# Patient Record
Sex: MALE | Race: WHITE | NOT HISPANIC OR LATINO | Employment: FULL TIME | URBAN - METROPOLITAN AREA
[De-identification: names, ages, dates, MRNs, and addresses within clinical notes are randomized per-mention and may not be internally consistent; named-entity substitution may affect disease eponyms.]

---

## 2017-05-04 ENCOUNTER — OFFICE VISIT (OUTPATIENT)
Dept: PEDIATRICS | Facility: CLINIC | Age: 47
End: 2017-05-04
Payer: COMMERCIAL

## 2017-05-04 VITALS
WEIGHT: 247 LBS | HEIGHT: 76 IN | OXYGEN SATURATION: 95 % | TEMPERATURE: 98 F | DIASTOLIC BLOOD PRESSURE: 80 MMHG | HEART RATE: 56 BPM | BODY MASS INDEX: 30.08 KG/M2 | SYSTOLIC BLOOD PRESSURE: 128 MMHG

## 2017-05-04 DIAGNOSIS — Z00.00 ENCOUNTER FOR ROUTINE ADULT HEALTH EXAMINATION WITHOUT ABNORMAL FINDINGS: ICD-10-CM

## 2017-05-04 DIAGNOSIS — M79.672 CHRONIC PAIN OF LEFT HEEL: Primary | ICD-10-CM

## 2017-05-04 DIAGNOSIS — G89.29 CHRONIC PAIN OF LEFT HEEL: Primary | ICD-10-CM

## 2017-05-04 PROCEDURE — 99386 PREV VISIT NEW AGE 40-64: CPT | Performed by: INTERNAL MEDICINE

## 2017-05-04 PROCEDURE — 99213 OFFICE O/P EST LOW 20 MIN: CPT | Mod: 25 | Performed by: INTERNAL MEDICINE

## 2017-05-04 RX ORDER — FLUTICASONE PROPIONATE 50 MCG
1 SPRAY, SUSPENSION (ML) NASAL DAILY
COMMUNITY

## 2017-05-04 NOTE — MR AVS SNAPSHOT
After Visit Summary   5/4/2017    Riley Lagos    MRN: [id number]           Patient Information     Date Of Birth          1970        Visit Information        Provider Department      5/4/2017 4:30 PM Greyson Mendez MD [facility]        Today's Diagnoses     Chronic pain of left heel    -  1    Encounter for routine adult health examination without abnormal findings          Care Instructions    1) Ordered cholesterol, liver/kidney functions, and prostate cancer screening for you, this can be done as a lab only appointment that you scheduled.    2) They should call you to set up with sports medicine in [place]    Greyson Mendez MD    Preventive Health Recommendations  Male Ages 40 to 49    Yearly exam:             See your health care provider every year in order to  o   Review health changes.   o   Discuss preventive care.    o   Review your medicines if your doctor has prescribed any.    You should be tested each year for STDs (sexually transmitted diseases) if you re at risk.     Have a cholesterol test every 5 years.     Have a colonoscopy (test for colon cancer) if someone in your family has had colon cancer or polyps before age 50.     After age 45, have a diabetes test (fasting glucose). If you are at risk for diabetes, you should have this test every 3 years.      Talk with your health care provider about whether or not a prostate cancer screening test (PSA) is right for you.    Shots: Get a flu shot each year. Get a tetanus shot every 10 years.     Nutrition:    Eat at least 5 servings of fruits and vegetables daily.     Eat whole-grain bread, whole-wheat pasta and brown rice instead of white grains and rice.     Talk to your provider about Calcium and Vitamin D.     Lifestyle    Exercise for at least 150 minutes a week (30 minutes a day, 5 days a week). This will help you control your weight and prevent disease.     Limit alcohol to one drink per day.     No  "smoking.     Wear sunscreen to prevent skin cancer.     See your dentist every six months for an exam and cleaning.        Some additional information on kidney stones  Dietary modification -- From the viewpoint of diet, increasing the intake of fluid, dietary calcium, potassium and phytate may be beneficial. In addition, decreasing the intake of oxalate, animal protein, sucrose, fructose, sodium, supplemental vitamin C, and supplemental calcium (as opposed to dietary calcium) may reduce the risk of stones [1] (see \"Risk factors for calcium stones in adults\"). The role of vitamin D intake in stone formation remains unclear.  The importance of a possible \"stone clinic\" effect following dietary recommendations should not be underestimated. One study found that 58 percent of 108 patients evaluated for kidney stones had, over a five-year period, no evidence of active stone disease after a visit to the stone clinic in which minimal advice was given regarding diet and fluid intake [2]. Those patients who did not form new stones showed an increase in urine volume at follow-up versus no change in those who continued to form stones.  Increase fluid intake -- Increasing fluid intake, spread throughout the day (although it is not essential that the patient wake up several times per night to urinate), will increase the urine flow rate and lower the urine solute concentration, both of which protect against stone formation [3]. In one prospective trial, 199 patients with a first calcium oxalate stone were randomly assigned to no therapy or recommendation of a high fluid intake to produce at least 2 liters of urine per day [4]. At five years, the incidence of new stone formation was significantly lower in the treated patients than in those in the control group (12 versus 27 percent).   Similar findings were noted in a prospective study [5]. Stone formers who remained free of stones were noted to have a greater increase in urine " "volume than those who had recurrent disease (320 mL/day versus no change). This study emphasizes that even small increases in fluid intake can reduce the risk of new stone formation.  Dietary modification -- From the viewpoint of diet, increasing the intake of fluid, dietary calcium, potassium and phytate may be beneficial. In addition, decreasing the intake of oxalate, animal protein, sucrose, fructose, sodium, supplemental vitamin C, and supplemental calcium (as opposed to dietary calcium) may reduce the risk of stones [1] (see \"Risk factors for calcium stones in adults\"). The role of vitamin D intake in stone formation remains unclear.  The importance of a possible \"stone clinic\" effect following dietary recommendations should not be underestimated. One study found that 58 percent of 108 patients evaluated for kidney stones had, over a five-year period, no evidence of active stone disease after a visit to the stone clinic in which minimal advice was given regarding diet and fluid intake [2]. Those patients who did not form new stones showed an increase in urine volume at follow-up versus no change in those who continued to form stones.  Increase fluid intake -- Increasing fluid intake, spread throughout the day (although it is not essential that the patient wake up several times per night to urinate), will increase the urine flow rate and lower the urine solute concentration, both of which protect against stone formation [3]. In one prospective trial, 199 patients with a first calcium oxalate stone were randomly assigned to no therapy or recommendation of a high fluid intake to produce at least 2 liters of urine per day [4]. At five years, the incidence of new stone formation was significantly lower in the treated patients than in those in the control group (12 versus 27 percent).   Similar findings were noted in a prospective study [5]. Stone formers who remained free of stones were noted to have a greater " increase in urine volume than those who had recurrent disease (320 mL/day versus no change). This study emphasizes that even small increases in fluid intake can reduce the risk of new stone formation.          Follow-ups after your visit        Additional Services     ORTHO  REFERRAL       [facility] is referring you to the Orthopedic  Services at [facility].       The  Representative will assist you in the coordination of your Orthopedic and Musculoskeletal Care as prescribed by your physician.    The  Representative will call you within 1 business day to help schedule your appointment, or you may contact the  Representative at:    All areas ~ [phone]     Type of Referral : Non Surgical Dr. Bingham or per patient in [place]      Timeframe requested: 3 - 5 days    Coverage of these services is subject to the terms and limitations of your health insurance plan.  Please call member services at your health plan with any benefit or coverage questions.      If X-rays, CT or MRI's have been performed, please contact the facility where they were done to arrange for , prior to your scheduled appointment.  Please bring this referral request to your appointment and present it to your specialist.                  Your next 10 appointments already scheduled     May 09, 2017  8:00 AM CDT   LAB with EA LAB   [facility] ([facility])    [address]  [address] [place] [id number] [phone]           Patient must bring picture ID.  Patient should be prepared to give a urine specimen  Please do not eat 10-12 hours before your appointment if you are coming in fasting for labs on lipids, cholesterol, or glucose (sugar).  Pregnant women should follow their Care Team instructions. Water with medications is okay. Do not drink coffee or other fluids.   If you have concerns about taking  your  "medications, please ask at office or if scheduling via ChipIn, send a message by clicking on Secure Messaging, Message Your Care Team.              Future tests that were ordered for you today     Open Future Orders        Priority Expected Expires Ordered    Comprehensive metabolic panel Routine  5/4/2018 5/4/2017    Lipid panel reflex to direct LDL Routine  5/4/2018 5/4/2017    PSA, screen Routine  5/4/2018 5/4/2017            Who to contact     If you have questions or need follow up information about today's clinic visit or your schedule please contact [facility] directly at [phone].  Normal or non-critical lab and imaging results will be communicated to you by NexJ Systemshart, letter or phone within 4 business days after the clinic has received the results. If you do not hear from us within 7 days, please contact the clinic through iPerceptionst or phone. If you have a critical or abnormal lab result, we will notify you by phone as soon as possible.  Submit refill requests through ChipIn or call your pharmacy and they will forward the refill request to us. Please allow 3 business days for your refill to be completed.          Additional Information About Your Visit        NexJ Systemshar8bit Information     ChipIn gives you secure access to your electronic health record. If you see a primary care provider, you can also send messages to your care team and make appointments. If you have questions, please call your primary care clinic.  If you do not have a primary care provider, please call [phone] and they will assist you.        Care EveryWhere ID     This is your Care EveryWhere ID. This could be used by other organizations to access your [facility] medical records  [id number]        Your Vitals Were     Pulse Temperature Height Pulse Oximetry BMI (Body Mass Index)       56 98  F (36.7  C) (Oral) 6' 4\" (1.93 m) 95% 30.07 kg/m2        Blood Pressure from Last 3 Encounters:   05/04/17 128/80   06/29/15 139/83 "   01/19/12 140/84    Weight from Last 3 Encounters:   05/04/17 247 lb (112 kg)   06/29/15 230 lb (104.3 kg)   01/19/12 249 lb (112.9 kg)              We Performed the Following     ORTHO  REFERRAL        Primary Care Provider Office Phone # Fax #    Greyson Mendez [phone]       [facility] [address] [place] [address]        Thank you!     Thank you for choosing [facility]  for your care. Our goal is always to provide you with excellent care. Hearing back from our patients is one way we can continue to improve our services. Please take a few minutes to complete the written survey that you may receive in the mail after your visit with us. Thank you!             Your Updated Medication List - Protect others around you: Learn how to safely use, store and throw away your medicines at www.disposemymeds.org.          This list is accurate as of: 5/4/17  5:16 PM.  Always use your most recent med list.                   Brand Name Dispense Instructions for use    CLARITIN 10 MG capsule   Generic drug:  loratadine      Take 10 mg by mouth daily.       FLONASE NA

## 2017-05-04 NOTE — NURSING NOTE
"Chief Complaint   Patient presents with     Physical     Establish Care       Initial /80 (BP Location: Right arm, Cuff Size: Adult Large)  Pulse 56  Temp 98  F (36.7  C) (Oral)  Ht 6' 4\" (1.93 m)  Wt 247 lb (112 kg)  SpO2 95%  BMI 30.07 kg/m2 Estimated body mass index is 30.07 kg/(m^2) as calculated from the following:    Height as of this encounter: 6' 4\" (1.93 m).    Weight as of this encounter: 247 lb (112 kg).  Medication Reconciliation: complete   Mirtha Sharma MA    "

## 2017-05-04 NOTE — PROGRESS NOTES
SUBJECTIVE:     CC: Riley Lagos is an 47 year old male who presents for preventative health visit.     Physical   Annual:     Getting at least 3 servings of Calcium per day::  Yes    Bi-annual eye exam::  NO    Dental care twice a year::  Yes    Sleep apnea or symptoms of sleep apnea::  Excessive snoring    Diet::  Regular (no restrictions)    Frequency of exercise::  4-5 days/week    Duration of exercise::  30-45 minutes    Taking medications regularly::  Yes    Medication side effects::  Not applicable    Additional concerns today::  YES    Left ankle: has been causing some issues- seen [facility] orth[facility] in the past has noted spur in the area, pain in the achilles area, did PT- did not help area. Has been exercising every morning, doing biking, worse with dress shoes. Discussed platelet rich injection.    Sleeping- typically will get up once at night for urination, FIT bit sleep showing fairly still- snoring at times. Did sleep study in the past       Today's PHQ-2 Score:   PHQ-2 ( 1999 Pfizer) 5/1/2017   Little interest or pleasure in doing things Not at all   Feeling down, depressed or hopeless Not at all   PHQ-2 Score 0       Abuse: Current or Past(Physical, Sexual or Emotional)- No  Do you feel safe in your environment - Yes    Social History   Substance Use Topics     Smoking status: Never Smoker     Smokeless tobacco: Not on file     Alcohol use 2.0 oz/week     4 Standard drinks or equivalent per week     The patient does not drink >3 drinks per day nor >7 drinks per week.    Last PSA: No results found for: PSA    Recent Labs   Lab Test  01/10/12   0901   CHOL  180   HDL  61   LDL  103   TRIG  78   CHOLHDLRATIO  3.0       Reviewed orders with patient. Reviewed health maintenance and updated orders accordingly - Yes    Reviewed and updated as needed this visit by clinical staff  Tobacco  Allergies  Meds  Med Hx  Surg Hx  Fam Hx  Soc Hx        Reviewed and updated as needed this visit by  "Provider            ROS:  C: NEGATIVE for fever, chills, change in weight  I: NEGATIVE for worrisome rashes, moles or lesions  E: NEGATIVE for vision changes or irritation  ENT: NEGATIVE for ear, mouth and throat problems  R: NEGATIVE for significant cough or SOB  CV: NEGATIVE for chest pain, palpitations or peripheral edema  GI: NEGATIVE for nausea, abdominal pain, heartburn, or change in bowel habits   male: negative for dysuria, hematuria, decreased urinary stream, erectile dysfunction, urethral discharge  M: NEGATIVE for significant arthralgias or myalgia  N: NEGATIVE for weakness, dizziness or paresthesias  P: NEGATIVE for changes in mood or affect    Problem list, Medication list, Allergies, and Medical/Social/Surgical histories reviewed in [facility] and updated as appropriate.  OBJECTIVE:     /80 (BP Location: Right arm, Cuff Size: Adult Large)  Pulse 56  Temp 98  F (36.7  C) (Oral)  Ht 6' 4\" (1.93 m)  Wt 247 lb (112 kg)  SpO2 95%  BMI 30.07 kg/m2  EXAM:  GENERAL: healthy, alert and no distress  EYES: Eyes grossly normal to inspection, PERRL and conjunctivae and sclerae normal  HENT: ear canals and TM's normal, nose and mouth without ulcers or lesions  NECK: no adenopathy, no asymmetry, masses, or scars and thyroid normal to palpation  RESP: lungs clear to auscultation - no rales, rhonchi or wheezes  CV: regular rate and rhythm, normal S1 S2, no S3 or S4, no murmur, click or rub, no peripheral edema and peripheral pulses strong  ABDOMEN: soft, nontender, no hepatosplenomegaly, no masses and bowel sounds normal  MS: tenderness along left achilles insertion  SKIN: no suspicious lesions or rashes  NEURO: Normal strength and tone, mentation intact and speech normal  BACK: no CVA tenderness, no paralumbar tenderness  PSYCH: mentation appears normal, affect normal/bright  extremities normal- no gross deformities noted  ASSESSMENT/PLAN:     1. Chronic pain of left heel  Discussed evaluation by sports " "medicine  - ORTHO  REFERRAL    2. Encounter for routine adult health examination without abnormal findings  Will do routine health screening, family history of prostate cancer, so will start screening  - Lipid panel reflex to direct LDL; Future  - PSA, screen; Future  - Comprehensive metabolic panel; Future    COUNSELING:   Reviewed preventive health counseling, as reflected in patient instructions         reports that he has never smoked. He does not have any smokeless tobacco history on file.    Estimated body mass index is 28 kg/(m^2) as calculated from the following:    Height as of 6/29/15: 6' 4\" (1.93 m).    Weight as of 6/29/15: 230 lb (104.3 kg).   Weight management plan: Discussed healthy diet and exercise guidelines and patient will follow up in 12 months in clinic to re-evaluate.    Counseling Resources:  ATP IV Guidelines  Pooled Cohorts Equation Calculator  FRAX Risk Assessment  ICSI Preventive Guidelines  Dietary Guidelines for Americans, 2010  USDA's MyPlate  ASA Prophylaxis  Lung CA Screening    Greyson Mendez MD, MD  [facility]  "

## 2017-05-04 NOTE — PATIENT INSTRUCTIONS
1) Ordered cholesterol, liver/kidney functions, and prostate cancer screening for you, this can be done as a lab only appointment that you scheduled.    2) They should call you to set up with sports medicine in [place]    Greyson Mendez MD    Preventive Health Recommendations  Male Ages 40 to 49    Yearly exam:             See your health care provider every year in order to  o   Review health changes.   o   Discuss preventive care.    o   Review your medicines if your doctor has prescribed any.    You should be tested each year for STDs (sexually transmitted diseases) if you re at risk.     Have a cholesterol test every 5 years.     Have a colonoscopy (test for colon cancer) if someone in your family has had colon cancer or polyps before age 50.     After age 45, have a diabetes test (fasting glucose). If you are at risk for diabetes, you should have this test every 3 years.      Talk with your health care provider about whether or not a prostate cancer screening test (PSA) is right for you.    Shots: Get a flu shot each year. Get a tetanus shot every 10 years.     Nutrition:    Eat at least 5 servings of fruits and vegetables daily.     Eat whole-grain bread, whole-wheat pasta and brown rice instead of white grains and rice.     Talk to your provider about Calcium and Vitamin D.     Lifestyle    Exercise for at least 150 minutes a week (30 minutes a day, 5 days a week). This will help you control your weight and prevent disease.     Limit alcohol to one drink per day.     No smoking.     Wear sunscreen to prevent skin cancer.     See your dentist every six months for an exam and cleaning.        Some additional information on kidney stones  Dietary modification   From the viewpoint of diet, increasing the intake of fluid, dietary calcium, potassium and phytate may be beneficial. In addition, decreasing the intake of oxalate, animal protein, sucrose, fructose, sodium, supplemental vitamin C, and supplemental calcium  "(as opposed to dietary calcium) may reduce the risk of stones [1] (see \"Risk factors for calcium stones in adults\"). The role of vitamin D intake in stone formation remains unclear.  The importance of a possible \"stone clinic\" effect following dietary recommendations should not be underestimated. One study found that 58 percent of 108 patients evaluated for kidney stones had, over a five-year period, no evidence of active stone disease after a visit to the stone clinic in which minimal advice was given regarding diet and fluid intake [2]. Those patients who did not form new stones showed an increase in urine volume at follow-up versus no change in those who continued to form stones.  Increase fluid intake   Increasing fluid intake, spread throughout the day (although it is not essential that the patient wake up several times per night to urinate), will increase the urine flow rate and lower the urine solute concentration, both of which protect against stone formation [3]. In one prospective trial, 199 patients with a first calcium oxalate stone were randomly assigned to no therapy or recommendation of a high fluid intake to produce at least 2 liters of urine per day [4]. At five years, the incidence of new stone formation was significantly lower in the treated patients than in those in the control group (12 versus 27 percent).   Similar findings were noted in a prospective study [5]. Stone formers who remained free of stones were noted to have a greater increase in urine volume than those who had recurrent disease (320 mL/day versus no change). This study emphasizes that even small increases in fluid intake can reduce the risk of new stone formation.  Dietary modification   From the viewpoint of diet, increasing the intake of fluid, dietary calcium, potassium and phytate may be beneficial. In addition, decreasing the intake of oxalate, animal protein, sucrose, fructose, sodium, supplemental vitamin C, and " "supplemental calcium (as opposed to dietary calcium) may reduce the risk of stones [1] (see \"Risk factors for calcium stones in adults\"). The role of vitamin D intake in stone formation remains unclear.  The importance of a possible \"stone clinic\" effect following dietary recommendations should not be underestimated. One study found that 58 percent of 108 patients evaluated for kidney stones had, over a five-year period, no evidence of active stone disease after a visit to the stone clinic in which minimal advice was given regarding diet and fluid intake [2]. Those patients who did not form new stones showed an increase in urine volume at follow-up versus no change in those who continued to form stones.  Increase fluid intake   Increasing fluid intake, spread throughout the day (although it is not essential that the patient wake up several times per night to urinate), will increase the urine flow rate and lower the urine solute concentration, both of which protect against stone formation [3]. In one prospective trial, 199 patients with a first calcium oxalate stone were randomly assigned to no therapy or recommendation of a high fluid intake to produce at least 2 liters of urine per day [4]. At five years, the incidence of new stone formation was significantly lower in the treated patients than in those in the control group (12 versus 27 percent).   Similar findings were noted in a prospective study [5]. Stone formers who remained free of stones were noted to have a greater increase in urine volume than those who had recurrent disease (320 mL/day versus no change). This study emphasizes that even small increases in fluid intake can reduce the risk of new stone formation.    "

## 2017-05-09 DIAGNOSIS — Z00.00 ENCOUNTER FOR ROUTINE ADULT HEALTH EXAMINATION WITHOUT ABNORMAL FINDINGS: ICD-10-CM

## 2017-05-09 LAB
ALBUMIN SERPL-MCNC: 4.1 G/DL (ref 3.4–5)
ALP SERPL-CCNC: 72 U/L (ref 40–150)
ALT SERPL W P-5'-P-CCNC: 31 U/L (ref 0–70)
ANION GAP SERPL CALCULATED.3IONS-SCNC: 10 MMOL/L (ref 3–14)
AST SERPL W P-5'-P-CCNC: 15 U/L (ref 0–45)
BILIRUB SERPL-MCNC: 0.9 MG/DL (ref 0.2–1.3)
BUN SERPL-MCNC: 21 MG/DL (ref 7–30)
CALCIUM SERPL-MCNC: 9.4 MG/DL (ref 8.5–10.1)
CHLORIDE SERPL-SCNC: 107 MMOL/L (ref 94–109)
CHOLEST SERPL-MCNC: 184 MG/DL
CO2 SERPL-SCNC: 23 MMOL/L (ref 20–32)
CREAT SERPL-MCNC: 1.13 MG/DL (ref 0.66–1.25)
GFR SERPL CREATININE-BSD FRML MDRD: 69 ML/MIN/1.7M2
GLUCOSE SERPL-MCNC: 107 MG/DL (ref 70–99)
HDLC SERPL-MCNC: 71 MG/DL
LDLC SERPL CALC-MCNC: 99 MG/DL
NONHDLC SERPL-MCNC: 113 MG/DL
POTASSIUM SERPL-SCNC: 4.3 MMOL/L (ref 3.4–5.3)
PROT SERPL-MCNC: 7.2 G/DL (ref 6.8–8.8)
PSA SERPL-ACNC: 0.71 UG/L (ref 0–4)
SODIUM SERPL-SCNC: 140 MMOL/L (ref 133–144)
TRIGL SERPL-MCNC: 71 MG/DL

## 2017-05-09 PROCEDURE — 80053 COMPREHEN METABOLIC PANEL: CPT | Performed by: INTERNAL MEDICINE

## 2017-05-09 PROCEDURE — G0103 PSA SCREENING: HCPCS | Performed by: INTERNAL MEDICINE

## 2017-05-09 PROCEDURE — 80061 LIPID PANEL: CPT | Performed by: INTERNAL MEDICINE

## 2017-05-09 PROCEDURE — 36415 COLL VENOUS BLD VENIPUNCTURE: CPT | Performed by: INTERNAL MEDICINE

## 2017-05-16 ENCOUNTER — OFFICE VISIT (OUTPATIENT)
Dept: ORTHOPEDICS | Facility: CLINIC | Age: 47
End: 2017-05-16
Payer: COMMERCIAL

## 2017-05-16 VITALS
WEIGHT: 240 LBS | BODY MASS INDEX: 29.22 KG/M2 | HEIGHT: 76 IN | SYSTOLIC BLOOD PRESSURE: 124 MMHG | DIASTOLIC BLOOD PRESSURE: 82 MMHG

## 2017-05-16 DIAGNOSIS — M67.88 ACHILLES TENDINOSIS OF LEFT LOWER EXTREMITY: ICD-10-CM

## 2017-05-16 DIAGNOSIS — G89.29 CHRONIC PAIN OF LEFT ANKLE: Primary | ICD-10-CM

## 2017-05-16 DIAGNOSIS — M72.2 PLANTAR FASCIITIS, LEFT: ICD-10-CM

## 2017-05-16 DIAGNOSIS — M25.572 CHRONIC PAIN OF LEFT ANKLE: Primary | ICD-10-CM

## 2017-05-16 PROCEDURE — 99203 OFFICE O/P NEW LOW 30 MIN: CPT | Performed by: FAMILY MEDICINE

## 2017-05-16 NOTE — PROGRESS NOTES
ASSESSMENT & PLAN    ICD-10-CM    1. Chronic pain of left ankle M25.572     G89.29    2. Achilles tendinosis of left lower extremity M76.62    3. Plantar fasciitis, left M72.2    Discussed exam and reviewed MRI images  Would agree with achilles tendinopathy and chronic appearing plantar fasciitis  Your exam supports more achilles pathology / source of your pain  Options include PRP v continued PT. PRP would be an out of pocket expense and can be scheduled directly with Nitish.  Would not recommend Tenex given insertional nature - increase rate of rupture/failure    Follow up as needed / desired for PRP. Call direct clinic number [[phone]] at any time with questions or concerns. Instructed to call the office if the condition evolves or worsens.    -----    SUBJECTIVE  Riley Lagos is a/an 47 year old male who is seen in consultation at the request of Dr. Mendez for evaluation of left posterior heel pain . The patient is seen by themselves.    Onset: 1 years(s) ago. Reports insidious onset without acute precipitating event.   Worsened by: walking, certain footwear  Better with: biking  Quality: sharp, intermittent  Pain Scale (maximum/current)/10: 6/10 / 4/10  Treatments tried: ice, Aleve, anti-inflammatory cream, physical therapy (6-8 visits over summer 2016) and previous imaging (xray and MRI at [facility] - MRI reports below), diagnosed with achilles tendonitis.  Orthopedic history: NO  Relevant surgical history: NO  Patient Social History: works at [employer]    Patient's past medical, surgical, social, and family histories were reviewed today and no changes are noted.    REVIEW OF SYSTEMS:  10 point ROS is negative other than symptoms noted above in HPI, Past Medical History or as stated below  Constitutional: NEGATIVE for fever, chills, change in weight  Skin: NEGATIVE for worrisome rashes, moles or lesions  GI/: NEGATIVE for bowel or bladder changes  Neuro: NEGATIVE for weakness, dizziness or  "paresthesias    OBJECTIVE:  /82  Ht 6' 4\" (1.93 m)  Wt 240 lb (108.9 kg)  BMI 29.21 kg/m2   General: healthy, alert and in no distress  HEENT: no scleral icterus or conjunctival erythema  Skin: no suspicious lesions or rash. No jaundice.  CV:  no pedal edema  Resp: normal respiratory effort without conversational dyspnea   Psych: normal mood and affect  Gait: antalgic gate  Neuro: Motor strength as noted below  MSK:  LEFT ANKLE  Inspection:    No swelling or ecchymosis is observed  Palpation:    Tender about the achilles tendon (insertional) and calcaneous. Nontender over plantar fascia origin, lateral ligaments and deltoid ligament.  Remainder of bony and ligamentous landmarks are nontender.  Range of Motion:     Near normal range of motion, lacking in gastroc stretch compared to right, equal soleous stretch b/l  Strength:    Decreased with resisted plantarflexion  Special Tests:    negative Barnes sign    Independent visualization of the below image:   EXAM: MRI OF THE LEFT ANKLE AND HINDFOOT  CLINICAL INFORMATION: The patient is a 46-year-old male with left ankle and Achilles pain. Evaluate for Achilles tendinosis.  PRIOR SURGERY: None reported.  COMPARISON STUDIES: No prior studies are available for comparison.  TECHNICAL INFORMATION: Using a 1.5T MR scanner and a localizing surface coil:  3.0 mm sagittals: PD, T2, STIR  3.0 mm coronals: PD, T2  3.0 mm axials: PD, T2  FINDINGS:  Osseous structures:  STIR imaging of the distal tibia and distal fibula shows no evidence for marrow edema or cortical injury. There is no evidence for fracture. No definite bony abnormalities of the talus can be seen there is a broad-based area of increased marrow signal intensity seen involving the central and posterior aspects of the calcaneus on sagittal series 4 image 14. The findings may relate to reactive marrow edema related to the Achilles tendinosis described below. The findings may also relate to stress changes " within the calcaneus. No well-defined calcaneal fracture is present. Note is made of dorsal and plantar calcaneal spurring with suspected fragmentation involving the dorsal calcaneal spurring noted on sagittal series 4 image 13. The anterior portions of the calcaneus appear intact and no injuries to the anterior process can be seen. No bony abnormalities of the midfoot are present. There are no abnormalities in midfoot alignment.  Os trigonum: There is a prominent posterior process of the talus, however no definite evidence for os trigonum can be seen.  Tarsal coalition: No calcaneonavicular, talocalcaneal or cubonavicular coalition is present.  Tibiotalar joint:  Effusion: Mild.  Ganglion cyst: None.  Osteochondral surfaces: No definite chondral or osteochondral injuries can be seen along the articular surfaces of the talar dome or tibial plafond.  Loose bodies: No definite intra-articular loose bodies are seen.  Subtalar joint:  Effusion: Mild.  Articular cartilage: No chondral or osteochondral abnormality identified.  Tarsal joints:  Talonavicular: Unremarkable.  Calcaneocuboid: Unremarkable.  Naviculocuneiform: Unremarkable.  Ligamentous structures:  Syndesmotic Ligaments: The lateral syndesmotic ligaments appear intact. No widening of the syndesmosis can be seen.  Lateral Ligaments: Chronic appearing thickening and irregularity of the anterior talofibular, calcaneofibular, and posterior talofibular ligaments can be seen, in keeping with a prior inversion sprain. Mild synovial proliferation can be seen on axial series 8 image 16, however no definite impinging mass within the lateral gutter is identified.  Medial Ligaments: A chronic incomplete deltoid sprain is seen on coronal series 9 image 17.  Spring Ligaments: Intact.  Sinus Tarsi: Intact cervical and interosseous ligaments.  Flexor tendons:  Posterior tibial: Normal, without tendinopathy, tenosynovitis or tear.  Flexor digitorum longus: Normal.  Flexor  hallucis longus: Normal, without convincing pathologic tenosynovitis.  Peroneus longus and brevis: Normal, without tendinopathy, tenosynovitis or tear. No injuries to the peroneal retinaculum can be seen.  Extensor tendons:  Tibialis anterior: Normal, without tendinopathy, tenosynovitis or tear.  Extensor hallucis longus: Normal.  Extensor digitorum longus: Normal.  Achilles tendon: Broad-based changes of mild to moderate mid to distal Achilles tendinosis can be seen with intrasubstance splitting of the mid and distal fibers. No well-defined transverse tearing is identified. There is mild to moderate fluid within the retrocalcaneal bursa, in keeping with changes of bursitis. Spurring and/or fragmentation involving the calcaneus can be seen at the Achilles insertion. No evidence for injury to the Achilles myotendinous junction can be seen.  Plantar aponeurosis: Chronic-appearing changes of mild to moderate plantar fasciitis can be seen at the plantar fascia origin. No well-defined tearing is seen.  Neurovascular structures: No definite neurovascular abnormalities are seen. The tarsal tunnel is within normal limits.  CONCLUSION:  1. Mild to moderate mid to distal Achilles tendinosis with mild intrasubstance splitting.  2. Marrow edema within the central and posterior aspects of the calcaneus, in keeping with reactive edema or stress change. Although there is spurring and fragmentation at the Achilles insertion, no areas of more well-defined fracture are seen.  3. Mild to moderate retrocalcaneal bursitis.  4. Chronic appearing sprain injuries of the lateral ligamentous complex and deltoid complex.  5. Mild to moderate chronic-appearing changes of plantar fasciitis at the plantar fascia origin.  AEC    Electronically signed on 6/3/2016 7:46:00 AM by Juan Carlos Rodney M.D.    Patient's conditions were thoroughly discussed during today's visit with greater than 50% of the visit spent counseling the patient with total  time spent face-to-face with the patient being 20 minutes.    Millie Bingham DO [facility]

## 2017-05-16 NOTE — PATIENT INSTRUCTIONS
Thank you for allowing us to participate in your care today.  Please find below your visit diagnosis and the plan going forward.    1. Chronic pain of left ankle    2. Achilles tendinosis of left lower extremity    3. Plantar fasciitis, left      Discussed exam and reviewed MRI images  Would agree with achilles tendinopathy and chronic appearing plantar fasciitis  Your exam supports more achilles pathology / source of your pain  Options include PRP v continued PT. PRP would be an out of pocket expense and can be schedule directly with Nitish.  Would not recommend Tenex because of tendinopathy is located close to the heel and carries an increased rate of rupter    Follow up as needed / desired for PRP. Call direct clinic number [[phone]] at any time with questions or concerns.    Millie Bingham,  [facility]  Website: [email or web address]  Twitter: [email or web address]      PRP (Platelet Rich Plasma) Pre-Procedure Instructions  1. Please stop all aspirin, aspirin products and all non steroidal anti-inflammatories (Motrin, Advil, Naprosyn, Naproxen, Aleve, Indocin, Mobic, Toradol, Voltaren, Arthrotec, Ibuprofen, Diclofenac) 7 days prior to the procedure  2. If you take fish oil or vitamin E please stop 5 days prior to the procedure  3. If you take Coumadin, Lovenox, Warfarin, Pradaxa, Plavix, Prasugrel or any other blood thinner please discuss this with Dr. Bingham. These medications will need to be stopped prior to the procedure and requires the permission of the physician prescribing them.  4. Begin to hydrate by drinking 8 glasses (64 ounces) of water in the 24 hours prior to your procedure.  5. For your safety you should arrange to have someone drive you home after the procedure.  6. Please note that PRP is not covered by insurance and therefore will be an out-of-pocket expense.    Expect to be in a walking boot from 0-3 days after the procedure.   You will have increased pain  after the procedure and will be given a narcotic pain medication to help with pain. No alcohol or driving while taking this medication. Otherwise you can use Tylenol for pain. No anti-inflammatories for 2 weeks after the PRP injection  You will see me one week after your injection and we will plan to start formal physical therapy 1-2 weeks after the injection  Will will see how your pain and function are approximately 6-8 weeks from the procedure. The decision to do another injection, if necessary, will not be determined until this time.

## 2017-05-16 NOTE — Clinical Note
Riley Edwards Dr. saw me at [place] on May 16, 2017.  Please refer to the visit note at your convenience and feel free to contact me should you have any questions.  Thank you for the consult. Sincerely,  Millie Bingham DO, [facility]

## 2017-05-16 NOTE — MR AVS SNAPSHOT
After Visit Summary   5/16/2017    Riley Lagos    MRN: [id number]           Patient Information     Date Of Birth          1970        Visit Information        Provider Department      5/16/2017 4:20 PM Millie Bingham DO [facility]        Today's Diagnoses     Chronic pain of left ankle    -  1    Achilles tendinosis of left lower extremity        Plantar fasciitis, left          Care Instructions    Thank you for allowing us to participate in your care today.  Please find below your visit diagnosis and the plan going forward.    1. Chronic pain of left ankle    2. Achilles tendinosis of left lower extremity    3. Plantar fasciitis, left      Discussed exam and reviewed MRI images  Would agree with achilles tendinopathy and chronic appearing plantar fasciitis  Your exam supports more achilles pathology / source of your pain  Options include PRP v continued PT. PRP would be an out of pocket expense and can be schedule directly with Nitish.  Would not recommend Tenex because of tendinopathy is located close to the heel and carries an increased rate of rupter    Follow up as needed / desired for PRP. Call direct clinic number [[phone]] at any time with questions or concerns.    Millie Bingham DO [facility]  Website: [email or web address]  Twitter: [email or web address]      PRP (Platelet Rich Plasma) Pre-Procedure Instructions  1. Please stop all aspirin, aspirin products and all non steroidal anti-inflammatories (Motrin, Advil, Naprosyn, Naproxen, Aleve, Indocin, Mobic, Toradol, Voltaren, Arthrotec, Ibuprofen, Diclofenac) 7 days prior to the procedure  2. If you take fish oil or vitamin E please stop 5 days prior to the procedure  3. If you take Coumadin, Lovenox, Warfarin, Pradaxa, Plavix, Prasugrel or any other blood thinner please discuss this with Dr. Bingham. These medications will need to be stopped prior to the procedure and  requires the permission of the physician prescribing them.  4. Begin to hydrate by drinking 8 glasses (64 ounces) of water in the 24 hours prior to your procedure.  5. For your safety you should arrange to have someone drive you home after the procedure.  6. Please note that PRP is not covered by insurance and therefore will be an out-of-pocket expense.    Expect to be in a walking boot from 0-3 days after the procedure.   You will have increased pain after the procedure and will be given a narcotic pain medication to help with pain. No alcohol or driving while taking this medication. Otherwise you can use Tylenol for pain. No anti-inflammatories for 2 weeks after the PRP injection  You will see me one week after your injection and we will plan to start formal physical therapy 1-2 weeks after the injection  Will will see how your pain and function are approximately 6-8 weeks from the procedure. The decision to do another injection, if necessary, will not be determined until this time.            Follow-ups after your visit        Your next 10 appointments already scheduled     Jun 20, 2017  8:45 AM CDT   New Visit with Maryellen Boyle MD   [facility] ([facility])    [address] [place] [address]  [address] [place] [id number] [phone]              Who to contact     If you have questions or need follow up information about today's clinic visit or your schedule please contact [facility] directly at [phone].  Normal or non-critical lab and imaging results will be communicated to you by MyChart, letter or phone within 4 business days after the clinic has received the results. If you do not hear from us within 7 days, please contact the clinic through MyChart or phone. If you have a critical or abnormal lab result, we will notify you by phone as soon as possible.  Submit refill requests through [employer] or call your pharmacy and they will forward the refill  "request to us. Please allow 3 business days for your refill to be completed.          Additional Information About Your Visit        MyChart Information     [employer] gives you secure access to your electronic health record. If you see a primary care provider, you can also send messages to your care team and make appointments. If you have questions, please call your primary care clinic.  If you do not have a primary care provider, please call [phone] and they will assist you.        Care EveryWhere ID     This is your Care EveryWhere ID. This could be used by other organizations to access your [place] medical records  [id number]        Your Vitals Were     Height BMI (Body Mass Index)                6' 4\" (1.93 m) 29.21 kg/m2           Blood Pressure from Last 3 Encounters:   05/16/17 124/82   05/04/17 128/80   06/29/15 139/83    Weight from Last 3 Encounters:   05/16/17 240 lb (108.9 kg)   05/04/17 247 lb (112 kg)   06/29/15 230 lb (104.3 kg)              Today, you had the following     No orders found for display       Primary Care Provider Office Phone # Fax #    Greyson Mendez [phone]       [facility] [address] [place] [address]        Thank you!     Thank you for choosing [facility]  for your care. Our goal is always to provide you with excellent care. Hearing back from our patients is one way we can continue to improve our services. Please take a few minutes to complete the written survey that you may receive in the mail after your visit with us. Thank you!             Your Updated Medication List - Protect others around you: Learn how to safely use, store and throw away your medicines at www.disposemymeds.org.          This list is accurate as of: 5/16/17  5:44 PM.  Always use your most recent med list.                   Brand Name Dispense Instructions for use    CLARITIN 10 MG capsule   Generic drug:  loratadine      Take 10 " mg by mouth daily.       FLONASE NA

## 2017-05-22 ENCOUNTER — TELEPHONE (OUTPATIENT)
Dept: ORTHOPEDICS | Facility: CLINIC | Age: 47
End: 2017-05-22

## 2017-05-22 NOTE — TELEPHONE ENCOUNTER
Patient left voicemail wanting to speak with Ntiish to schedule a PRP appointment. Please call [phone].     NANCY Alonso RN

## 2017-05-23 NOTE — TELEPHONE ENCOUNTER
He was scheduled for 5/30 @ 5:00 PM.    Email sent to Carlo SANCHEZ For supplies.    Nitish Wheatley, ATC

## 2017-05-30 ENCOUNTER — OFFICE VISIT (OUTPATIENT)
Dept: ORTHOPEDICS | Facility: CLINIC | Age: 47
End: 2017-05-30

## 2017-05-30 VITALS — RESPIRATION RATE: 13 BRPM | WEIGHT: 240 LBS | HEIGHT: 76 IN | BODY MASS INDEX: 29.22 KG/M2

## 2017-05-30 DIAGNOSIS — M67.88 ACHILLES TENDONOSIS: Primary | ICD-10-CM

## 2017-05-30 PROCEDURE — 0232T NJX PLATELET PLASMA: CPT | Performed by: FAMILY MEDICINE

## 2017-05-30 RX ORDER — HYDROCODONE BITARTRATE AND ACETAMINOPHEN 5; 325 MG/1; MG/1
1-2 TABLET ORAL EVERY 8 HOURS PRN
Qty: 18 TABLET | Refills: 0 | Status: SHIPPED | OUTPATIENT
Start: 2017-05-30 | End: 2017-06-20

## 2017-05-30 NOTE — NURSING NOTE
"Chief Complaint   Patient presents with     RECHECK       Initial Resp 13  Ht 6' 4\" (1.93 m)  Wt 240 lb (108.9 kg)  BMI 29.21 kg/m2 Estimated body mass index is 29.21 kg/(m^2) as calculated from the following:    Height as of this encounter: 6' 4\" (1.93 m).    Weight as of this encounter: 240 lb (108.9 kg).  Medication Reconciliation: complete     Nitish Wheatley, ATC    "

## 2017-05-30 NOTE — PROGRESS NOTES
Left Achilles tendon PRP Injection    Diagnoses (preoperative and postoperative): left achilles tendinitis    Current Procedure (include preoperative):  Sonographically guided  left achilles tendon PRP injection    Current Indication (include preoperative):  Alleviation of pain    Riley has elected to receive a  left achilles tendon PRP injection to help with modulation of pain and to promote healing. Sonographic guidance will be used to ensure accurate placement of the medication into the plantar fascia.    PATIENT EDUCATION:  Ready to learn with no apparent learning barriers identified. Learning preferences include listening. Explained diagnosis and treatment plan as well as treatment alternatives. Patient expressed understanding of the content.    Following denial of allergy and review of potential side effects and complications including but not necessarily limited to infection, bleeding, allergic reaction, post-injection flare, local tissue breakdown (including but not limited to potential for skin depigmentation and/or subcutaneous fat atrophy), patient indicated their understanding and agreed to proceed. Written and signed consent was obtained and is scanned into the chart.    PROCEDURE:  The patient was prepped and approximately 60cc of whole blood was withdrawn using a standard sterile technique via antecubiatal access of the arm. The whole blood was processed on location in the [employer] and approximately 5cc of Platelet Rich Plasma was obtained.        Prior to the procedure, the left achilles was examined with a 12 MHz linear transducer to visualize the fascia and determine the approach for the procedure.    Procedure was carried out using sterile technique including Chloraprep, a sterile transducer cover, and sterile transducer gel. A simple surgical tray was used.    PROCEDURAL PAUSE:  Procedural pause conducted to verify correct patient identity, procedure to be performed, and as  applicable, correct side/site, correct patient position, availability of implants, special equipment, or special requirements.    Patient position:  Sidelying    Transducer type: 12 MHz linear array transducer    Approach:  Medial to lateral in and out of plane to the transducer    Local Anesthesia: 2 cc of 1% Lidocaine was used to anesthetize the skin taking care not to introduce Lidocaine into the tendon.    Aspirate:  None    Injectate:  Sonographically guided 22-gauge 2.5-inch needle was directly visualized entering down into the achilles tendon.  After confirming needle tip position a solution with total of volume of 5 cc of PRP was injected into the tendon in both long and short axis taking care to distribute the volume throughout all areas of thickening and in area of ensthesophytes/calcific tendinopathy.    AFTERCARE:  Patient tolerated the procedure without complication. After a short observation period, patient was discharged under their own power and in excellent condition. They were placed in a CAM walking boot and specific post-procedure and rehab instructions were provided.    Follow-up in one week.    Millie Bingham DO, CAM  [facility]

## 2017-05-30 NOTE — MR AVS SNAPSHOT
After Visit Summary   5/30/2017    Riley Lagos    MRN: [id number]           Patient Information     Date Of Birth          1970        Visit Information        Provider Department      5/30/2017 5:00 PM Millie Bingham DO [facility]        Care Instructions    PRP (Platelet Rich Plasma) Post-Procedure Instructions  1. Do not take anti-inflammatories (Motrin, Advil, Naprosyn, Naproxen, Aleve, Indocin, Mobic, Toradol, Voltaren, Arthrotec, Ibuprofen, Diclofenac) for 2 weeks after the procedure  2. You can take Tylenol up to 1000 mg / dose and no more than 3,000 mg / day  3. You will be given a prescription strength pain medication (Norco) to be used for severe pain.  No driving or alcohol while taking narcotics.  4. If pain is persistent after Tylenol and Norco you may apply ice to the affected area for 20 minutes. Limit icing within the first 2 weeks.  5. Plan to rest the remainder of the day after the procedure.  7. You were given a walking boot today and plan to use for up to 7 days.  8. Do not soak your foot in a hot tub, bath or swimming pool for 48 hours.    9. A follow-up appointment should be scheduled for 1 week after the procedure.  10. Formal physical therapy, if needed, will begin 1-2 weeks after the procedure.  11. It can take up to 6-8 weeks to determine your full response to the PRP injection            Follow-ups after your visit        Your next 10 appointments already scheduled     Jun 20, 2017  8:45 AM CDT   New Visit with Maryellen Boyle MD   [facility] [place] [facility]    [address] [place] [address] [phone]              Who to contact     If you have questions or need follow up information about today's clinic visit or your schedule please contact [facility] directly at [phone].  Normal or non-critical lab and imaging results will be communicated to you by  "MyChart, letter or phone within 4 business days after the clinic has received the results. If you do not hear from us within 7 days, please contact the clinic through [employer] or phone. If you have a critical or abnormal lab result, we will notify you by phone as soon as possible.  Submit refill requests through [employer] or call your pharmacy and they will forward the refill request to us. Please allow 3 business days for your refill to be completed.          Additional Information About Your Visit        [employer] Information     [employer] gives you secure access to your electronic health record. If you see a primary care provider, you can also send messages to your care team and make appointments. If you have questions, please call your primary care clinic.  If you do not have a primary care provider, please call [phone] and they will assist you.        Care EveryWhere ID     This is your Care EveryWhere ID. This could be used by other organizations to access your [place] medical records  [id number]        Your Vitals Were     Respirations Height BMI (Body Mass Index)             13 6' 4\" (1.93 m) 29.21 kg/m2          Blood Pressure from Last 3 Encounters:   05/16/17 124/82   05/04/17 128/80   06/29/15 139/83    Weight from Last 3 Encounters:   05/30/17 240 lb (108.9 kg)   05/16/17 240 lb (108.9 kg)   05/04/17 247 lb (112 kg)              Today, you had the following     No orders found for display       Primary Care Provider Office Phone # Fax #    Greyson Mendez [phone]       [facility] [address] [place] [address]        Thank you!     Thank you for choosing [facility]  for your care. Our goal is always to provide you with excellent care. Hearing back from our patients is one way we can continue to improve our services. Please take a few minutes to complete the written survey that you may receive in the mail after your visit with us. " Thank you!             Your Updated Medication List - Protect others around you: Learn how to safely use, store and throw away your medicines at www.disposemymeds.org.          This list is accurate as of: 5/30/17  6:32 PM.  Always use your most recent med list.                   Brand Name Dispense Instructions for use    CLARITIN 10 MG capsule   Generic drug:  loratadine      Take 10 mg by mouth daily.       FLONASE NA

## 2017-05-30 NOTE — PATIENT INSTRUCTIONS
PRP (Platelet Rich Plasma) Post-Procedure Instructions  1. Do not take anti-inflammatories (Motrin, Advil, Naprosyn, Naproxen, Aleve, Indocin, Mobic, Toradol, Voltaren, Arthrotec, Ibuprofen, Diclofenac) for 2 weeks after the procedure  2. You can take Tylenol up to 1000 mg / dose and no more than 3,000 mg / day  3. You will be given a prescription strength pain medication (Norco) to be used for severe pain.  No driving or alcohol while taking narcotics.  4. If pain is persistent after Tylenol and Norco you may apply ice to the affected area for 20 minutes. Limit icing within the first 2 weeks.  5. Plan to rest the remainder of the day after the procedure.  7. You were given a walking boot today and plan to use for up to 7 days.  8. Do not soak your foot in a hot tub, bath or swimming pool for 48 hours.    9. A follow-up appointment should be scheduled for 1 week after the procedure.  10. Formal physical therapy, if needed, will begin 1-2 weeks after the procedure.  11. It can take up to 6-8 weeks to determine your full response to the PRP injection    Dispensed CAM boot today

## 2017-06-08 ENCOUNTER — OFFICE VISIT (OUTPATIENT)
Dept: ORTHOPEDICS | Facility: CLINIC | Age: 47
End: 2017-06-08
Payer: COMMERCIAL

## 2017-06-08 VITALS
HEIGHT: 76 IN | SYSTOLIC BLOOD PRESSURE: 126 MMHG | BODY MASS INDEX: 28.74 KG/M2 | WEIGHT: 236 LBS | DIASTOLIC BLOOD PRESSURE: 78 MMHG

## 2017-06-08 DIAGNOSIS — M67.88 ACHILLES TENDONOSIS: Primary | ICD-10-CM

## 2017-06-08 PROCEDURE — 99213 OFFICE O/P EST LOW 20 MIN: CPT | Performed by: FAMILY MEDICINE

## 2017-06-08 NOTE — PROGRESS NOTES
"ASSESSMENT & PLAN    ICD-10-CM    1. Achilles tendonosis M76.60 [facility] PT, HAND, AND CHIROPRACTIC REFERRAL   Currently 10 days s/p PRP injection  Appropriate post procedure pain and likely added irritation from dress shoes  Recommend continued use of boot with slowly wean. Wear over weakend with prolonged walking/uneven terrain. Otherwise, out of boot  Referral to physical therapy    Follow up over [facility]. Call direct clinic number [[phone]] at any time with questions or concerns.Instructed to call the office if the condition evolves or worsens.    -----    SUBJECTIVE:  Riley Lagos is a 47 year old male who is seen in follow-up for left achilles tendonosis and is 10 days s/p PRP injection.     They indicate that their pain level is 3/10. He was in a CAM boot after the injection until 6/6/17. He reports that he was doing very well in the CAM Boot. He reports little to no pain at all. He has been without the boot since 6/7/17 and can feel some pain and tightness. He has been wearing dress shoes.  Does a lot of walking for work.    Patient's past medical, surgical, social, and family histories were reviewed today and no changes are noted.    REVIEW OF SYSTEMS:  Constitutional: NEGATIVE for fever, chills, change in weight  Skin: NEGATIVE for worrisome rashes, moles or lesions  GI/: NEGATIVE for bowel or bladder changes  Neuro: NEGATIVE for weakness, dizziness or paresthesias    OBJECTIVE:  /78  Ht 6' 4\" (1.93 m)  Wt 236 lb (107 kg)  BMI 28.73 kg/m2   General: healthy, alert and in no distress  HEENT: no scleral icterus or conjunctival erythema  Skin: no suspicious lesions or rash. No jaundice.  CV: regular rhythm by palpation, no pedal edema  Resp: normal respiratory effort without conversational dyspnea   Psych: normal mood and affect  Gait: normal steady gait with appropriate coordination and balance  Neuro: Motor strength as noted below  MSK:  LEFT ANKLE  Inspection:    No swelling   Palpation:    " Tender about the achilles tendon (insertional) and calcaneous (less than previous).   Special Tests:    negative Barnes sign    Independent visualization of the below image:  None indicated at this time    Patient's conditions were thoroughly discussed during today's visit with greater than 50% of the visit spent counseling the patient with total time spent face-to-face with the patient being 20 minutes.    Millie Bingham,  [facility]

## 2017-06-08 NOTE — MR AVS SNAPSHOT
After Visit Summary   6/8/2017    Riley Lagos    MRN: [id number]           Patient Information     Date Of Birth          1970        Visit Information        Provider Department      6/8/2017 4:00 PM Millie Bingham DO [facility]        Today's Diagnoses     Achilles tendonosis    -  1      Care Instructions    Thank you for allowing us to participate in your care today.  Please find below your visit diagnosis and the plan going forward.    1. Achilles tendonosis      Recommend gradually weaning out of CAM boot  Recommend wearing over the weekend with high activity time. Out of boot at home.  Physical therapy: [facility] - [phone]    [facility] Call direct clinic number [[phone]] at any time with questions or concerns.    Millie Bingham DO [facility]  Website: [email or web address]  Twitter: [email or web address]            Follow-ups after your visit        Additional Services     [facility] PT, HAND, AND CHIROPRACTIC REFERRAL       **This order will print in the [facility] Scheduling Office**    Physical Therapy, Hand Therapy and Chiropractic Care are available through:    *[facility]  *[facility]  *[facility]    Call one number to schedule at any of the above locations: [phone].    Your provider has referred you to: Physical Therapy at [facility] or [facility]    Indication/Reason for Referral: Achilles pain s/p PRP injection on 5/30/17  Onset of Illness: see chart  Therapy Orders: Evaluate and Treat  Special Programs: None  Special Request: Genevieve or Christopher MAGUIRE please    Vikram Sue      Additional Comments for the Therapist or Chiropractor: PRP (Platelet Rich Plasma) Post-procedure Rehab    > 14 days  -rehab visits once/week  -Formal PT   1. Stretching for the affected muscle-tendon unit once / day, 3-4 reps, holding 20-30 seconds.   2. Joint  mobilization to restore normal joint mechanics.   3. Strengthening with emphasis on isometric and eccentric activities initially and with concentric progression as symptoms allow: 3-4 sets of 6-12 reps at moderate intensity    theraband drills for rotator cuff    dumbbell exercises for wrist /elbow    single leg press for the knee    heel raises for achilles/plantar fascia   4. Balance and proprioception activities    Joint reposition drills for upper extremity    Single leg stand and balance board drills for lower extremity.   5. Core strengthening   6. Non-impact activities with progressive resistance, duration and intensity   upper body ergometer, elliptical , stationary bike, deep water running.  -Progression criteria   1. Full range of motion   2. No pain with activities of daily living   3. Pain free 5/5 muscle testing   4. Symmetric proprioception of the affected limb   5. No high velocity / intensity exercise, plyometrics or heavy lifting    no sooner than 6-8 weeks after procedure and when they have met progression criteria from previous stage  rehab visits once every 2 weeks  Formal PT   1. Continued strengthening w/ increases in resistance, repetition and/or frequency    upper ext: progressive training in provocative positions and work/sport specific positions - including eccentric, endurance, velocity specific exercises    lower ext: impact control exercises w/ progression from single plane to multi-planar landing and agility drills w/ progressive increase in velocity and amplitude   2. Sport/work specific balance and proprioceptive drills   3. Continued core strengthening   4. Return to sport programs (throwing, running, etc) w/ symptom / criteria based progressions  Progression criteria   1. Return to sport/work criteria   2. Good dynamic neuromuscular control w/ multi-plane activities w/o pain      Please be aware that coverage of these services is subject to the terms and limitations of your  health insurance plan.  Call member services at your health plan with any benefit or coverage questions.      Please bring the following to your appointment:    *Your personal calendar for scheduling future appointments  *Comfortable clothing                  Your next 10 appointments already scheduled     Jun 19, 2017  4:20 PM CDT   [facility] Extremity with Christopehr Alvarado, PT   [facility] ([facility] [place]  )    [address]  [address]  [facility] [address]   [phone]            Jun 20, 2017  8:45 AM CDT   New Visit with Maryellen Boyle MD   [facility] ([facility])    [address] [place] [address]  [address]  [facility] [address]   [phone]            Jun 26, 2017  4:20 PM CDT   STEVE Extremity with Christopher Alvarado PT   [facility] ([facility] [place]  )    [address]  [address]  [facility] [address]   [phone]            Jul 07, 2017  3:20 PM CDT   STEVE Extremity with Paul Breyen, PT   [facility] ([facility] [place]  )    [address] [address]   [phone]              Who to contact     If you have questions or need follow up information about today's clinic visit or your schedule please contact [facility] directly at [phone].  Normal or non-critical lab and imaging results will be communicated to you by [employer], letter or phone within 4 business days after the clinic has received the results. If you do not hear from us within 7 days, please contact the clinic through [employer] or phone. If you have a critical or abnormal lab result, we will notify you by phone as soon as possible.  Submit refill requests through [employer] or call your pharmacy and they will forward the refill request to us. Please allow 3 business days for your refill to be completed.          Additional Information About Your Visit        [employer] Information     [employer] gives you secure access to your electronic health record.  "If you see a primary care provider, you can also send messages to your care team and make appointments. If you have questions, please call your primary care clinic.  If you do not have a primary care provider, please call [phone] and they will assist you.        Care EveryWhere ID     This is your Care EveryWhere ID. This could be used by other organizations to access your [place] medical records  [id number]        Your Vitals Were     Height BMI (Body Mass Index)                6' 4\" (1.93 m) 28.73 kg/m2           Blood Pressure from Last 3 Encounters:   06/08/17 126/78   05/16/17 124/82   05/04/17 128/80    Weight from Last 3 Encounters:   06/08/17 236 lb (107 kg)   05/30/17 240 lb (108.9 kg)   05/16/17 240 lb (108.9 kg)              We Performed the Following     [facility] PT, HAND, AND CHIROPRACTIC REFERRAL        Primary Care Provider Office Phone # Fax #    Greyson Mendez [phone]       [facility] [address] [place] [address]        Thank you!     Thank you for choosing [facility]  for your care. Our goal is always to provide you with excellent care. Hearing back from our patients is one way we can continue to improve our services. Please take a few minutes to complete the written survey that you may receive in the mail after your visit with us. Thank you!             Your Updated Medication List - Protect others around you: Learn how to safely use, store and throw away your medicines at www.disposemymeds.org.          This list is accurate as of: 6/8/17 11:59 PM.  Always use your most recent med list.                   Brand Name Dispense Instructions for use    CLARITIN 10 MG capsule   Generic drug:  loratadine      Take 10 mg by mouth daily.       FLONASE NA          HYDROcodone-acetaminophen 5-325 MG per tablet    NORCO    18 tablet    Take 1-2 tablets by mouth every 8 hours as needed for moderate to severe pain       order for DME "     1 each    The following items were dispensed: CAM Boot - Tall large LEFT

## 2017-06-08 NOTE — PATIENT INSTRUCTIONS
Thank you for allowing us to participate in your care today.  Please find below your visit diagnosis and the plan going forward.    1. Achilles tendonosis      Recommend gradually weaning out of CAM boot  Recommend wearing over the weekend with high activity time. Out of boot at home.  Physical therapy: [facility] - [phone]    Follow up over [email or web address]. Call direct clinic number [[phone]] at any time with questions or concerns.    Millie Bingham DO CAQSM  [facility]  Website: [email or web address]  Twitter: [email or web address]

## 2017-06-08 NOTE — NURSING NOTE
"Chief Complaint   Patient presents with     RECHECK       Initial /78  Ht 6' 4\" (1.93 m)  Wt 236 lb (107 kg)  BMI 28.73 kg/m2 Estimated body mass index is 28.73 kg/(m^2) as calculated from the following:    Height as of this encounter: 6' 4\" (1.93 m).    Weight as of this encounter: 236 lb (107 kg).  Medication Reconciliation: complete     Nitish Wheatley ATC    "

## 2017-06-19 ENCOUNTER — THERAPY VISIT (OUTPATIENT)
Dept: PHYSICAL THERAPY | Facility: CLINIC | Age: 47
End: 2017-06-19
Payer: COMMERCIAL

## 2017-06-19 DIAGNOSIS — M76.60 ACHILLES TENDONITIS: Primary | ICD-10-CM

## 2017-06-19 PROCEDURE — 97161 PT EVAL LOW COMPLEX 20 MIN: CPT | Mod: GP | Performed by: PHYSICAL THERAPIST

## 2017-06-19 PROCEDURE — 97110 THERAPEUTIC EXERCISES: CPT | Mod: GP | Performed by: PHYSICAL THERAPIST

## 2017-06-19 NOTE — MR AVS SNAPSHOT
After Visit Summary   6/19/2017    Riley Lagos    MRN: [id number]           Patient Information     Date Of Birth          1970        Visit Information        Provider Department      6/19/2017 4:20 PM Christopher Alvarado, PT [facility]        Today's Diagnoses     Achilles tendonitis    -  1       Follow-ups after your visit        Your next 10 appointments already scheduled     Jun 20, 2017  8:45 AM CDT   New Visit with Maryellen Boyle MD   [facility] ([facility])    [address]  [address]  [facility] [address]   [phone]            Jun 26, 2017  4:20 PM CDT   STEV[facility] Extremity with Christopher Alvarado PT   [facility] ([facility] [place]  )    [address]  [address]  [facility] [address]   [phone]            Jul 07, 2017  3:20 PM CDT   STEV[facility] Extremity with Paul Breyen, PT   [facility] ([facility] [place]  )    [address]  [address]  [place] [address]   [phone]              Who to contact     If you have questions or need follow up information about today's clinic visit or your schedule please contact [facility] directly at [phone].  Normal or non-critical lab and imaging results will be communicated to you by [employer], letter or phone within 4 business days after the clinic has received the results. If you do not hear from us within 7 days, please contact the clinic through [employer] or phone. If you have a critical or abnormal lab result, we will notify you by phone as soon as possible.  Submit refill requests through [employer] or call your pharmacy and they will forward the refill request to us. Please allow 3 business days for your refill to be completed.          Additional Information About Your Visit        [employer] Information     [employer] gives you secure access to your electronic health record. If you see a primary care provider, you can also send messages to your care team and make  appointments. If you have questions, please call your primary care clinic.  If you do not have a primary care provider, please call [phone] and they will assist you.        Care EveryWhere ID     This is your Care EveryWhere ID. This could be used by other organizations to access your [place] medical records  [id number]         Blood Pressure from Last 3 Encounters:   06/08/17 126/78   05/16/17 124/82   05/04/17 128/80    Weight from Last 3 Encounters:   06/08/17 107 kg (236 lb)   05/30/17 108.9 kg (240 lb)   05/16/17 108.9 kg (240 lb)              We Performed the Following     [facility] Inital Eval Report     PT Eval, Low Complexity ([id number])     Therapeutic Exercises        Primary Care Provider Office Phone # Fax #    Greyson Mendez [phone]       [facility] [address] [place] [address]        Thank you!     Thank you for choosing [facility]  for your care. Our goal is always to provide you with excellent care. Hearing back from our patients is one way we can continue to improve our services. Please take a few minutes to complete the written survey that you may receive in the mail after your visit with us. Thank you!             Your Updated Medication List - Protect others around you: Learn how to safely use, store and throw away your medicines at www.disposemymeds.org.          This list is accurate as of: 6/19/17  5:45 PM.  Always use your most recent med list.                   Brand Name Dispense Instructions for use    CLARITIN 10 MG capsule   Generic drug:  loratadine      Take 10 mg by mouth daily.       FLONASE NA          HYDROcodone-acetaminophen 5-325 MG per tablet    NORCO    18 tablet    Take 1-2 tablets by mouth every 8 hours as needed for moderate to severe pain       order for DME     1 each    The following items were dispensed: CAM Boot - Tall large LEFT

## 2017-06-19 NOTE — PROGRESS NOTES
Subjective:    Patient is a 47 year old male presenting with rehab left ankle/foot hpi.   Riley Lagos is a 47 year old male with a left ankle condition.  Condition occurred with:  Insidious onset.  Condition occurred: for unknown reasons.  This is a chronic condition  Onset of left posterior ankle pain 1 year ago of unknown etiology. Physical therapy Summer 2016 did not decrease symptoms. 5-30-17 PRP injection which helped decrease the pain significantly. Pt referred by MD for physical therapy on 6-8-17.    Patient reports pain:  Posterior.    Pain is described as aching and is intermittent and reported as 2/10 and 7/10.  Associated symptoms:  Loss of motion/stiffness and loss of strength. Pain is worse in the A.M..  Symptoms are exacerbated by descending stairs, standing, walking and bending/squatting and relieved by ice and rest.  Since onset symptoms are rapidly improving (since injection).  Special tests:  X-ray (pt reports X-ray shows bone spur).  Previous treatment includes physical therapy (Summer 2016).    General health as reported by patient is good.  Pertinent medical history includes:  History of fractures.  Medical allergies: no.  Other surgeries include:  Orthopedic surgery (left knee arthroscopy).  Medication history: claritin.  Current occupation is .  Patient is working in normal job without restrictions.          Red flags:  None as reported by patient.                        Objective:    System    Ankle/Foot Evaluation  ROM:    AROM:    Dorsiflexion: Left:   12  Right:    Plantarflexion: Left:  40    Right:           PROM:    Dorsiflexion: Left:    16     Right:     Plantarflexion: Left:    46     Right:               Strength:    Dorsiflexion:  Left: 4/5     Pain:     Plantarflexion: Left: 4/5   Pain:     Inversion:Left: 5/5  Pain:       Eversion:Left: 4+/5  Pain:                Strength wnl ankle: weakness pelvic stabilizers.  LIGAMENT TESTING:  normal                PALPATION:   Left ankle tenderness present at:  achilles tendon      MOBILITY TESTING: normal                                                                General     ROS    Assessment/Plan:      Patient is a 47 year old male with left side ankle complaints.    Patient has the following significant findings with corresponding treatment plan.                Diagnosis 1:  Achilles tendon  Pain -  hot/cold therapy, manual therapy, self management, education and home program  Decreased ROM/flexibility - manual therapy, therapeutic exercise, therapeutic activity and home program  Decreased strength - therapeutic exercise, therapeutic activities and home program    Therapy Evaluation Codes:   1) History comprised of:   Personal factors that impact the plan of care:      Time since onset of symptoms.    Comorbidity factors that impact the plan of care are:      history of fractures.     Medications impacting care: allergy medications.  2) Examination of Body Systems comprised of:   Body structures and functions that impact the plan of care:      Ankle, Hip and Knee.   Activity limitations that impact the plan of care are:      Jumping, Running, Sports, Squatting/kneeling, Stairs and Walking.  3) Clinical presentation characteristics are:   Stable/Uncomplicated.  4) Decision-Making    Low complexity using standardized patient assessment instrument and/or measureable assessment of functional outcome.  Cumulative Therapy Evaluation is: Low complexity.    Previous and current functional limitations:  (See Goal Flow Sheet for this information)    Short term and Long term goals: (See Goal Flow Sheet for this information)     Communication ability:  Patient appears to be able to clearly communicate and understand verbal and written communication and follow directions correctly.  Treatment Explanation - The following has been discussed with the patient:   RX ordered/plan of care  Anticipated  outcomes  Possible risks and side effects  This patient would benefit from PT intervention to resume normal activities.   Rehab potential is good.    Frequency:  1 X week, once daily  Duration:  for 6 weeks  Discharge Plan:  Achieve all LTG.  Independent in home treatment program.  Reach maximal therapeutic benefit.    Please refer to the daily flowsheet for treatment today, total treatment time and time spent performing 1:1 timed codes.

## 2017-06-20 ENCOUNTER — OFFICE VISIT (OUTPATIENT)
Dept: DERMATOLOGY | Facility: CLINIC | Age: 47
End: 2017-06-20
Payer: COMMERCIAL

## 2017-06-20 DIAGNOSIS — D18.01 CHERRY ANGIOMA: ICD-10-CM

## 2017-06-20 DIAGNOSIS — D48.5 NEOPLASM OF UNCERTAIN BEHAVIOR OF SKIN: Primary | ICD-10-CM

## 2017-06-20 DIAGNOSIS — D22.9 MULTIPLE NEVI: ICD-10-CM

## 2017-06-20 PROCEDURE — 99203 OFFICE O/P NEW LOW 30 MIN: CPT | Mod: 25 | Performed by: DERMATOLOGY

## 2017-06-20 PROCEDURE — 11100 HC BIOPSY SKIN/SUBQ/MUC MEM, SINGLE LESION: CPT | Performed by: DERMATOLOGY

## 2017-06-20 PROCEDURE — 88305 TISSUE EXAM BY PATHOLOGIST: CPT | Mod: 90 | Performed by: DERMATOLOGY

## 2017-06-20 NOTE — MR AVS SNAPSHOT
After Visit Summary   6/20/2017    Riley Lagos    MRN: [id number]           Patient Information     Date Of Birth          1970        Visit Information        Provider Department      6/20/2017 8:45 AM Maryellen Boyle MD [facility] Instructions                    Pediatric Dermatology  [facility]  [address]  1st [place]  [address]  Phone: [phone]    Pediatric & Adult Dermatology  [facility]  [address]   2nd [facility] [address]  [phone]                  General information: Dr. Maryellen Boyle is a board-certified dermatologist with subspecialty certification in pediatric dermatology.     Scheduling and Nurse Triage: Dr. Boyle sees pediatric patients on Mondays in [place] and adult and pediatric patients on Tuesdays in [place]. The remainder of the week she practices at the [facility]. Please call the above phone numbers to schedule or to talk to a nurse.     -For scheduling at the [place] or [place] locations, or to talk to the triage nurse please call the above phone number at the clinic where you were seen.     -For medication refills, please call your pharmacy.           WOUND CARE: Wound Care After a Biopsy    How should I care for my wound for the first 24 hours?    If it bleeds, hold direct pressure on the area for 10 minutes    Acetaminophen (TylenolTM) as needed for pain    How should I care for the wound after 24 hours?    Remove the bandage     You may bathe or shower as normal    How do I clean my wound?    Use white petroleum/Vaseline  to keep sutures moist twice daily.     Replace the Bandaid /bandage to keep the wound covered until it is completely healed (not needed in the scalp)    What should I use to clean my wound?     White petroleum jelly (Vaseline )     Bandaids   as  needed    How should I care for my wound after a shave biopsy?    Keep up with wound care for one week or until the area is healed     How should I care for my wound after a punch biopsy?    Complete wound care until the stitches are removed     Stitches need to be removed in 14 days. You may return to our clinic for this or you may have it done locally at your doctor s office.    When should I call my doctor?    If you have increased:   o Pain or swelling    o Pus or drainage  o Temperature over 101? F (38.3 ? C)   Redness or warmth  around wound            Follow-ups after your visit        Your next 10 appointments already scheduled     Jun 26, 2017  4:20 PM CDT   STEVE Extremity with Genevieve Smith, PTA   [facility] ([facility]  )    [address] [address]  [address] [place] [address]   [phone]            Jul 07, 2017  3:20 PM CDT   STEVE Extremity with Paul Breyen, PT   [facility] ([facility]  )    [address] [facility] [address]  [address] [place] [address]   [phone]              Who to contact     If you have questions or need follow up information about today's clinic visit or your schedule please contact [facility] directly at [phone].  Normal or non-critical lab and imaging results will be communicated to you by [employer], letter or phone within 4 business days after the clinic has received the results. If you do not hear from us within 7 days, please contact the clinic through [employer] or phone. If you have a critical or abnormal lab result, we will notify you by phone as soon as possible.  Submit refill requests through [facility] or call your pharmacy and they will forward the refill request to us. Please allow 3 business days for your refill to be completed.          Additional Information About Your Visit        [facility] Information     [facility] gives you secure access to your electronic health record. If you see a primary care provider, you can also send messages to  your care team and make appointments. If you have questions, please call your primary care clinic.  If you do not have a primary care provider, please call [phone] and they will assist you.        Care EveryWhere ID     This is your Care EveryWhere ID. This could be used by other organizations to access your [facility] medical records  [id number]         Blood Pressure from Last 3 Encounters:   06/08/17 126/78   05/16/17 124/82   05/04/17 128/80    Weight from Last 3 Encounters:   06/08/17 236 lb (107 kg)   05/30/17 240 lb (108.9 kg)   05/16/17 240 lb (108.9 kg)              Today, you had the following     No orders found for display       Primary Care Provider Office Phone # Fax #    Greyson Mendez [phone]       [facility] [address] [place] [address]        Thank you!     Thank you for choosing [facility]  for your care. Our goal is always to provide you with excellent care. Hearing back from our patients is one way we can continue to improve our services. Please take a few minutes to complete the written survey that you may receive in the mail after your visit with us. Thank you!             Your Updated Medication List - Protect others around you: Learn how to safely use, store and throw away your medicines at www.disposemymeds.org.          This list is accurate as of: 6/20/17  9:13 AM.  Always use your most recent med list.                   Brand Name Dispense Instructions for use    CLARITIN 10 MG capsule   Generic drug:  loratadine      Take 10 mg by mouth daily.       FLONASE NA

## 2017-06-20 NOTE — LETTER
6/20/2017      RE: Riley Lagos  [address] [address]           Patient presents with:  Consult: spot on right forearm started a year ago-mosquito bite but has reduced since scheduling visit and few spots on left shoulder over 10 years that are red and raised, tx;none currently          DERMATOLOGY CLINIC VISIT NOTE      CHIEF COMPLAINT:  Bump on arm.      DERMATOLOGY PROBLEM LIST:   1.  Cherry angiomas.   2.  Multiple pigmented nevi.   3.  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      HISTORY OF PRESENT ILLNESS:  Mr. Lagos is a 47-year-old male seen in Dermatology Clinic today at the request of Dr. Greyson Mendez for initial evaluation of a bump on his right forearm.  He states that approximately 2 years ago he feels that a mosquito bit him at this location.  It became red and inflamed since that point in time.  Since scheduling the appointment, however, the area has flattened and nearly resolved.  It does not bleed, does not hurt and is not currently pruritic.  He denies use of any topical agents to the site.  He had no past treatment at the area.      In addition, he has pink marks on his skin that he would like evaluated today.  He has no personal history of skin cancer or dysplastic nevi.  He has no other skin concerns today.      Patient Active Problem List   Diagnosis     Achilles tendonitis       Allergies   Allergen Reactions     Cats      Red itchy eyes      Dogs      Red itchy eyes      Dust Mites      Runny nose and dizzy         Current Outpatient Prescriptions   Medication     Fluticasone Propionate (FLONASE NA)     Loratadine (CLARITIN) 10 MG capsule     No current facility-administered medications for this visit.      FAMILY HISTORY:  Family History   Problem Relation Age of Onset     Prostate Cancer Father      age 65     DIABETES Mother      Heart Failure Paternal Grandmother      Colon Cancer No family hx of         REVIEW OF SYSTEMS:  Feeling well  without additional skin concerns.      SOCIAL HISTORY:  The patient is .  He lives in [place].  He works for [employer].      PHYSICAL EXAMINATION:   There were no vitals taken for this visit.    GENERAL:  Mr. Lagso is a healthy-appearing 47-year-old male in no distress.   HEENT:  Conjunctivae are clear.   PULMONARY:  Breathing comfortably on room air.   ABDOMEN:  No abdominal distention line.   CARDIOVASCULAR:  Extremities warm, well perfused.   SKIN:  Examination today included the face, neck, chest, abdomen, back, arms and hands.  Skin exam was normal except for as follows:   -- Examination of the face shows scattered yellow papules at the infraorbital rim bilaterally.   -- The shoulders, upper chest, abdomen and back with scattered 2-3 mm cherry red vascular papules.   -- Scattered medium brown evenly pigmented 2-4 mm macules on upper back, chest and arms.   -- Right axillary vault with a dark brown to black slightly raised 6 mm papule with an eccentric fleshy black-brown papule.   -- Right lateral forearm with a 4 mm pink patch without overlying scale or epidermal change without nodularity or tenderness.      PROCEDURE NOTE:  The patient was consented to a shave biopsy of lesion on the right axillary vault.  Area was infiltrated with 1 mL of lidocaine with epinephrine and a DermaBlade was used to perform shave biopsy of lesion.  Aluminum chloride, petrolatum and a bandage applied and wound care instruction provided.      ASSESSMENT AND PLAN:   1.  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  Biopsy performed today and as this nevus was not typical of patient's other moles.  Wound info provided.     2.  Multiple pigmented nevi; no other lesions of concern noted today.  Sun protection advised.     3.  Cherry angiomas; benign familial vascular papules.  No treatment suggested.     4.  History of  inflammatory papule on right forearm; no lesion present today.  There is postinflammatory hyperpigmentation suggesting where the lesion had previously been located.  I advised Mr. Lagos to photograph the spot should a papule reform and return to clinic for evaluation at that time.     5.  Xanthelasma of the lower eyelid area; treatment options could include trichloroacetic acid chemical peel versus electrodessication.  I discussed that both of these would be considered cosmetic treatments and unlikely to be covered by insurance.  The patient declines treatment today.      The patient to return pending biopsy result.  Thank you for this consultation.     Maryellen Boyle MD  Dermatology Staff       cc:   Greyson Mendez MD   [facility]   [address] [address]                         Maryellen Boyle MD

## 2017-06-20 NOTE — PATIENT INSTRUCTIONS
Pediatric Dermatology  [facility]  [address]  1st [place]  [address]  Phone: [phone]    Pediatric & Adult Dermatology  [facility]  [address]   2nd Floor  [facility] [address]  [phone]                  General information: Dr. Maryellen Boyle is a board-certified dermatologist with subspecialty certification in pediatric dermatology.     Scheduling and Nurse Triage: Dr. Boyle sees pediatric patients on Mondays in [place] and adult and pediatric patients on Tuesdays in [place]. The remainder of the week she practices at the [facility]. Please call the above phone numbers to schedule or to talk to a nurse.     -For scheduling at the [place] or [place] locations, or to talk to the triage nurse please call the above phone number at the clinic where you were seen.     -For medication refills, please call your pharmacy.           WOUND CARE: Wound Care After a Biopsy    How should I care for my wound for the first 24 hours?    If it bleeds, hold direct pressure on the area for 10 minutes    Acetaminophen (TylenolTM) as needed for pain    How should I care for the wound after 24 hours?    Remove the bandage     You may bathe or shower as normal    How do I clean my wound?    Use white petroleum/Vaseline  to keep sutures moist twice daily.     Replace the Bandaid /bandage to keep the wound covered until it is completely healed (not needed in the scalp)    What should I use to clean my wound?     White petroleum jelly (Vaseline )     Bandaids   as needed    How should I care for my wound after a shave biopsy?    Keep up with wound care for one week or until the area is healed     How should I care for my wound after a punch biopsy?    Complete wound care until the stitches are removed     Stitches need to be removed in 14 days. You may return to our  clinic for this or you may have it done locally at your doctor s office.    When should I call my doctor?    If you have increased:   o Pain or swelling    o Pus or drainage  o Temperature over 101? F (38.3 ? C)   Redness or warmth  around wound

## 2017-06-20 NOTE — PROGRESS NOTES
Patient presents with:  Consult: spot on right forearm started a year ago-mosquito bite but has reduced since scheduling visit and few spots on left shoulder over 10 years that are red and raised, tx;none currently

## 2017-06-20 NOTE — NURSING NOTE
"Chief Complaint   Patient presents with     Consult     spot on right forearm started a year ago-mosquito bite but has reduced since scheduling visit and few spots on left shoulder over 10 years that are red and raised, tx;none currently         Initial There were no vitals taken for this visit. Estimated body mass index is 28.73 kg/(m^2) as calculated from the following:    Height as of 6/8/17: 6' 4\" (193 cm).    Weight as of 6/8/17: 236 lb (107 kg).  Medication Reconciliation: complete   Gemma Gilliland MA      "

## 2017-06-20 NOTE — LETTER
6/20/2017      RE: Riley Lagos  [address] [address]           Patient presents with:  Consult: spot on right forearm started a year ago-mosquito bite but has reduced since scheduling visit and few spots on left shoulder over 10 years that are red and raised, tx;none currently          DERMATOLOGY CLINIC VISIT NOTE      CHIEF COMPLAINT:  Bump on arm.      DERMATOLOGY PROBLEM LIST:   1.  Cherry angiomas.   2.  Multiple pigmented nevi.   3.  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      HISTORY OF PRESENT ILLNESS:  Mr. Lagos is a 47-year-old male seen in Dermatology Clinic today at the request of Dr. Greyson Mendez for initial evaluation of a bump on his right forearm.  He states that approximately 2 years ago he feels that a mosquito bit him at this location.  It became red and inflamed since that point in time.  Since scheduling the appointment, however, the area has flattened and nearly resolved.  It does not bleed, does not hurt and is not currently pruritic.  He denies use of any topical agents to the site.  He had no past treatment at the area.      In addition, he has pink marks on his skin that he would like evaluated today.  He has no personal history of skin cancer or dysplastic nevi.  He has no other skin concerns today.      Patient Active Problem List   Diagnosis     Achilles tendonitis       Allergies   Allergen Reactions     Cats      Red itchy eyes      Dogs      Red itchy eyes      Dust Mites      Runny nose and dizzy         Current Outpatient Prescriptions   Medication     Fluticasone Propionate (FLONASE NA)     Loratadine (CLARITIN) 10 MG capsule     No current facility-administered medications for this visit.      FAMILY HISTORY:  Family History   Problem Relation Age of Onset     Prostate Cancer Father      age 65     DIABETES Mother      Heart Failure Paternal Grandmother      Colon Cancer No family hx of         REVIEW OF SYSTEMS:  Feeling well  without additional skin concerns.      SOCIAL HISTORY:  The patient is .  He lives in [place].  He works for [employer].      PHYSICAL EXAMINATION:   There were no vitals taken for this visit.    GENERAL:  Mr. Lagos is a healthy-appearing 47-year-old male in no distress.   HEENT:  Conjunctivae are clear.   PULMONARY:  Breathing comfortably on room air.   ABDOMEN:  No abdominal distention line.   CARDIOVASCULAR:  Extremities warm, well perfused.   SKIN:  Examination today included the face, neck, chest, abdomen, back, arms and hands.  Skin exam was normal except for as follows:   -- Examination of the face shows scattered yellow papules at the infraorbital rim bilaterally.   -- The shoulders, upper chest, abdomen and back with scattered 2-3 mm cherry red vascular papules.   -- Scattered medium brown evenly pigmented 2-4 mm macules on upper back, chest and arms.   -- Right axillary vault with a dark brown to black slightly raised 6 mm papule with an eccentric fleshy black-brown papule.   -- Right lateral forearm with a 4 mm pink patch without overlying scale or epidermal change without nodularity or tenderness.      PROCEDURE NOTE:  The patient was consented to a shave biopsy of lesion on the right axillary vault.  Area was infiltrated with 1 mL of lidocaine with epinephrine and a DermaBlade was used to perform shave biopsy of lesion.  Aluminum chloride, petrolatum and a bandage applied and wound care instruction provided.      ASSESSMENT AND PLAN:   1.  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  Biopsy performed today and as this nevus was not typical of patient's other moles.  Wound info provided.     2.  Multiple pigmented nevi; no other lesions of concern noted today.  Sun protection advised.     3.  Cherry angiomas; benign familial vascular papules.  No treatment suggested.     4.  History of  inflammatory papule on right forearm; no lesion present today.  There is postinflammatory hyperpigmentation suggesting where the lesion had previously been located.  I advised Mr. Lagos to photograph the spot should a papule reform and return to clinic for evaluation at that time.     5.  Xanthelasma of the lower eyelid area; treatment options could include trichloroacetic acid chemical peel versus electrodessication.  I discussed that both of these would be considered cosmetic treatments and unlikely to be covered by insurance.  The patient declines treatment today.      The patient to return pending biopsy result.  Thank you for this consultation.     Maryellen Boyle MD  Dermatology Staff       cc:   Greyson Mendez MD   [facility]   [address] [address]                         Maryellen Boyle MD

## 2017-06-21 PROBLEM — D18.01 CHERRY ANGIOMA: Status: ACTIVE | Noted: 2017-06-21

## 2017-06-21 PROBLEM — D22.9 MULTIPLE NEVI: Status: ACTIVE | Noted: 2017-06-21

## 2017-06-21 PROBLEM — D48.5 NEOPLASM OF UNCERTAIN BEHAVIOR OF SKIN: Status: ACTIVE | Noted: 2017-06-21

## 2017-06-21 RX ORDER — LIDOCAINE HYDROCHLORIDE AND EPINEPHRINE 10; 10 MG/ML; UG/ML
3 INJECTION, SOLUTION INFILTRATION; PERINEURAL ONCE
Qty: 3 ML | Refills: 0 | OUTPATIENT
Start: 2017-06-21 | End: 2017-06-21

## 2017-06-21 NOTE — PROGRESS NOTES
DERMATOLOGY CLINIC VISIT NOTE      CHIEF COMPLAINT:  Bump on arm.      DERMATOLOGY PROBLEM LIST:   1.  Cherry angiomas.   2.  Multiple pigmented nevi.   3.  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      HISTORY OF PRESENT ILLNESS:  Mr. Lagos is a 47-year-old male seen in Dermatology Clinic today at the request of Dr. Greyson Mendez for initial evaluation of a bump on his right forearm.  He states that approximately 2 years ago he feels that a mosquito bit him at this location.  It became red and inflamed since that point in time.  Since scheduling the appointment, however, the area has flattened and nearly resolved.  It does not bleed, does not hurt and is not currently pruritic.  He denies use of any topical agents to the site.  He had no past treatment at the area.      In addition, he has pink marks on his skin that he would like evaluated today.  He has no personal history of skin cancer or dysplastic nevi.  He has no other skin concerns today.      Patient Active Problem List   Diagnosis     Achilles tendonitis       Allergies   Allergen Reactions     Cats      Red itchy eyes      Dogs      Red itchy eyes      Dust Mites      Runny nose and dizzy         Current Outpatient Prescriptions   Medication     Fluticasone Propionate (FLONASE NA)     Loratadine (CLARITIN) 10 MG capsule     No current facility-administered medications for this visit.      FAMILY HISTORY:  Family History   Problem Relation Age of Onset     Prostate Cancer Father      age 65     DIABETES Mother      Heart Failure Paternal Grandmother      Colon Cancer No family hx of         REVIEW OF SYSTEMS:  Feeling well without additional skin concerns.      SOCIAL HISTORY:  The patient is .  He lives in [place].  He works for [employer].      PHYSICAL EXAMINATION:   There were no vitals taken for this visit.    GENERAL:  Mr. Lagos is a healthy-appearing 47-year-old male in no distress.   HEENT:  Conjunctivae are clear.    PULMONARY:  Breathing comfortably on room air.   ABDOMEN:  No abdominal distention line.   CARDIOVASCULAR:  Extremities warm, well perfused.   SKIN:  Examination today included the face, neck, chest, abdomen, back, arms and hands.  Skin exam was normal except for as follows:   -- Examination of the face shows scattered yellow papules at the infraorbital rim bilaterally.   -- The shoulders, upper chest, abdomen and back with scattered 2-3 mm cherry red vascular papules.   -- Scattered medium brown evenly pigmented 2-4 mm macules on upper back, chest and arms.   -- Right axillary vault with a dark brown to black slightly raised 6 mm papule with an eccentric fleshy black-brown papule.   -- Right lateral forearm with a 4 mm pink patch without overlying scale or epidermal change without nodularity or tenderness.      PROCEDURE NOTE:  The patient was consented to a shave biopsy of lesion on the right axillary vault.  Area was infiltrated with 1 mL of lidocaine with epinephrine and a DermaBlade was used to perform shave biopsy of lesion.  Aluminum chloride, petrolatum and a bandage applied and wound care instruction provided.      ASSESSMENT AND PLAN:   1.  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  Biopsy performed today and as this nevus was not typical of patient's other moles.  Wound info provided.     2.  Multiple pigmented nevi; no other lesions of concern noted today.  Sun protection advised.     3.  Cherry angiomas; benign familial vascular papules.  No treatment suggested.     4.  History of inflammatory papule on right forearm; no lesion present today.  There is postinflammatory hyperpigmentation suggesting where the lesion had previously been located.  I advised Mr. Lagos to photograph the spot should a papule reform and return to clinic for evaluation at that time.     5.  Xanthelasma of the  lower eyelid area; treatment options could include trichloroacetic acid chemical peel versus electrodessication.  I discussed that both of these would be considered cosmetic treatments and unlikely to be covered by insurance.  The patient declines treatment today.      The patient to return pending biopsy result.  Thank you for this consultation.     Maryellen Boyle MD  Dermatology Staff       cc:   Greyson Mendez MD   [facility]   [address] [address]

## 2017-06-26 ENCOUNTER — THERAPY VISIT (OUTPATIENT)
Dept: PHYSICAL THERAPY | Facility: CLINIC | Age: 47
End: 2017-06-26
Payer: COMMERCIAL

## 2017-06-26 DIAGNOSIS — M76.62 ACHILLES TENDINITIS OF LEFT LOWER EXTREMITY: ICD-10-CM

## 2017-06-26 PROCEDURE — 97110 THERAPEUTIC EXERCISES: CPT | Mod: GP | Performed by: PHYSICAL THERAPY ASSISTANT

## 2017-06-27 LAB — COPATH REPORT: NORMAL

## 2017-07-07 ENCOUNTER — THERAPY VISIT (OUTPATIENT)
Dept: PHYSICAL THERAPY | Facility: CLINIC | Age: 47
End: 2017-07-07
Payer: COMMERCIAL

## 2017-07-07 DIAGNOSIS — M76.62 ACHILLES TENDINITIS OF LEFT LOWER EXTREMITY: ICD-10-CM

## 2017-07-07 PROCEDURE — 97110 THERAPEUTIC EXERCISES: CPT | Mod: GP | Performed by: PHYSICAL THERAPIST

## 2017-07-07 PROCEDURE — 97140 MANUAL THERAPY 1/> REGIONS: CPT | Mod: GP | Performed by: PHYSICAL THERAPIST

## 2017-07-12 ENCOUNTER — THERAPY VISIT (OUTPATIENT)
Dept: PHYSICAL THERAPY | Facility: CLINIC | Age: 47
End: 2017-07-12
Payer: COMMERCIAL

## 2017-07-12 DIAGNOSIS — M76.62 ACHILLES TENDINITIS OF LEFT LOWER EXTREMITY: ICD-10-CM

## 2017-07-12 PROCEDURE — 97140 MANUAL THERAPY 1/> REGIONS: CPT | Mod: GP | Performed by: PHYSICAL THERAPY ASSISTANT

## 2017-07-12 PROCEDURE — 97110 THERAPEUTIC EXERCISES: CPT | Mod: GP | Performed by: PHYSICAL THERAPY ASSISTANT

## 2017-07-20 ENCOUNTER — THERAPY VISIT (OUTPATIENT)
Dept: PHYSICAL THERAPY | Facility: CLINIC | Age: 47
End: 2017-07-20
Payer: COMMERCIAL

## 2017-07-20 DIAGNOSIS — M76.62 ACHILLES TENDINITIS OF LEFT LOWER EXTREMITY: ICD-10-CM

## 2017-07-20 PROCEDURE — 97140 MANUAL THERAPY 1/> REGIONS: CPT | Mod: GP | Performed by: PHYSICAL THERAPY ASSISTANT

## 2017-07-20 PROCEDURE — 97110 THERAPEUTIC EXERCISES: CPT | Mod: GP | Performed by: PHYSICAL THERAPY ASSISTANT

## 2017-07-27 ENCOUNTER — THERAPY VISIT (OUTPATIENT)
Dept: PHYSICAL THERAPY | Facility: CLINIC | Age: 47
End: 2017-07-27
Payer: COMMERCIAL

## 2017-07-27 DIAGNOSIS — M76.62 ACHILLES TENDINITIS OF LEFT LOWER EXTREMITY: ICD-10-CM

## 2017-07-27 PROCEDURE — 97110 THERAPEUTIC EXERCISES: CPT | Mod: GP | Performed by: PHYSICAL THERAPY ASSISTANT

## 2017-07-27 PROCEDURE — 97140 MANUAL THERAPY 1/> REGIONS: CPT | Mod: GP | Performed by: PHYSICAL THERAPY ASSISTANT

## 2017-07-27 NOTE — MR AVS SNAPSHOT
After Visit Summary   7/27/2017    Riley Lagos    MRN: [id number]           Patient Information     Date Of Birth          1970        Visit Information        Provider Department      7/27/2017 3:50 PM Genevieve Smith PTA [facility]        Today's Diagnoses     Achilles tendinitis of left lower extremity           Follow-ups after your visit        Who to contact     If you have questions or need follow up information about today's clinic visit or your schedule please contact [facility] directly at [phone].  Normal or non-critical lab and imaging results will be communicated to you by qLearninghart, letter or phone within 4 business days after the clinic has received the results. If you do not hear from us within 7 days, please contact the clinic through qLearninghart or phone. If you have a critical or abnormal lab result, we will notify you by phone as soon as possible.  Submit refill requests through [employer] or call your pharmacy and they will forward the refill request to us. Please allow 3 business days for your refill to be completed.          Additional Information About Your Visit        MyChart Information     [employer] gives you secure access to your electronic health record. If you see a primary care provider, you can also send messages to your care team and make appointments. If you have questions, please call your primary care clinic.  If you do not have a primary care provider, please call [phone] and they will assist you.        Care EveryWhere ID     This is your Care EveryWhere ID. This could be used by other organizations to access your [facility] medical records  [id number]         Blood Pressure from Last 3 Encounters:   06/08/17 126/78   05/16/17 124/82   05/04/17 128/80    Weight from Last 3 Encounters:   06/08/17 107 kg (236 lb)   05/30/17 108.9 kg (240 lb)   05/16/17 108.9 kg (240 lb)              We Performed the Following     Manual Ther Tech,  1+Regions,  15 min     Therapeutic Exercises        Primary Care Provider Office Phone # Fax #    Greyson Mendez [phone]       [facility] [place] [address] [place] [address]        Equal Access to Services     OLINDA LAUREANO : Hadii carrie ku hadkieshao Soomaali, waaxda luqadaha, qaybta kaalmada adeegyada, waxmarin emilyin hayaan keithangelina onofre laprincessministerio modi. So [facility] [phone].    ATENCIÓN: Si habla español, tiene a mas disposición servicios gratuitos de asistencia lingüística. Llame al [phone].    We comply with applicable federal civil rights laws and [place] laws. We do not discriminate on the basis of race, color, national origin, age, disability sex, sexual orientation or gender identity.            Thank you!     Thank you for choosing [facility]  for your care. Our goal is always to provide you with excellent care. Hearing back from our patients is one way we can continue to improve our services. Please take a few minutes to complete the written survey that you may receive in the mail after your visit with us. Thank you!             Your Updated Medication List - Protect others around you: Learn how to safely use, store and throw away your medicines at www.disposemymeds.org.          This list is accurate as of: 7/27/17 11:59 PM.  Always use your most recent med list.                   Brand Name Dispense Instructions for use Diagnosis    CLARITIN 10 MG capsule   Generic drug:  loratadine      Take 10 mg by mouth daily.        FLONASE NA

## 2017-07-31 NOTE — PROGRESS NOTES
Subjective:    HPI                    Objective:    System    Physical Exam    General     ROS    Assessment/Plan:      DISCHARGE REPORT    Progress reporting period is from 6/19/17 to 7/27/17.      SUBJECTIVE  Subjective changes noted by patient:   Patient feels that he is 75% better and back to normal.  Slight pain initially stepping down the the stairs in the am. Patient is very happy with his outcome from this procedure and treatment.  Current pain level is .  Current Pain level: 2/10    Previous pain level was:   Initial Pain level: 2/10   Changes in function:  Yes (See Goal flowsheet attached for changes in current functional level)     Adverse reaction to treatment or activity: None     OBJECTIVE  Changes noted in objective findings:  Yes,   Objective: AROM DF=15, PF=60, MMT: Single leg heel raise 26 reps.  IV/EV 5/5.  Patient does have slight challenges with balance and proprioception exercises and have discussed to continue with those at home. Return back to normal [place].

## 2017-08-07 PROBLEM — M76.60 ACHILLES TENDONITIS: Status: RESOLVED | Noted: 2017-06-19 | Resolved: 2017-08-07

## 2017-08-07 NOTE — PROGRESS NOTES
Subjective:    HPI                    Objective:    System    Physical Exam    General     ROS    Assessment/Plan:      ASSESSMENT/PLAN  Updated problem list and treatment plan: Diagnosis 1:  Left ankle   Pain -  hot/cold therapy, manual therapy, self management, education and home program  Decreased ROM/flexibility - manual therapy, therapeutic exercise, therapeutic activity and home program  Decreased strength - therapeutic exercise, therapeutic activities and home program  Progress toward STG/LTGs have been made:  Yes,   Assessment of Progress: The patient's condition is improving.  Self Management Plans:  Patient has been instructed in a home treatment program.  I have re-evaluated this patient and find that the nature, scope, duration and intensity of the therapy is appropriate for the medical condition of the patient.  Riley continues to require the following intervention to meet STG and LT's:  PT intervention is no longer required to meet STG/LTG.    Recommendations:  This patient is ready to be discharged from therapy and continue their home treatment program.    Please refer to the daily flowsheet for treatment today, total treatment time and time spent performing 1:1 timed codes.

## 2018-03-13 ENCOUNTER — VIRTUAL VISIT (OUTPATIENT)
Dept: FAMILY MEDICINE | Facility: OTHER | Age: 48
End: 2018-03-13

## 2018-03-13 NOTE — PROGRESS NOTES
"Date:   Clinician: Nunu Jung  Clinician NPI: [id number]  Patient: ashely ANDERSEN  Patient : 1970  Patient Address: [address] [place] [address]  Patient Phone: [phone]  Visit Protocol: URI  Patient Summary:  ashely is a 48 year old ( : 1970 ) male who initiated a Visit for cold, sinus infection, or influenza. When asked the question \"Please sign me up to receive news, health information and promotions from [employer].\", ashely responded \"No\".    ashely states his symptoms started gradually 2-3 weeks ago.   His symptoms consist of malaise, myalgia, enlarged lymph nodes, a sore throat, a headache, rhinitis, tooth pain, a cough, chills, nasal congestion, and facial pain or pressure. ashely also feels feverish but was unable to measure his temperature.   Symptom details     Nasal secretions: The color of his mucus is green.    Cough: ashely coughs every 5-10 minutes and his cough is more bothersome at night. Phlegm comes into his throat when he coughs. He believes the phlegm causes the cough. The color of the phlegm is green.     Sore throat: ashely reports having mild throat pain (between 1-3 on a 10 point pain scale), has exudate on his tonsils, and is able to swallow liquids. The lymph nodes in his neck are enlarged. He states that rashes have not appeared on the skin since the sore throat started.     Facial pain or pressure: The facial pain or pressure feels worse when bending over or leaning forward.     Headache: He states the headache is moderate (between 4-6 on a 10 point pain scale).     Tooth pain: The tooth pain is not caused by a cavity, recent dental work, or other mouth problems.      ashely denies having wheezing, dyspnea, and ear pain. He also denies taking antibiotic medication for the symptoms, double sickening (worsening symptoms after initial improvement), having recent facial or sinus surgery in the past 60 days, and having a sinus infection " within the past year.   Within the past week, ashely has been exposed to someone with strep throat. He has not recently been exposed to someone with influenza. ashely has not been in close contact with any high risk individuals.   Weight: 235 lbs   ashely does not smoke or use smokeless tobacco.   MEDICATIONS:  Loratadine (Claritin, Tavist ND)   Patient free text response: flonase  , ALLERGIES:  NKDA   Clinician Response:  Dear ashely,  Based on the information provided, you have acute bacterial sinusitis, also known as a sinus infection. Sinus infections are caused by bacteria or a virus and symptoms are almost always identical. The difference between the 2 types of infections is timing.  Sinus infections start as viral infections and symptoms improve on their own in about 7 days. If symptoms have not improved after 7 days or have even worsened, a bacterial infection may have developed.  Medication information  I am prescribing:     Amoxicillin 500 mg oral tablet. Take 1 tablet by mouth 3 times a day for 10 days. There are no refills with this prescription.   Antibiotics can cause an allergic reaction even if you have taken them without a problem in the past. If you develop a new rash, swelling, or difficulty breathing, stop the medication and be seen in a clinic or urgent care immediately.  Self care  The following tips will keep you as comfortable as possible while you recover:     Rest    Drink plenty of water and other liquids    Take a hot shower to loosen congestion    Use throat lozenges    Gargle with warm salt water (1/4 teaspoon of salt per 8 ounce glass of water)    Suck on frozen items such as popsicles or ice cubes    Drink hot tea with lemon and honey    Take a spoonful of honey to reduce your cough     When to seek care  Please be seen in a clinic or urgent care if any of the following occur:     Symptoms do not start to improve after 3 days of treatment    New symptoms develop, or symptoms become  worse     Call 911 or go to the emergency room if you feel that your throat is closing off, you suddenly develop a rash, you are unable to swallow fluids, you are drooling, or you are having difficulty breathing.   Diagnosis: Acute bacterial sinusitis  Diagnosis ICD: J01.90  Prescription: amoxicillin 500 mg oral tablet 30 tablets, 10 days supply. Take 1 tablet by mouth 3 times a day for 10 days. Refills: 0, Refill as needed: no, Allow substitutions: yes  Pharmacy: [facility] [place] - [phone] - [address] , [place] [address]

## 2018-04-10 ENCOUNTER — OFFICE VISIT (OUTPATIENT)
Dept: ORTHOPEDICS | Facility: CLINIC | Age: 48
End: 2018-04-10
Payer: COMMERCIAL

## 2018-04-10 VITALS
BODY MASS INDEX: 29.22 KG/M2 | SYSTOLIC BLOOD PRESSURE: 114 MMHG | WEIGHT: 240 LBS | HEIGHT: 76 IN | DIASTOLIC BLOOD PRESSURE: 72 MMHG

## 2018-04-10 DIAGNOSIS — M67.88 ACHILLES TENDINOSIS OF LEFT LOWER EXTREMITY: Primary | ICD-10-CM

## 2018-04-10 PROCEDURE — 99213 OFFICE O/P EST LOW 20 MIN: CPT | Performed by: FAMILY MEDICINE

## 2018-04-10 RX ORDER — NITROGLYCERIN 0.1MG/HR
1 PATCH, TRANSDERMAL 24 HOURS TRANSDERMAL DAILY
Qty: 30 PATCH | Refills: 0 | Status: SHIPPED | OUTPATIENT
Start: 2018-04-10 | End: 2020-04-01

## 2018-04-10 NOTE — PATIENT INSTRUCTIONS
1. Achilles tendinosis of left lower extremity      Discussed exam and ultrasound - spurring / calcific projections into the achilles tendon  Continue with stretches / yoga for now  Nitroglycerin patch - OK to cut the patch in quarters, even if the pharmacist says you cannot.  Apply 1/4 patch daily to most painful area - move around ~1/2 cm from day to day to avoid skin irritation.  Start with wearing the patch 2-4 hours/day and remove if/when you start to develop headache or lightheadedness.  Next day, try to wear for a bit longer.  Over the course of ~2 weeks you should be able to put on the patch in the morning and take off the following morning.  Monitor for persistent headache, blurry vision, and/or lightheadedness.  Once you are wearing the patch for the full day, check your blood pressure once to make sure it is not too low    Follow up over [facility] in 4 weeks  or sooner if needed. Call direct clinic number [[phone]] at any time with questions or concerns.      NEW WEBSITE HAS LAUNCHED  [email or web address]    We care about your feedback and use it to improve our services. Please leave feedback on the Testimonials & Reviews page.

## 2018-04-10 NOTE — MR AVS SNAPSHOT
After Visit Summary   4/10/2018    Riley Lagos    MRN: [id number]           Patient Information     Date Of Birth          1970        Visit Information        Provider Department      4/10/2018 4:20 PM Millie Bingham DO [facility]        Today's Diagnoses     Achilles tendinosis of left lower extremity    -  1      Care Instructions    1. Achilles tendinosis of left lower extremity      Discussed exam and ultrasound - spurring / calcific projections into the achilles tendon  Continue with stretches / yoga for now  Nitroglycerin patch - OK to cut the patch in quarters, even if the pharmacist says you cannot.  Apply 1/4 patch daily to most painful area - move around ~1/2 cm from day to day to avoid skin irritation.  Start with wearing the patch 2-4 hours/day and remove if/when you start to develop headache or lightheadedness.  Next day, try to wear for a bit longer.  Over the course of ~2 weeks you should be able to put on the patch in the morning and take off the following morning.  Monitor for persistent headache, blurry vision, and/or lightheadedness.  Once you are wearing the patch for the full day, check your blood pressure once to make sure it is not too low    Follow up over [employer] in 4 weeks  or sooner if needed. Call direct clinic number [[phone]] at any time with questions or concerns.      NEW WEBSITE HAS LAUNCHED  [email or web address]    We care about your feedback and use it to improve our services. Please leave feedback on the Testimonials & Reviews page.              Follow-ups after your visit        Who to contact     If you have questions or need follow up information about today's clinic visit or your schedule please contact [facility] directly at [phone].  Normal or non-critical lab and imaging results will be communicated to you by MyChart, letter or phone within 4 business days after the clinic has  "received the results. If you do not hear from us within 7 days, please contact the clinic through [employer] or phone. If you have a critical or abnormal lab result, we will notify you by phone as soon as possible.  Submit refill requests through [employer] or call your pharmacy and they will forward the refill request to us. Please allow 3 business days for your refill to be completed.          Additional Information About Your Visit        Cardiovascular Provider [employer] Information     [employer] gives you secure access to your electronic health record. If you see a primary care provider, you can also send messages to your care team and make appointments. If you have questions, please call your primary care clinic.  If you do not have a primary care provider, please call [phone] and they will assist you.        Care EveryWhere ID     This is your Care EveryWhere ID. This could be used by other organizations to access your [facility] medical records  [id number]        Your Vitals Were     Height BMI (Body Mass Index)                6' 4\" (1.93 m) 29.21 kg/m2           Blood Pressure from Last 3 Encounters:   04/10/18 114/72   06/08/17 126/78   05/16/17 124/82    Weight from Last 3 Encounters:   04/10/18 240 lb (108.9 kg)   06/08/17 236 lb (107 kg)   05/30/17 240 lb (108.9 kg)              Today, you had the following     No orders found for display         Today's Medication Changes          These changes are accurate as of 4/10/18  7:04 PM.  If you have any questions, ask your nurse or doctor.               Start taking these medicines.        Dose/Directions    nitroGLYcerin 0.1 MG/HR 24 hr patch   Commonly known as:  NITRODUR   Used for:  Achilles tendinosis of left lower extremity   Started by:  Millie Bingham DO        Dose:  1 patch   Place 1 patch onto the skin daily for 10 days   Quantity:  30 patch   Refills:  0            Where to get your medicines      These medications were sent to [facility] - " [address]   [address], [place] [address]     Phone:  [phone]     nitroGLYcerin 0.1 MG/HR 24 hr patch                Primary Care Provider Office Phone # Fax #    Greyson Mendez [phone] [address] [place] [address]        Equal Access to Services     [facility]: Hadii aad ku hadasho Soomaali, waaxda luqadaha, qaybta kaalmada adeegyada, waxay idiin hayaan adeeg kharash la'aan . So [facility] [phone].    ATENCIÓN: Si habla español, tiene a mas disposición servicios gratuitos de asistencia lingüística. Llame al [phone].    We comply with applicable federal civil rights laws and [place] laws. We do not discriminate on the basis of race, color, national origin, age, disability, sex, sexual orientation, or gender identity.            Thank you!     Thank you for choosing [facility]  for your care. Our goal is always to provide you with excellent care. Hearing back from our patients is one way we can continue to improve our services. Please take a few minutes to complete the written survey that you may receive in the mail after your visit with us. Thank you!             Your Updated Medication List - Protect others around you: Learn how to safely use, store and throw away your medicines at www.disposemymeds.org.          This list is accurate as of 4/10/18  7:04 PM.  Always use your most recent med list.                   Brand Name Dispense Instructions for use Diagnosis    CLARITIN 10 MG capsule   Generic drug:  loratadine      Take 10 mg by mouth daily.        FLONASE NA           nitroGLYcerin 0.1 MG/HR 24 hr patch    NITRODUR    30 patch    Place 1 patch onto the skin daily for 10 days    Achilles tendinosis of left lower extremity

## 2018-04-10 NOTE — PROGRESS NOTES
"ASSESSMENT & PLAN    1. Achilles tendinosis of left lower extremity      Discussed exam and ultrasound - spurring / calcific projections into the achilles tendon  Continue with stretches / yoga for now  Rx for nitro  Ultrasound today shows same areas of calcific densities / calcaneal spurring as previous / day of PRP    Follow up over [facility] in 4 weeks  or sooner if needed. Call direct clinic number [[phone]] at any time with questions or concerns.      -----    SUBJECTIVE:  Riley Lagos is a 48 year old male who is seen in follow-up for chronic left heel/achilles pain.They were last seen 6/8/2017. Patient had a left Achilles tendon PRP injection completed on May 30, 2017.  Patient subsequently completed 6 sessions of physical therapy last summer.  Patient notes that the achilles pain virtually resolved after having the procedure and physical therapy last years.     Pain started to return about 3 months ago without any new injury/trauma. Pain seems similar to previously just not as severe. They indicate that their current pain level is 5/10. Pain is worse after prolonged inactivity/rest and sitting in a plantarflexed position for too long.     The patient is seen by themselves.    Patient's past medical, surgical, social, and family histories were reviewed today and no changes are noted.    REVIEW OF SYSTEMS:  Constitutional: NEGATIVE for fever, chills, change in weight  Skin: NEGATIVE for worrisome rashes, moles or lesions  GI/: NEGATIVE for bowel or bladder changes  Neuro: NEGATIVE for weakness, dizziness or paresthesias    OBJECTIVE:  Ht 6' 4\" (1.93 m)  Wt 240 lb (108.9 kg)  BMI 29.21 kg/m2   General: healthy, alert and in no distress  HEENT: no scleral icterus or conjunctival erythema  Skin: no suspicious lesions or rash. No jaundice.  CV: regular rhythm by palpation, no pedal edema  Resp: normal respiratory effort without conversational dyspnea   Psych: normal mood and affect  Gait: normal steady " gait with appropriate coordination and balance  Neuro: normal light touch sensory exam of the extremities.    MSK:  LEFT HEEL  Slight thickening at distal achilles. Nontender at watershed. Tender at insertion over calcaneal spurring.    Patient's conditions were thoroughly discussed during today's visit with greater than 50% of the visit spent counseling the patient with total time spent face-to-face with the patient being 20 minutes.    Millie Bingham DO [facility]

## 2018-04-10 NOTE — LETTER
"    4/10/2018         RE: Riley Lagos  [address]  [place] [address]        Dear Colleague,    Thank you for referring your patient, Riley Lagos, to the [facility]. Please see a copy of my visit note below.    ASSESSMENT & PLAN    1. Achilles tendinosis of left lower extremity      Discussed exam and ultrasound - spurring / calcific projections into the achilles tendon  Continue with stretches / yoga for now  Rx for nitro  Ultrasound today shows same areas of calcific densities / calcaneal spurring as previous / day of PRP    Follow up over [facility] in 4 weeks  or sooner if needed. Call direct clinic number [[phone]] at any time with questions or concerns.      -----    SUBJECTIVE:  Riley Lagos is a 48 year old male who is seen in follow-up for chronic left heel/achilles pain.They were last seen 6/8/2017. Patient had a left Achilles tendon PRP injection completed on May 30, 2017.  Patient subsequently completed 6 sessions of physical therapy last summer.  Patient notes that the achilles pain virtually resolved after having the procedure and physical therapy last years.     Pain started to return about 3 months ago without any new injury/trauma. Pain seems similar to previously just not as severe. They indicate that their current pain level is 5/10. Pain is worse after prolonged inactivity/rest and sitting in a plantarflexed position for too long.     The patient is seen by themselves.    Patient's past medical, surgical, social, and family histories were reviewed today and no changes are noted.    REVIEW OF SYSTEMS:  Constitutional: NEGATIVE for fever, chills, change in weight  Skin: NEGATIVE for worrisome rashes, moles or lesions  GI/: NEGATIVE for bowel or bladder changes  Neuro: NEGATIVE for weakness, dizziness or paresthesias    OBJECTIVE:  Ht 6' 4\" (1.93 m)  Wt 240 lb (108.9 kg)  BMI 29.21 kg/m2   General: healthy, alert and in no distress  HEENT: no " scleral icterus or conjunctival erythema  Skin: no suspicious lesions or rash. No jaundice.  CV: regular rhythm by palpation, no pedal edema  Resp: normal respiratory effort without conversational dyspnea   Psych: normal mood and affect  Gait: normal steady gait with appropriate coordination and balance  Neuro: normal light touch sensory exam of the extremities.    MSK:  LEFT HEEL  Slight thickening at distal achilles. Nontender at watershed. Tender at insertion over calcaneal spurring.    Patient's conditions were thoroughly discussed during today's visit with greater than 50% of the visit spent counseling the patient with total time spent face-to-face with the patient being 20 minutes.    Millie Bingham, DO [facility]          Again, thank you for allowing me to participate in the care of your patient.        Sincerely,        Millie Bingham DO

## 2018-06-11 ENCOUNTER — MYC MEDICAL ADVICE (OUTPATIENT)
Dept: ORTHOPEDICS | Facility: CLINIC | Age: 48
End: 2018-06-11

## 2019-09-29 ENCOUNTER — HEALTH MAINTENANCE LETTER (OUTPATIENT)
Age: 49
End: 2019-09-29

## 2020-03-23 ENCOUNTER — RESULTS ONLY (OUTPATIENT)
Dept: LAB | Age: 50
End: 2020-03-23

## 2020-03-23 ENCOUNTER — OFFICE VISIT (OUTPATIENT)
Dept: URGENT CARE | Facility: URGENT CARE | Age: 50
End: 2020-03-23
Payer: COMMERCIAL

## 2020-03-23 DIAGNOSIS — Z20.822 SUSPECTED COVID-19 VIRUS INFECTION: Primary | ICD-10-CM

## 2020-03-23 PROCEDURE — 99213 OFFICE O/P EST LOW 20 MIN: CPT | Performed by: NURSE PRACTITIONER

## 2020-03-23 NOTE — PROGRESS NOTES
Chief Complaint:    COVID-19 evaluation    HPI: Riley Lagos is an 50 year old male who has been triaged via employee health for possible COVID-19 testing.  Patient was not examined in clinic today.      Patient remained in their car during collection process.  Droplet precautions + contact precautions + eye protection were in effect during collection process.  PPE included gown, surgical mask, face shield, and gloves.    Patient Assessment    Patient is stable and in no respiratory distress.  Patient does not appear toxic.    Medical Decision Making:    Patient does meet criteria for COVID testing per [employer].     PLAN:    No results found for any visits on 03/23/20.     Covid-19 NP swab collected.  These will be sent to the [facility] by lab staff.    Patient advised to stay home with mild symptoms and follow home care symptom management.    Instructions Given to Patient    Isolate Yourself:    Isolate yourself while traveling.    Do Not allow any visitors within 6 feet.    Do Not go to work or school.    Do Not go to Yazdan[employer],  centers, shopping, or other public places.    Do Not shake hands.    Avoid close contact with others (hugging, kissing).    Protect Others:    Cover Your Mouth and Nose with a mask, disposable tissue or wash cloth to avoid spreading germs to others.    Wash your hands and face frequently with soap and water    Fever Medicines:    For fever relief, take acetaminophen or ibuprofen.    Treat fevers above 101  F (38.3  C) to lower fevers and make you more comfortable.     Acetaminophen (e.g., Tylenol): Take 650 mg (two 325 mg pills) by mouth every 4-6 hours as needed of regular strength Tylenol or 1,000 mg (two 500 mg pills) every 8 hours as needed of Extra Strength Tylenol.     Ibuprofen (e.g., Motrin, Advil): Take 400 mg (two 200 mg pills) by mouth every 6 hours as needed.     Acetaminophen is thought to be safer than ibuprofen or naproxen for people over 65 years old. Acetaminophen is in  many OTC and prescription medicines. It might be in more than one medicine that you are taking. You need to be careful and not take an overdose. Before taking any medicine, read all the instructions on the package.    Caution -NSAIDs (e.g., ibuprofen, naproxen): Do not take nonsteroidal anti-inflammatory drugs (NSAIDs) if you have stomach problems, kidney disease, heart failure, or other contraindications to using this type of medicine. Do not take NSAID medicines for over 7 days without consulting your PCP. Do not take NSAID medicines if you are pregnant. Do not take NSAID medicines if you are also taking blood thinners.     Thank you for limiting contact with others, wearing a simple mask to cover your cough, practice good hand hygiene habits and accessing our virtual services where possible to limit the spread of this virus.    For more information about COVID19 and options for caring for yourself at home, please visit the CDC website at https://www.cdc.gov/coronavirus/2019-ncov/about/steps-when-sick.html  For more options for care at [facility], please visit our website at [email or web address]        Emma Willard CNP 03/23/205:12 PM

## 2020-03-23 NOTE — PATIENT INSTRUCTIONS
Please use the information at the end of this document to sign up for [facility] where you can get your results and a message about those results sent to you through the [email or web address] application. If you do not have mychart we will call you with your results but it may take longer.    Regardless of if you have been tested or not:  Patient who have symptoms (cough, fever, or shortness of breath), need to isolate for 7 days from when symptoms started OR 72 hours after fever resolves (without fever reducing medications) AND improvement of respiratory symptoms (whichever is longer).      Isolate yourself at home (in own room/own bathroom if possible)    Do Not allow any visitors    Do Not go to work or school    Do Not go to Bahai,  centers, shopping, or other public places.    Do Not shake hands.    Avoid close and intimate contact with others (hugging, kissing).    Follow CDC recommendations for household cleaning of frequently touched services.     After the initial 7 days, continue to isolate yourself from household members as much as possible. To continue decrease the risk of community spread and exposure, you and any members of your household should limit activities in public for 14 days after starting home isolation.     You can reference the following CDC link for helpful home isolation/care tips:  https://www.cdc.gov/coronavirus/2019-ncov/downloads/10Things.pdf    Protect Others:    Cover Your Mouth and Nose with a mask, disposable tissue or wash cloth to avoid spreading germs to others.    Wash your hands and face frequently with soap and water    Call Back If: Breathing difficulty develops or you become worse.    For more information about COVID19 and options for caring for yourself at home, please visit the CDC website at https://www.cdc.gov/coronavirus/2019-ncov/about/steps-when-sick.html  For more options for care at [facility], please visit our website at  [email or web address]

## 2020-03-27 LAB
SARS-COV-2 RNA SPEC QL NAA+PROBE: NOT DETECTED
SPECIMEN SOURCE: NORMAL

## 2020-03-28 ENCOUNTER — VIRTUAL VISIT (OUTPATIENT)
Dept: FAMILY MEDICINE | Facility: OTHER | Age: 50
End: 2020-03-28

## 2020-03-28 NOTE — PROGRESS NOTES
"Date: 2020 16:14:10  Clinician: Angie Smith  Clinician NPI: [id number]  Patient: Riley Lagos  Patient : 1970  Patient Address: [address] [place], [place] [address] [address]  Patient Phone: [phone]  Visit Protocol: URI  Patient Summary:  Riley is a 50 year old ( : 1970 ) male who initiated a Visit for cold, sinus infection, or influenza. When asked the question \"Please sign me up to receive news, health information and promotions. \", Riley responded \"No\".    Riley states his symptoms started suddenly 7-9 days ago. After his symptoms started, they improved and then got worse again.   His symptoms consist of myalgia, chills, and malaise. Riley also feels feverish.   Symptom details   Temperature: His current temperature is 100.3 degrees Fahrenheit. Riley has had a temperature over 100 degrees Fahrenheit for 5-7 days.    Riley denies having ear pain, rhinitis, teeth pain, headache, facial pain or pressure, wheezing, sore throat, cough, nasal congestion, and enlarged lymph nodes. He also denies having a sinus infection within the past year, taking antibiotic medication for the symptoms, and having recent facial or sinus surgery in the past 60 days. He is not experiencing dyspnea.   Precipitating events  He has recently been exposed to someone with influenza. Riley has been in close contact with the following high risk individuals: immunocompromised people.   Pertinent COVID-19 (Coronavirus) information  Riley has not traveled internationally or to the areas where COVID-19 (Coronavirus) is widespread, including cruise ship travel in the last 14 days before the start of his symptoms.   Riley has had a close contact with a laboratory-confirmed COVID-19 patient within 14 days of symptom onset. He also has had a close contact with a suspected COVID-19 patient within 14 days of symptom onset. Additional information about contact with COVID-19 (Coronavirus) patient as reported by the " "patient (free text): office contact with an individual who later tested positive.   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 He lives with a healthcare worker.   Pertinent medical history  Riley does not need a return to work/school note.   Weight: 238 lbs   Riley does not smoke or use smokeless tobacco.   Additional information as reported by the patient (free text): i was tested for covid-19 on Monday 3/23.  The results were \"not detected\"   Weight: 238 lbs    MEDICATIONS: Flonase Allergy Relief nasal, Claritin oral, ALLERGIES: NKDA  Clinician Response:  Dear Riley,   Based on the information you have provided, you do have symptoms that are consistent with Coronavirus (COVID-19).  The coronavirus causes mild to severe respiratory illness with the most common symptoms including fever, cough and difficulty breathing. Unfortunately, many viruses cause similar symptoms and it can be difficult to distinguish between viruses, especially in mild cases, so we are presuming that anyone with cough or fever has coronavirus at this time.  Coronavirus/COVID-19 has reached the point of community spread in [place], meaning that we are finding the virus in people with no known exposure risk for nahum the virus. Given the increasing commonness of coronavirus in the community we are no longer testing patients who are not critically ill.  If you are a health care worker, you should refer to your employee health office for instructions about testing and returning to work.  For everyone else who has cough or fever, you should assume you are infected with coronavirus. Since you will not be tested but have symptoms that may be consistent with coronavirus, the CDC recommends you stay in self-isolation until these three things have happened:    You have had no fever for at least 72 hours (that is three full days of no fever without the " use of medicine that reduces fevers)    AND   Other symptoms have improved (for example, when your cough or shortness of breath have improved)   AND   At least 7 days have passed since your symptoms first appeared.   How to Isolate:   Isolate yourself at home.  Do Not allow any visitors  Do Not go to work or school  Do Not go to Anab[employer]ist,  centers, shopping, or other public places.  Do Not shake hands.  Avoid close contact with others (hugging, kissing).   Protect Others:   Cover Your Mouth and Nose with a mask, disposable tissue or wash cloth to avoid spreading germs to others.  Wash your hands and face frequently with soap and water.   We know it can be scary to hear that you might have COVID-19. Our team can help track your symptoms and make sure you are doing ok over the next two weeks using a program called [employer] to keep in touch. When you receive an email from [employer], please consider enrolling in our monitoring program. There is no cost to you for monitoring. Here is a URL where you can learn more: [email or web address]  Managing Symptoms:   At this time, we primarily recommend Tylenol (Acetaminophen) for fever or pain. If you have liver or kidney problems, contact your primary care provider for instructions on use of tylenol. Adults can take 650 mg (two 325 mg pills) by mouth every 4-6 hours as needed OR 1,000 mg (two 500 mg pills) every 8 hours as needed. MAXIMUM DAILY DOSE: 3,000mg. For children, refer to dosing on bottle based on age or weight.   If you develop significant shortness of breath that prevents you from doing normal activities, please call 911 or proceed to the nearest emergency room and alert them immediately that you have been in self-isolation for possible coronavirus.  If you have a higher risk medical condition such as cancer, heart failure, end stage renal disease on dialysis or have a transplant, please reach out to your specialist's clinic to advise them of  your [facility] visit should you not improve within the next two days.   For more information about COVID19 and options for caring for yourself at home, please visit the CDC website at https://www.cdc.gov/coronavirus/2019-ncov/about/steps-when-sick.htmlFor more options for care at [facility], please visit our website at [email or web address]    Diagnosis: Acute upper respiratory infection, unspecified  Diagnosis ICD: J06.9  Additional Clinician Notes: Your negative test is noted but your symptoms are still concerning for COVID-19.

## 2020-04-01 ENCOUNTER — TELEPHONE (OUTPATIENT)
Dept: PEDIATRICS | Facility: CLINIC | Age: 50
End: 2020-04-01

## 2020-04-01 ENCOUNTER — APPOINTMENT (OUTPATIENT)
Dept: GENERAL RADIOLOGY | Facility: CLINIC | Age: 50
DRG: 177 | End: 2020-04-01
Attending: EMERGENCY MEDICINE
Payer: COMMERCIAL

## 2020-04-01 ENCOUNTER — APPOINTMENT (OUTPATIENT)
Dept: CT IMAGING | Facility: CLINIC | Age: 50
DRG: 177 | End: 2020-04-01
Attending: EMERGENCY MEDICINE
Payer: COMMERCIAL

## 2020-04-01 ENCOUNTER — HOSPITAL ENCOUNTER (INPATIENT)
Facility: CLINIC | Age: 50
LOS: 2 days | Discharge: HOME | DRG: 177 | End: 2020-04-03
Attending: EMERGENCY MEDICINE | Admitting: INTERNAL MEDICINE
Payer: COMMERCIAL

## 2020-04-01 DIAGNOSIS — N20.0 KIDNEY STONE ON LEFT SIDE: ICD-10-CM

## 2020-04-01 DIAGNOSIS — J18.9 COMMUNITY ACQUIRED PNEUMONIA OF RIGHT LUNG, UNSPECIFIED PART OF LUNG: ICD-10-CM

## 2020-04-01 DIAGNOSIS — J18.9 PNEUMONIA DUE TO INFECTIOUS ORGANISM, UNSPECIFIED LATERALITY, UNSPECIFIED PART OF LUNG: Primary | ICD-10-CM

## 2020-04-01 LAB
ALBUMIN SERPL-MCNC: 3.3 G/DL (ref 3.4–5)
ALBUMIN UR-MCNC: 100 MG/DL
ALP SERPL-CCNC: 113 U/L (ref 40–150)
ALT SERPL W P-5'-P-CCNC: 78 U/L (ref 0–70)
ANION GAP SERPL CALCULATED.3IONS-SCNC: 7 MMOL/L (ref 3–14)
APPEARANCE UR: CLEAR
AST SERPL W P-5'-P-CCNC: 29 U/L (ref 0–45)
BASOPHILS # BLD AUTO: 0 10E9/L (ref 0–0.2)
BASOPHILS NFR BLD AUTO: 0.2 %
BILIRUB SERPL-MCNC: 0.7 MG/DL (ref 0.2–1.3)
BILIRUB UR QL STRIP: NEGATIVE
BUN SERPL-MCNC: 17 MG/DL (ref 7–30)
CALCIUM SERPL-MCNC: 8.5 MG/DL (ref 8.5–10.1)
CHLORIDE SERPL-SCNC: 106 MMOL/L (ref 94–109)
CK SERPL-CCNC: 53 U/L (ref 30–300)
CO2 SERPL-SCNC: 24 MMOL/L (ref 20–32)
COLOR UR AUTO: YELLOW
CREAT SERPL-MCNC: 0.82 MG/DL (ref 0.66–1.25)
CRP SERPL-MCNC: 108 MG/L (ref 0–8)
DIFFERENTIAL METHOD BLD: ABNORMAL
EOSINOPHIL # BLD AUTO: 0 10E9/L (ref 0–0.7)
EOSINOPHIL NFR BLD AUTO: 0.2 %
ERYTHROCYTE [DISTWIDTH] IN BLOOD BY AUTOMATED COUNT: 12.8 % (ref 10–15)
GFR SERPL CREATININE-BSD FRML MDRD: >90 ML/MIN/{1.73_M2}
GLUCOSE SERPL-MCNC: 100 MG/DL (ref 70–99)
GLUCOSE UR STRIP-MCNC: NEGATIVE MG/DL
HCT VFR BLD AUTO: 45.3 % (ref 40–53)
HGB BLD-MCNC: 14.7 G/DL (ref 13.3–17.7)
HGB UR QL STRIP: ABNORMAL
IMM GRANULOCYTES # BLD: 0.1 10E9/L (ref 0–0.4)
IMM GRANULOCYTES NFR BLD: 1.5 %
KETONES UR STRIP-MCNC: 60 MG/DL
LACTATE BLD-SCNC: 0.8 MMOL/L (ref 0.7–2)
LEUKOCYTE ESTERASE UR QL STRIP: NEGATIVE
LYMPHOCYTES # BLD AUTO: 0.7 10E9/L (ref 0.8–5.3)
LYMPHOCYTES NFR BLD AUTO: 10.7 %
MCH RBC QN AUTO: 27.7 PG (ref 26.5–33)
MCHC RBC AUTO-ENTMCNC: 32.5 G/DL (ref 31.5–36.5)
MCV RBC AUTO: 85 FL (ref 78–100)
MONOCYTES # BLD AUTO: 0.3 10E9/L (ref 0–1.3)
MONOCYTES NFR BLD AUTO: 5.4 %
MUCOUS THREADS #/AREA URNS LPF: PRESENT /LPF
NEUTROPHILS # BLD AUTO: 5 10E9/L (ref 1.6–8.3)
NEUTROPHILS NFR BLD AUTO: 82 %
NITRATE UR QL: NEGATIVE
NRBC # BLD AUTO: 0 10*3/UL
NRBC BLD AUTO-RTO: 0 /100
PH UR STRIP: 6 PH (ref 5–7)
PLATELET # BLD AUTO: 199 10E9/L (ref 150–450)
POTASSIUM SERPL-SCNC: 3.9 MMOL/L (ref 3.4–5.3)
PROT SERPL-MCNC: 7.1 G/DL (ref 6.8–8.8)
RBC # BLD AUTO: 5.31 10E12/L (ref 4.4–5.9)
RBC #/AREA URNS AUTO: >182 /HPF (ref 0–2)
SODIUM SERPL-SCNC: 137 MMOL/L (ref 133–144)
SOURCE: ABNORMAL
SP GR UR STRIP: 1.03 (ref 1–1.03)
SQUAMOUS #/AREA URNS AUTO: <1 /HPF (ref 0–1)
UROBILINOGEN UR STRIP-MCNC: 2 MG/DL (ref 0–2)
WBC # BLD AUTO: 6.1 10E9/L (ref 4–11)
WBC #/AREA URNS AUTO: 2 /HPF (ref 0–5)

## 2020-04-01 PROCEDURE — 25000128 H RX IP 250 OP 636: Performed by: EMERGENCY MEDICINE

## 2020-04-01 PROCEDURE — 84145 PROCALCITONIN (PCT): CPT | Performed by: EMERGENCY MEDICINE

## 2020-04-01 PROCEDURE — 99285 EMERGENCY DEPT VISIT HI MDM: CPT | Mod: 25

## 2020-04-01 PROCEDURE — 74177 CT ABD & PELVIS W/CONTRAST: CPT

## 2020-04-01 PROCEDURE — 85025 COMPLETE CBC W/AUTO DIFF WBC: CPT | Performed by: EMERGENCY MEDICINE

## 2020-04-01 PROCEDURE — 86140 C-REACTIVE PROTEIN: CPT | Performed by: EMERGENCY MEDICINE

## 2020-04-01 PROCEDURE — 96365 THER/PROPH/DIAG IV INF INIT: CPT | Mod: 59

## 2020-04-01 PROCEDURE — 87635 SARS-COV-2 COVID-19 AMP PRB: CPT | Performed by: EMERGENCY MEDICINE

## 2020-04-01 PROCEDURE — 82550 ASSAY OF CK (CPK): CPT | Performed by: EMERGENCY MEDICINE

## 2020-04-01 PROCEDURE — 81001 URINALYSIS AUTO W/SCOPE: CPT | Performed by: EMERGENCY MEDICINE

## 2020-04-01 PROCEDURE — 12000000 ZZH R&B MED SURG/OB

## 2020-04-01 PROCEDURE — 99223 1ST HOSP IP/OBS HIGH 75: CPT | Mod: AI | Performed by: PHYSICIAN ASSISTANT

## 2020-04-01 PROCEDURE — 80053 COMPREHEN METABOLIC PANEL: CPT | Performed by: EMERGENCY MEDICINE

## 2020-04-01 PROCEDURE — 83605 ASSAY OF LACTIC ACID: CPT | Performed by: EMERGENCY MEDICINE

## 2020-04-01 PROCEDURE — 87040 BLOOD CULTURE FOR BACTERIA: CPT | Performed by: EMERGENCY MEDICINE

## 2020-04-01 PROCEDURE — 71045 X-RAY EXAM CHEST 1 VIEW: CPT

## 2020-04-01 PROCEDURE — 25000125 ZZHC RX 250: Performed by: EMERGENCY MEDICINE

## 2020-04-01 PROCEDURE — 25000132 ZZH RX MED GY IP 250 OP 250 PS 637: Performed by: EMERGENCY MEDICINE

## 2020-04-01 RX ORDER — AMOXICILLIN 250 MG
1 CAPSULE ORAL 2 TIMES DAILY
Status: DISCONTINUED | OUTPATIENT
Start: 2020-04-01 | End: 2020-04-03 | Stop reason: HOSPADM

## 2020-04-01 RX ORDER — SODIUM CHLORIDE 9 MG/ML
INJECTION, SOLUTION INTRAVENOUS CONTINUOUS
Status: ACTIVE | OUTPATIENT
Start: 2020-04-01 | End: 2020-04-02

## 2020-04-01 RX ORDER — ONDANSETRON 4 MG/1
4 TABLET, ORALLY DISINTEGRATING ORAL EVERY 6 HOURS PRN
Status: DISCONTINUED | OUTPATIENT
Start: 2020-04-01 | End: 2020-04-03 | Stop reason: HOSPADM

## 2020-04-01 RX ORDER — CEFTRIAXONE 2 G/1
2 INJECTION, POWDER, FOR SOLUTION INTRAMUSCULAR; INTRAVENOUS ONCE
Status: COMPLETED | OUTPATIENT
Start: 2020-04-01 | End: 2020-04-01

## 2020-04-01 RX ORDER — CEFTRIAXONE 2 G/1
2 INJECTION, POWDER, FOR SOLUTION INTRAMUSCULAR; INTRAVENOUS EVERY 24 HOURS
Status: DISCONTINUED | OUTPATIENT
Start: 2020-04-02 | End: 2020-04-03 | Stop reason: HOSPADM

## 2020-04-01 RX ORDER — LIDOCAINE 40 MG/G
CREAM TOPICAL
Status: DISCONTINUED | OUTPATIENT
Start: 2020-04-01 | End: 2020-04-03 | Stop reason: HOSPADM

## 2020-04-01 RX ORDER — AZITHROMYCIN 250 MG/1
500 TABLET, FILM COATED ORAL ONCE
Status: COMPLETED | OUTPATIENT
Start: 2020-04-01 | End: 2020-04-01

## 2020-04-01 RX ORDER — NALOXONE HYDROCHLORIDE 0.4 MG/ML
.1-.4 INJECTION, SOLUTION INTRAMUSCULAR; INTRAVENOUS; SUBCUTANEOUS
Status: DISCONTINUED | OUTPATIENT
Start: 2020-04-01 | End: 2020-04-03 | Stop reason: HOSPADM

## 2020-04-01 RX ORDER — AMOXICILLIN 250 MG
2 CAPSULE ORAL 2 TIMES DAILY
Status: DISCONTINUED | OUTPATIENT
Start: 2020-04-01 | End: 2020-04-03 | Stop reason: HOSPADM

## 2020-04-01 RX ORDER — ONDANSETRON 2 MG/ML
4 INJECTION INTRAMUSCULAR; INTRAVENOUS EVERY 6 HOURS PRN
Status: DISCONTINUED | OUTPATIENT
Start: 2020-04-01 | End: 2020-04-03 | Stop reason: HOSPADM

## 2020-04-01 RX ORDER — IOPAMIDOL 755 MG/ML
200 INJECTION, SOLUTION INTRAVASCULAR ONCE
Status: COMPLETED | OUTPATIENT
Start: 2020-04-01 | End: 2020-04-01

## 2020-04-01 RX ORDER — ACETAMINOPHEN 325 MG/1
650 TABLET ORAL EVERY 4 HOURS PRN
Status: DISCONTINUED | OUTPATIENT
Start: 2020-04-01 | End: 2020-04-03 | Stop reason: HOSPADM

## 2020-04-01 RX ADMIN — CEFTRIAXONE 2 G: 2 INJECTION, POWDER, FOR SOLUTION INTRAMUSCULAR; INTRAVENOUS at 21:44

## 2020-04-01 RX ADMIN — IOPAMIDOL 100 ML: 755 INJECTION, SOLUTION INTRAVENOUS at 20:59

## 2020-04-01 RX ADMIN — AZITHROMYCIN MONOHYDRATE 500 MG: 250 TABLET ORAL at 21:43

## 2020-04-01 RX ADMIN — SODIUM CHLORIDE 65 ML: 9 INJECTION, SOLUTION INTRAVENOUS at 21:01

## 2020-04-01 ASSESSMENT — ENCOUNTER SYMPTOMS
SHORTNESS OF BREATH: 1
FATIGUE: 1
FEVER: 1
COUGH: 1

## 2020-04-01 NOTE — TELEPHONE ENCOUNTER
Received call from pt's wife. She is very concerned because pt has an axillary temp of 103, has been sick for 11 days and is very weak.   Respirations are shallow. Wife is a NP.  She listened to his lungs, she reports breath sounds as decreased.    He can hardly get off the couch  PO intake is poor    Tested for Covid -19 3/23/20 Negative per wife.    Advised wife that pt needs to be evaluated in the ER.    Wife verbalized understanding and agrees to the plan.    Jesus Velazquez RN on 4/1/2020 at 5:26 PM

## 2020-04-01 NOTE — ED PROVIDER NOTES
"  History     Chief Complaint:  Fever     HPI   Riley Lagos is a 50 year old male who presents to the emergency department for evaluation of fever. The patient reports he has experienced around 11 days of fever (recorded 103 F at home), cough, shortness of breath, and fatigue. He indicates these symptoms have not improved since onset, and he has concern for known COVID-19 contact, prompting his arrival to the ED. The patient has been taking 800 mg ibuprofen and 1000 mg Tylenol every 6 hours for symptom management with moderate improvement.  He denies any rash or dysuria.  He does note darkened urine, but attributes that to dehydration.  He has been taking Tylenol and ibuprofen almost around the clock to control his fever.    Allergies:  Cats  Dogs  Dust Mites  NKDA     Medications:    Claritin  Nitrodur    Past Medical History:    Allergic rhinitis  Kidney stone  Neoplasm of skin    Past Surgical History:    Appendectomy  Lasix  Left knee surgery     Family History:    Prostate cancer  Diabetes    Social History:  Never smoker.  Positive for alcohol use.   Marital Status:   [2]     Review of Systems   Constitutional: Positive for fatigue and fever.   Respiratory: Positive for cough and shortness of breath.    Musculoskeletal: Positive for myalgias.   All other systems reviewed and are negative.      Physical Exam     Patient Vitals for the past 24 hrs:   BP Temp Temp src Pulse Heart Rate Resp SpO2 Height Weight   04/02/20 0139 -- 99.3  F (37.4  C) Oral -- 72 -- 94 % -- --   04/02/20 0024 -- -- -- -- -- -- -- 1.93 m (6' 4\") 109.1 kg (240 lb 9.6 oz)   04/02/20 0010 131/71 100.3  F (37.9  C) Oral -- 79 16 96 % -- --   04/01/20 2336 -- -- -- -- -- 16 -- -- --   04/01/20 2330 -- -- -- -- -- -- 97 % -- --   04/01/20 2300 -- -- -- -- -- -- 95 % -- --   04/01/20 2230 -- -- -- 75 -- -- 94 % -- --   04/01/20 2200 -- -- -- 74 -- -- 97 % -- --   04/01/20 2130 -- -- -- 77 -- -- 96 % -- --   04/01/20 2115 -- -- -- -- " -- -- 96 % -- --   04/01/20 2100 129/81 -- -- -- -- -- -- -- --   04/01/20 2030 126/71 -- -- 75 -- -- 92 % -- --   04/01/20 2005 -- -- -- -- -- -- 93 % -- --   04/01/20 2000 124/77 -- -- 76 -- -- 90 % -- --   04/01/20 1930 120/80 -- -- -- -- -- -- -- --   04/01/20 1813 (!) 146/98 99.2  F (37.3  C) Oral -- 93 18 96 % -- --     Physical Exam  Nursing note and vitals reviewed.  Constitutional: Cooperative.   HENT:   Mouth/Throat: Moist mucous membranes.   Eyes: EOMI, nonicteric sclera  Cardiovascular: Normal rate, regular rhythm, no murmurs, rubs, or gallops  Pulmonary/Chest: Effort normal. Left-sided wheezes/rhonchi noted.   Abdominal: Soft. Nontender, nondistended, no guarding or rigidity. BS present.   Musculoskeletal: Normal range of motion.   Neurological: Alert. Moves all extremities spontaneously.   Skin: Skin is warm and dry. No rash noted.   Psychiatric: Normal mood and affect.       Emergency Department Course   Imaging:  Radiology findings were communicated with the patient who voiced understanding of the findings.    XR Chest Port 1 View:  Patchy infiltrate in the right upper lobe and also likely in the right middle lobe consistent with pneumonia. No pleural effusion.  As per radiology.    CT Abdomen/Pelvis, IV contrast only Trauma/AAA:  1.  14 mm obstructing calculus in the proximal left ureter, with mild left hydronephrosis.  2.  Right lower lobe pneumonia.  As per radiology.    Laboratory:  Laboratory findings were communicated with the patient who voiced understanding of the findings.    CBC: WBC: 6.1, HGB: 14.7, PLT: 199  CMP: Glucose 100 (H), Albumin 3.3 (L), ALT 78 (H), o/w WNL (Creatinine: 0.82)    Lactic acid: 0.8    CRP inflammation: 108.0 (H)    CK total: 53    UA with micro: Ketones 60, Blood Moderate, Albumin 100, RBC >182 (H), Mucous Present, o/w negative    Blood culture x2 pending.  COVID-19 Virus by PCR NP swab pending.    Interventions:  2143 Zithromax 500 mg PO  2144 Rocephin 2 g  IV    Emergency Department Course:  Past medical records, nursing notes, and vitals reviewed.    1855 I performed an exam of the patient as documented above.     IV was inserted and blood was drawn for laboratory testing, results above.    The patient provided a urine sample here in the emergency department. This was sent for laboratory testing, findings above.    The patient was sent for a XR Chest while in the emergency department, results above.     2036 I rechecked the patient and discussed the results of his workup thus far.     2154 I spoke with RENNY Medrano for hospitalist Dr. Mcmanus, who agreed to admit the patient.    Findings and plan explained to the Patient who consents to admission. Discussed the patient with RENNY Medrano, who will admit the patient to a med/surg bed for further monitoring, evaluation, and treatment.    I personally reviewed the laboratory results with the Patient and answered all related questions prior to admission.     Impression & Plan     Covid-19  Riley Lagos was evaluated during a global COVID-19 pandemic, which necessitated consideration that the patient might be at risk for infection with the SARS-CoV-2 virus that causes COVID-19.   Applicable protocols for evaluation were followed during the patient's care.    Medical Decision Making:  Riley Lagos is a 50 year old male who presents with chief complaint of fever for 11 days.  Patient downplayed any URI symptoms, and mostly complained of fever and body aches.  Broad differential considered looking for source of fever.  He does have abnormal lung sounds and x-ray does suggest pneumonia, though on auscultation, I was expecting a left-sided pneumonia, but it appears more apparent on imaging on the right.  I do note on the CT scan, that there does appear to be infiltrates in the left lung as well.  I will cover him for community-acquired pneumonia, but I am highly suspicious for COVID-19 infection despite patient's  previous negative test.  In course of work-up, patient had a urinalysis as well which was notable for greater than 182 RBCs.  CT was obtained for this reason which identified an obstructing left ureteral stone that is 14 mm in maximum dimension.  Surprisingly, patient appears to be asymptomatic from this.  No signs of infection.  At times, patient was noted to have oxygen saturations in the low 90s on room air.  When he ambulated, this did not persist.  I discussion with patient and his wife via phone concerning disposition, and patient at this time prefers admission which is certainly reasonable given an oxygen saturation of 90% on room air and concern for COVID-19 and potential for rapid decompensation.  Patient is in agreement with this plan.  He is admitted in stable condition.    Diagnosis:    ICD-10-CM   1. Community acquired pneumonia of right lung, unspecified part of lung  J18.9   2. Kidney stone on left side  N20.0       Disposition:  Admitted to Medrano, RENNY.    Scribe Disclosure:  David PHAM, am serving as a scribe at 6:24 PM on 4/1/2020 to document services personally performed by Charlie Manuel MD based on my observations and the provider's statements to me.     [facility] EMERGENCY DEPARTMENT     Charlie Manuel MD  04/02/20 8290

## 2020-04-01 NOTE — ED TRIAGE NOTES
A&O x4, ABCs intact. Pt presents with fever, cough, SOB and fatigue for the past 11 days. Pt states that that he's alternating 800mg ibuprofen and 1000mg tylenol every 3 hours. Pt states fever was 103. Pt has been exposed to someone with known COVID-19.

## 2020-04-02 LAB
ANION GAP SERPL CALCULATED.3IONS-SCNC: 7 MMOL/L (ref 3–14)
BASOPHILS # BLD AUTO: 0 10E9/L (ref 0–0.2)
BASOPHILS NFR BLD AUTO: 0.2 %
BUN SERPL-MCNC: 17 MG/DL (ref 7–30)
CALCIUM SERPL-MCNC: 8.2 MG/DL (ref 8.5–10.1)
CHLORIDE SERPL-SCNC: 107 MMOL/L (ref 94–109)
CO2 SERPL-SCNC: 23 MMOL/L (ref 20–32)
CREAT SERPL-MCNC: 0.69 MG/DL (ref 0.66–1.25)
DIFFERENTIAL METHOD BLD: ABNORMAL
EOSINOPHIL # BLD AUTO: 0 10E9/L (ref 0–0.7)
EOSINOPHIL NFR BLD AUTO: 0.2 %
ERYTHROCYTE [DISTWIDTH] IN BLOOD BY AUTOMATED COUNT: 13 % (ref 10–15)
GFR SERPL CREATININE-BSD FRML MDRD: >90 ML/MIN/{1.73_M2}
GLUCOSE SERPL-MCNC: 105 MG/DL (ref 70–99)
HCT VFR BLD AUTO: 43 % (ref 40–53)
HGB BLD-MCNC: 13.8 G/DL (ref 13.3–17.7)
IMM GRANULOCYTES # BLD: 0.1 10E9/L (ref 0–0.4)
IMM GRANULOCYTES NFR BLD: 1.4 %
LYMPHOCYTES # BLD AUTO: 0.7 10E9/L (ref 0.8–5.3)
LYMPHOCYTES NFR BLD AUTO: 10.7 %
MCH RBC QN AUTO: 27.3 PG (ref 26.5–33)
MCHC RBC AUTO-ENTMCNC: 32.1 G/DL (ref 31.5–36.5)
MCV RBC AUTO: 85 FL (ref 78–100)
MONOCYTES # BLD AUTO: 0.3 10E9/L (ref 0–1.3)
MONOCYTES NFR BLD AUTO: 5.2 %
NEUTROPHILS # BLD AUTO: 5.2 10E9/L (ref 1.6–8.3)
NEUTROPHILS NFR BLD AUTO: 82.3 %
NRBC # BLD AUTO: 0 10*3/UL
NRBC BLD AUTO-RTO: 0 /100
PLATELET # BLD AUTO: 192 10E9/L (ref 150–450)
POTASSIUM SERPL-SCNC: 3.6 MMOL/L (ref 3.4–5.3)
PROCALCITONIN SERPL-MCNC: <0.05 NG/ML
RBC # BLD AUTO: 5.06 10E12/L (ref 4.4–5.9)
SARS-COV-2 RNA SPEC QL NAA+PROBE: ABNORMAL
SODIUM SERPL-SCNC: 137 MMOL/L (ref 133–144)
SPECIMEN SOURCE: ABNORMAL
WBC # BLD AUTO: 6.4 10E9/L (ref 4–11)

## 2020-04-02 PROCEDURE — 12000000 ZZH R&B MED SURG/OB

## 2020-04-02 PROCEDURE — 25000132 ZZH RX MED GY IP 250 OP 250 PS 637: Performed by: PHYSICIAN ASSISTANT

## 2020-04-02 PROCEDURE — 80048 BASIC METABOLIC PNL TOTAL CA: CPT | Performed by: PHYSICIAN ASSISTANT

## 2020-04-02 PROCEDURE — 99233 SBSQ HOSP IP/OBS HIGH 50: CPT | Performed by: INTERNAL MEDICINE

## 2020-04-02 PROCEDURE — 25800030 ZZH RX IP 258 OP 636: Performed by: PHYSICIAN ASSISTANT

## 2020-04-02 PROCEDURE — 85025 COMPLETE CBC W/AUTO DIFF WBC: CPT | Performed by: PHYSICIAN ASSISTANT

## 2020-04-02 PROCEDURE — 25000128 H RX IP 250 OP 636: Performed by: PHYSICIAN ASSISTANT

## 2020-04-02 PROCEDURE — 99207 ZZC APP CREDIT; MD BILLING SHARED VISIT: CPT | Performed by: INTERNAL MEDICINE

## 2020-04-02 RX ADMIN — ACETAMINOPHEN 650 MG: 325 TABLET, FILM COATED ORAL at 20:36

## 2020-04-02 RX ADMIN — AZITHROMYCIN MONOHYDRATE 250 MG: 500 INJECTION, POWDER, LYOPHILIZED, FOR SOLUTION INTRAVENOUS at 20:17

## 2020-04-02 RX ADMIN — ACETAMINOPHEN 650 MG: 325 TABLET, FILM COATED ORAL at 10:28

## 2020-04-02 RX ADMIN — ACETAMINOPHEN 650 MG: 325 TABLET, FILM COATED ORAL at 00:18

## 2020-04-02 RX ADMIN — CEFTRIAXONE 2 G: 2 INJECTION, POWDER, FOR SOLUTION INTRAMUSCULAR; INTRAVENOUS at 21:35

## 2020-04-02 RX ADMIN — SODIUM CHLORIDE: 9 INJECTION, SOLUTION INTRAVENOUS at 00:17

## 2020-04-02 RX ADMIN — ACETAMINOPHEN 650 MG: 325 TABLET, FILM COATED ORAL at 06:17

## 2020-04-02 RX ADMIN — ACETAMINOPHEN 650 MG: 325 TABLET, FILM COATED ORAL at 16:35

## 2020-04-02 ASSESSMENT — ENCOUNTER SYMPTOMS: MYALGIAS: 1

## 2020-04-02 ASSESSMENT — ACTIVITIES OF DAILY LIVING (ADL)
BATHING: 0-->INDEPENDENT
SWALLOWING: 0-->SWALLOWS FOODS/LIQUIDS WITHOUT DIFFICULTY
DRESS: 0-->INDEPENDENT
FALL_HISTORY_WITHIN_LAST_SIX_MONTHS: NO
RETIRED_COMMUNICATION: 0-->UNDERSTANDS/COMMUNICATES WITHOUT DIFFICULTY
RETIRED_EATING: 0-->INDEPENDENT
COGNITION: 0 - NO COGNITION ISSUES REPORTED
TRANSFERRING: 0-->INDEPENDENT
AMBULATION: 0-->INDEPENDENT
ADLS_ACUITY_SCORE: 10
TOILETING: 0-->INDEPENDENT
ADLS_ACUITY_SCORE: 10
ADLS_ACUITY_SCORE: 10

## 2020-04-02 ASSESSMENT — MIFFLIN-ST. JEOR: SCORE: 2047.85

## 2020-04-02 NOTE — PLAN OF CARE
End of Shift Summary  For vital signs and complete assessments, please see documentation flowsheets.     Pertinent assessments: Tmax of 99.4, given Tylenol once for comfort. BLANKENSHIP but improving. Doing IS independently. Poor appetite.  Major Shift Events: None  Treatment Plan: Zithromax, Rocephin, & IS.    Discharge Readiness: Medically active  Expected Discharge Date: TBD  Discharge Disposition: Home with Self care  Barriers/Criteria for discharge: Awaiting COVID results.

## 2020-04-02 NOTE — PHARMACY-ADMISSION MEDICATION HISTORY
Admission medication history interview status for this patient is complete. See [facility] admission navigator for allergy information, prior to admission medications and immunization status.     Medication history interview source(s):Patient  Medication history resources (including written lists, pill bottles, clinic record):None  [facility]    Changes made to PTA medication list:  Added: Multivitamins  Deleted: Nitroglycerin patch  Changed: none    Actions taken by pharmacist (provider contacted, etc):None     Additional medication history information:None    Medication reconciliation/reorder completed by provider prior to medication history?  Yes     For patients on insulin therapy: No    Prior to Admission medications    Medication Sig Last Dose Taking? Auth Provider   fluticasone (FLONASE) 50 MCG/ACT nasal spray Spray 1 spray into both nostrils daily  4/1/2020 at Unknown time Yes Reported, Patient   Loratadine (CLARITIN) 10 MG capsule Take 10 mg by mouth daily. 3/31/2020 at Unknown time Yes Reported, Patient   Multiple Vitamins-Minerals (MULTIVITAL) TABS Take 1 tablet by mouth daily 3/31/2020 at Unknown time Yes Unknown, Entered By History

## 2020-04-02 NOTE — CONSULTS
Urology Consult History and Physical    Name: Riley Lagos    MRN: [id number]   YOB: 1970       We were asked to see Riley Lagos at the request of Dr. Salazar for evaluation and treatment of ureteral stone.        Chief Complaint:   Pneumonia          History of Present Illness:   Riley Lagos is a 50 year old male who is being seen for evaluation of left ureteral stone. He is currently admitted with suspected COVID 19 infection. Had 10 days of fever, shortness of breath, fatigue, and myalgias. CXR with likely pneumonia and COVID testing sent.    Noted incidentally to have an asymptomatic left proximal ureteral stone with mild hydronephrosis. Per report, he has no symptoms from this. He is being treated with ceftriaxone and azithromycin for pneumonia. No gross hematuria, flank pain, or abdominal pain, per report. Per discussion with nurse, patient has had kidney stones before and does not have current kidney stone symptoms. UA without evidence of infection.           Past Medical History:     Past Medical History:   Diagnosis Date     Allergic rhinitis      Kidney stone           Past Surgical History:     Past Surgical History:   Procedure Laterality Date     APPENDECTOMY OPEN       EYE SURGERY      lasix     KNEE SURGERY      left knee          Social History:     Social History     Tobacco Use     Smoking status: Never Smoker     Smokeless tobacco: Never Used   Substance Use Topics     Alcohol use: Yes     Alcohol/week: 3.3 standard drinks     Types: 4 Standard drinks or equivalent per week          Family History:     Family History   Problem Relation Age of Onset     Prostate Cancer Father         age 65     Diabetes Mother      Heart Failure Paternal Grandmother      Colon Cancer No family hx of           Allergies:     Allergies   Allergen Reactions     Cats      Red itchy eyes      Dogs      Red itchy eyes      Dust Mites      Runny nose and dizzy          Medications:  "    Current Facility-Administered Medications   Medication     acetaminophen (TYLENOL) tablet 650 mg     azithromycin (ZITHROMAX) 250 mg in sodium chloride 0.9 % 250 mL intermittent infusion     cefTRIAXone (ROCEPHIN) 2 g vial to attach to  ml bag for ADULTS or NS 50 ml bag for PEDS     lidocaine (LMX4) cream     lidocaine 1 % 0.1-1 mL     melatonin tablet 1 mg     naloxone (NARCAN) injection 0.1-0.4 mg     ondansetron (ZOFRAN-ODT) ODT tab 4 mg    Or     ondansetron (ZOFRAN) injection 4 mg     senna-docusate (SENOKOT-S/PERICOLACE) 8.6-50 MG per tablet 1 tablet    Or     senna-docusate (SENOKOT-S/PERICOLACE) 8.6-50 MG per tablet 2 tablet     sodium chloride (PF) 0.9% PF flush 3 mL     sodium chloride (PF) 0.9% PF flush 3 mL             Review of Systems:    ROS: 10 point ROS neg other than the symptoms noted above in the HPI.          Physical Exam:     Patient Vitals for the past 24 hrs:   BP Temp Temp src Pulse Heart Rate Resp SpO2 Height Weight   04/02/20 1635 123/77 101  F (38.3  C) Oral -- 79 16 95 % -- --   04/02/20 1027 -- 99.3  F (37.4  C) Oral -- -- -- -- -- --   04/02/20 0834 116/64 99.4  F (37.4  C) Oral -- 72 16 95 % -- --   04/02/20 0611 -- 99.9  F (37.7  C) Oral -- 80 18 93 % -- --   04/02/20 0400 -- -- -- -- 74 -- 92 % -- --   04/02/20 0236 -- -- -- -- 70 -- 93 % -- --   04/02/20 0139 -- 99.3  F (37.4  C) Oral -- 72 -- 94 % -- --   04/02/20 0024 -- -- -- -- -- -- -- 1.93 m (6' 4\") 109.1 kg (240 lb 9.6 oz)   04/02/20 0010 131/71 100.3  F (37.9  C) Oral -- 79 16 96 % -- --   04/01/20 2336 -- -- -- -- -- 16 -- -- --   04/01/20 2330 -- -- -- -- -- -- 97 % -- --   04/01/20 2300 -- -- -- -- -- -- 95 % -- --   04/01/20 2230 -- -- -- 75 -- -- 94 % -- --   04/01/20 2200 -- -- -- 74 -- -- 97 % -- --   04/01/20 2130 -- -- -- 77 -- -- 96 % -- --   04/01/20 2115 -- -- -- -- -- -- 96 % -- --   04/01/20 2100 129/81 -- -- -- -- -- -- -- --   04/01/20 2030 126/71 -- -- 75 -- -- 92 % -- --   04/01/20 2005 -- -- -- " -- -- -- 93 % -- --   04/01/20 2000 124/77 -- -- 76 -- -- 90 % -- --   04/01/20 1930 120/80 -- -- -- -- -- -- -- --   04/01/20 1813 (!) 146/98 99.2  F (37.3  C) Oral -- 93 18 96 % -- --     Patient not examined as he is noted to be asymptomatic from this stone, which is confirmed in discussion with nursing. Given COVID-19 status is of concern, was not evaluated in person by me today.          Data:   All laboratory data reviewed:    Recent Labs   Lab 04/02/20  0610 04/01/20 1927   WBC 6.4 6.1   HGB 13.8 14.7    199     Recent Labs   Lab 04/02/20 0610 04/01/20 1927    137   POTASSIUM 3.6 3.9   CHLORIDE 107 106   CO2 23 24   BUN 17 17   CR 0.69 0.82   * 100*   MINAL 8.2* 8.5     Recent Labs   Lab 04/01/20 1927   COLOR Yellow   APPEARANCE Clear   URINEGLC Negative   URINEBILI Negative   URINEKETONE 60*   SG 1.028   URINEPH 6.0   PROTEIN 100*   NITRITE Negative   LEUKEST Negative   RBCU >182*   WBCU 2       All pertinent imaging reviewed:    CT abd/pelvis 4/1/2020  IMPRESSION:   1.  14 mm obstructing calculus in the proximal left ureter, with mild left hydronephrosis.  2.  Right lower lobe pneumonia.         Impression and Plan:   Impression:   50 year old male with pneumonia and suspected COVID 19, found incidentally to have asymptomatic left proximal ureteral stone. This stone is approximately 9 mm in diameter. Minimal hydronephrosis noted. Patient is asymptomatic which is confirmed upon discussion with nursing. Given uncertain COVID 19 status and no symptoms from stone, he was not directly evaluated in person today. His UA does not suggest urinary infection, he has minimal hydronephrosis, and no leukocytosis. No clear indication for urgent urologic intervention.    Presuming he remains asymptomatic from his stone, should have outpatient evaluation once he has recovered from his current acute illness. He would be candidate for outpatient ureteroscopy. Should he become symptomatic from his stone,  or develop evidence of urosepsis, please contact urology for additional evaluation.      Plan:   Plan outpatient urology evaluation for elective treatment of his asymptomatic ureteral stone upon resolution of acute illness.  Please contact us if he becomes symptomatic or requires more urgent inpatient urologic evaluation.     Aamir Caceres MD  Urology  [facility]  Clinic Phone [phone]

## 2020-04-02 NOTE — CONSULTS
"CLINICAL NUTRITION SERVICES  -  ASSESSMENT NOTE      MALNUTRITION:  % Weight Loss:  None noted  % Intake:  </= 50% for >/= 5 days (severe malnutrition)  Subcutaneous Fat Loss:  Unable to determine, deferred   Muscle Loss: Unable to determine, deferred   Fluid Retention:  None documented    Malnutrition Diagnosis: Unable to determine due to lack of physical exam per direction of department guidelines in the setting of COVID19          REASON FOR ASSESSMENT  Riley Lagos is a 50 year old male seen by Registered Dietitian for Admission Nutrition Risk Screen for reduced oral intake over the last month.    PMH of: Kidney stones.    Admit 2/2: SOB, myalgia, fever, fatigue, COVID-19 r/o.      NUTRITION HISTORY  - Information obtained from patient via phone per direction of department guidelines in the setting of COVID19.  - Diet at home: Regular.  - Usual intakes: Meals TID + snacks.  - Barriers to PO intakes: Fever, decreased appetite, overall not feeling well for \"10-11 days\" PTA.  Feels he was eating <50% of usual during this time.  - Use of oral supplements: None.  - Chewing/swallowing issues: Denied.  - Allergies: NKFA.      CURRENT NUTRITION ORDERS  Diet Order:     Regular    Current Intake/Tolerance:  25% meal consumption for 1 meal recording since admit based on available flowsheet review.  Ongoing decreased appetite verbalized.      NUTRITION FOCUSED PHYSICAL ASSESSMENT FOR DIAGNOSING MALNUTRITION)  No:  per direction of department guidelines in the setting of COVID19            Observed:    Deferred, d/t above    Obtained from Chart/Interdisciplinary Team:  No documentation of skin issues   Stooling patterns reviewed, no recorded BM since admit    ANTHROPOMETRICS  Height: 6' 4\"  Weight: 240 lbs 9.6 oz  Body mass index is 29.29 kg/m .  Weight Status:  Overweight BMI 25-29.9  Weight History:  Wt Readings from Last 10 Encounters:   04/02/20 109.1 kg (240 lb 9.6 oz)   04/10/18 108.9 kg (240 lb)   06/08/17 107 kg " "(236 lb)   05/30/17 108.9 kg (240 lb)   05/16/17 108.9 kg (240 lb)   05/04/17 112 kg (247 lb)   06/29/15 104.3 kg (230 lb)   01/19/12 112.9 kg (249 lb)   - No recent wt trending available per above or care everywhere review.  - 240# UBW reported by patient himself.  Feels he may have \"lost a few pounds\" PTA.    LABS  Labs reviewed:  Electrolytes  Potassium (mmol/L)   Date Value   04/02/2020 3.6   04/01/2020 3.9   05/09/2017 4.3    Blood Glucose  Glucose (mg/dL)   Date Value   04/02/2020 105 (H)   04/01/2020 100 (H)   05/09/2017 107 (H)   01/10/2012 112 (H)   01/27/2005 98    Inflammatory Markers  CRP Inflammation (mg/L)   Date Value   04/01/2020 108.0 (H)     WBC   Date Value   04/02/2020 6.4 10e9/L   04/01/2020 6.1 10e9/L   01/10/2012 4.3 K/uL (A)     Albumin (g/dL)   Date Value   04/01/2020 3.3 (L)   05/09/2017 4.1   01/10/2012 4.7      Sodium (mmol/L)   Date Value   04/02/2020 137   04/01/2020 137   05/09/2017 140    Renal  Urea Nitrogen (mg/dL)   Date Value   04/02/2020 17   04/01/2020 17   05/09/2017 21     Creatinine (mg/dL)   Date Value   04/02/2020 0.69   04/01/2020 0.82   05/09/2017 1.13     Additional  Triglycerides (mg/dL)   Date Value   05/09/2017 71   01/10/2012 78     Ketones Urine (mg/dL)   Date Value   04/01/2020 60 (A)        B/P: 116/64, T: 99.3, P: 75, R: 16      MEDICATIONS  Medications reviewed:    azithromycin  250 mg Intravenous Q24H     cefTRIAXone  2 g Intravenous Q24H     senna-docusate  1 tablet Oral BID    Or     senna-docusate  2 tablet Oral BID     sodium chloride (PF)  3 mL Intracatheter Q8H             ASSESSED NUTRITION NEEDS PER APPROVED PRACTICE GUIDELINES:    Dosing Weight 109 kg  Estimated Energy Needs: 7590-2199 kcals (20-25 Kcal/Kg)  Justification: maintenance with acute illness  Estimated Protein Needs: 109-131 grams protein (1-1.2 g pro/Kg)  Justification: preservation of lean body mass with acute illness  Estimated Fluid Needs: per MD      NUTRITION DIAGNOSIS:  Inadequate " oral intake related to decreased appetite in the setting of fevers, SOB, acute illness as evidenced by meeting <50% usual intakes over the past >1 week per report.    NUTRITION INTERVENTIONS  Recommendations / Nutrition Prescription  Continue regular diet with protein focus as able.  Variety and small/frequent meals as needed to combat decreased appetite.    Ok for prn supplements. Discussed those available and how to order.      Implementation  Nutrition education: Provided education on above with patient.    Medical Food Supplement: As above.     Collaboration and Referral of Nutrition care: Discussed POC with team during rounds.      Nutrition Goals  Patient to consume at least 50-75% of meals or supplements TID to show improvement in PO intakes.      MONITORING AND EVALUATION:  Progress towards goals will be monitored and evaluated per protocol and Practice Guidelines          Aurora Bradley RDN, LD  Clinical Dietitian  3rd floor/ICU: [phone]  All other floors: [phone]  Weekend/holiday: [phone]

## 2020-04-02 NOTE — PROGRESS NOTES
[facility]    Hospitalist Progress Note  Name: Riley Lagos    MRN: [id number]  Provider: Cara Salazar MD  Date of Service: 04/02/2020    Assessment & Plan   Summary of Stay: Riley Lagos is a 50 year old male who was admitted on 4/1/2020 for  suspected CO VID 19 infection.    Patient with history of IL IV for 10 days with cough fever shortness of breath fatigue and myalgias.  He was initially tested negative on 3/23/2020.  He does have known sick contact with COPD 19 patient from work.  Presented to the emergency room again with 103 temperature myalgias and weakness.  Chest x-ray shows right lower lobe infiltrate more than left.  Repeat CO VID testing sent results pending.      Suspect CO VID 19 infection  -Repeat test pending  -Empirically started on treatment for pneumonia with IV ceftriaxone and azithromycin  -Supplemental O2 as needed  -Contact and droplet precaution  -Cautious IV fluids  -Symptomatic treatment      Nephrolithiasis  -14 mm proximal ureteral stone with mild hydronephrosis  -Asymptomatic  -urology consulted      DVT Prophylaxis: Pneumatic Compression Devices  Code Status: Full Code    Disposition: Expected discharge pending clinical improvement and CO VID resolved    Interval History   Assumed care reviewed chart.  Patient is a myalgia fevers appeared sad denies trouble breathing.  Awaiting test results concerned about kidney stone review of all the other symptoms are negative    -Data reviewed today: I reviewed all new labs and imaging reports over the last 24 hours. I personally reviewed no images or EKG's today.    Physical Exam   Temp: 99.9  F (37.7  C) Temp src: Oral BP: 131/71 Pulse: 75 Heart Rate: 80 Resp: 18 SpO2: 93 % O2 Device: None (Room air)    Vitals:    04/02/20 0024   Weight: 109.1 kg (240 lb 9.6 oz)     Vital Signs with Ranges  Temp:  [99.2  F (37.3  C)-100.3  F (37.9  C)] 99.9  F (37.7  C)  Pulse:  [74-77] 75  Heart Rate:  [70-93] 80  Resp:  [16-18]  18  BP: (120-146)/(71-98) 131/71  SpO2:  [90 %-97 %] 93 %  No intake/output data recorded.      GENERAL:  Ill appearing but non  Toxic, no respiratory compromise or distress  PSYCH: pleasant, oriented, No acute distress.  HEENT:  PERRLA. Normal conjunctiva, normal hearing, nasal mucosa and Oropharynx are normal.  NECK:  Supple, no neck vein distention, adenopathy or bruits, normal thyroid.  HEART:  Normal S1, S2 with no murmur, no pericardial rub, gallops or S3 or S4.  LUNGS:  Crackles at right lung field ML and LL, none on LLL, normal Respiratory effort. No wheezing, or ronchi.  ABDOMEN:  Soft, no hepatosplenomegaly, normal bowel sounds. Non-tender, non distended.   EXTREMITIES:  No pedal edema, +2 pulses bilateral and equal.  SKIN:  Dry to touch, No rash, wound or ulcerations.  NEUROLOGIC:  CN 2-12 intact, BL 5/5 symmetric upper and lower extremity strength, sensation is intact with no focal deficits.     Medications     sodium chloride 75 mL/hr at 04/02/20 0017       azithromycin  250 mg Intravenous Q24H     cefTRIAXone  2 g Intravenous Q24H     senna-docusate  1 tablet Oral BID    Or     senna-docusate  2 tablet Oral BID     sodium chloride (PF)  3 mL Intracatheter Q8H     Data     No results for input(s): PH, PHV, PO2, PO2V, SAT, PCO2, PCO2V, HCO3, HCO3V in the last 168 hours.  Recent Labs   Lab 04/02/20 0610 04/01/20 1927   WBC 6.4 6.1   HGB 13.8 14.7   HCT 43.0 45.3   MCV 85 85    199     Recent Labs   Lab 04/02/20 0610 04/01/20 1927    137   POTASSIUM 3.6 3.9   CHLORIDE 107 106   CO2 23 24   ANIONGAP 7 7   * 100*   BUN 17 17   CR 0.69 0.82   GFRESTIMATED >90 >90   GFRESTBLACK >90 >90   MINAL 8.2* 8.5     Recent Labs   Lab 04/01/20 1927   CULT No growth after 8 hours  No growth after 8 hours     No results for input(s): NTBNPI, NTBNP in the last 168 hours.  Recent Labs   Lab 04/02/20 0610 04/01/20 1927    137   POTASSIUM 3.6 3.9   CHLORIDE 107 106   CO2 23 24   * 100*      Recent Labs   Lab 04/01/20 1927   AST 29   ALT 78*   ALKPHOS 113   BILITOTAL 0.7     No results for input(s): INR in the last 168 hours.  Recent Labs   Lab 04/01/20 1927   LACT 0.8     No results for input(s): LIPASE in the last 168 hours.  No results for input(s): TROPONIN, TROPI, TROPR in the last 168 hours.    Invalid input(s): TROP, TROPONINIES  Recent Labs   Lab 04/01/20 1927   COLOR Yellow   APPEARANCE Clear   URINEGLC Negative   URINEBILI Negative   URINEKETONE 60*   SG 1.028   UBLD Moderate*   URINEPH 6.0   PROTEIN 100*   NITRITE Negative   LEUKEST Negative   RBCU >182*   WBCU 2       Recent Results (from the past 24 hour(s))   XR Chest Port 1 View    Narrative    EXAM: XR CHEST PORT 1 VW  LOCATION: [facility]  DATE/TIME: 4/1/2020 7:34 PM    INDICATION: Fever  COMPARISON: None.      Impression    IMPRESSION: Patchy infiltrate in the right upper lobe and also likely in the right middle lobe consistent with pneumonia. No pleural effusion.   Abd/pelvis CT,  IV  contrast only TRAUMA / AAA    Narrative    EXAM: CT ABDOMEN PELVIS W CONTRAST  LOCATION: [facility]  DATE/TIME: 4/1/2020 8:54 PM    INDICATION: Hematuria and fever  COMPARISON: 02/07/2005  TECHNIQUE: CT scan of the abdomen and pelvis was performed following injection of IV contrast. Multiplanar reformats were obtained. Dose reduction techniques were used.  CONTRAST: 100mL Isovue-370    FINDINGS:   LOWER CHEST: Patchy infiltrates in the right lower lobe consistent with pneumonia.    HEPATOBILIARY: Normal.    PANCREAS: Normal.    SPLEEN: Clinic granuloma.    ADRENAL GLANDS: Normal.    KIDNEYS/BLADDER: Mild left hydronephrosis. Obstructing calculus in the proximal left ureter measures 10 x 8 x 14 mm.    BOWEL: Normal.    LYMPH NODES: Normal.    VASCULATURE: Unremarkable.    PELVIC ORGANS: Normal.    MUSCULOSKELETAL: Normal.      Impression    IMPRESSION:   1.  14 mm obstructing calculus in the proximal left ureter, with  mild left hydronephrosis.  2.  Right lower lobe pneumonia.

## 2020-04-02 NOTE — ED NOTES
[facility]  ED Nurse Handoff Report    Riley Lagos is a 50 year old male   ED Chief complaint: Fever  . ED Diagnosis:   Final diagnoses:   Community acquired pneumonia of right lung, unspecified part of lung   Kidney stone on left side     Allergies:   Allergies   Allergen Reactions     Cats      Red itchy eyes      Dogs      Red itchy eyes      Dust Mites      Runny nose and dizzy       Code Status: Full Code  Activity level - Baseline/Home:  Independent. Activity Level - Current:   Independent. Lift room needed: No. Bariatric: No   Needed: No   Isolation: Yes. Infection: COVID19 rule out    Vital Signs:   Vitals:    04/01/20 2005 04/01/20 2030 04/01/20 2100 04/01/20 2115   BP:  126/71 129/81    Pulse:  75     Resp:       Temp:       TempSrc:       SpO2: 93% 92%  96%       Cardiac Rhythm:  ,      Pain level:    Patient confused: No. Patient Falls Risk: Yes.   Elimination Status: Has voided   Patient Report - Initial Complaint: Fever. Focused Assessment: Pt presents with 11d of increased fatigue and fevers up to 103F despite alternating Tylenol & Ibuprofen regularly. Pt also endorses SOB worse on exertion. Known exposure to person infected with COVID19. Pt was tested with a negative result on 3/23. Denies pain. ABCs intact A&Ox4.  Tests Performed:   Labs Ordered and Resulted from Time of ED Arrival Up to the Time of Departure from the ED   CBC WITH PLATELETS DIFFERENTIAL - Abnormal; Notable for the following components:       Result Value    Absolute Lymphocytes 0.7 (*)     All other components within normal limits   COMPREHENSIVE METABOLIC PANEL - Abnormal; Notable for the following components:    Glucose 100 (*)     Albumin 3.3 (*)     ALT 78 (*)     All other components within normal limits   ROUTINE UA WITH MICROSCOPIC REFLEX TO CULTURE - Abnormal; Notable for the following components:    Ketones Urine 60 (*)     Blood Urine Moderate (*)     Protein Albumin Urine 100 (*)     RBC  Urine >182 (*)     Mucous Urine Present (*)     All other components within normal limits   CRP INFLAMMATION - Abnormal; Notable for the following components:    CRP Inflammation 108.0 (*)     All other components within normal limits   LACTIC ACID WHOLE BLOOD   CK TOTAL   PROCALCITONIN   COVID-19 VIRUS (CORONAVIRUS) BY PCR   PERIPHERAL IV CATHETER   BLOOD CULTURE   BLOOD CULTURE     Abd/pelvis CT,  IV  contrast only TRAUMA / AAA   Final Result   IMPRESSION:    1.  14 mm obstructing calculus in the proximal left ureter, with mild left hydronephrosis.   2.  Right lower lobe pneumonia.         XR Chest Port 1 View   Final Result   IMPRESSION: Patchy infiltrate in the right upper lobe and also likely in the right middle lobe consistent with pneumonia. No pleural effusion.        Treatments provided: Antibiotics.  Family Comments: NA  OBS brochure/video discussed/provided to patient:  Yes  ED Medications:   Medications   cefTRIAXone (ROCEPHIN) 2 g vial to attach to  ml bag for ADULTS or NS 50 ml bag for PEDS (2 g Intravenous New Bag 4/1/20 2144)   CT Scan Flush (65 mLs Intravenous Given 4/1/20 2101)   iopamidol (ISOVUE-370) solution 200 mL (100 mLs Intravenous Given 4/1/20 2059)   azithromycin (ZITHROMAX) tablet 500 mg (500 mg Oral Given 4/1/20 2143)     Drips infusing:  No  For the majority of the shift, the patient's behavior Green. Interventions performed were none.    Sepsis treatment initiated: No       ED Nurse Name/Phone Number: Shanti Damon RN,   10:05 PM    RECEIVING UNIT ED HANDOFF REVIEW    Above ED Nurse Handoff Report was reviewed: Yes  Reviewed by: Stacey Byers RN on April 1, 2020 at 11:34 PM

## 2020-04-02 NOTE — PROGRESS NOTES
Physician Attestation   I, Michael Land, have reviewed and discussed with the advanced practice provider their history, physical and plan for Riley ACUNA [place]. I did not participate in a shared visit by interviewing or examining the patient and this should be billed as an advanced practice provider only visit.    Patient is a 50-year-old male with history of nephrolithiasis who has been feeling ill for the past 10 days or so with cough, fever, shortness of breath, fatigue, and myalgias.  He was tested for COVID19 on 3/23/2020 and this was negative.  He does have a sick contact with COVID19 from work.  Having fevers to 103  F.  Has been taking acetaminophen and ibuprofen for symptoms.    A/P:  Suspect COVID19 infection: Symptomatic care with acetaminophen for fevers and pain.  Cautious IV fluids.  Supplemental oxygen as needed.  Contact and droplet precautions.  Does have significant right-sided infiltrate, less on the left and given duration of symptoms initially managed with IV ceftriaxone and azithromycin.    Nephrolithiasis: Left-sided 14 mm proximal ureteral stone with mild hydronephrosis.  Asymptomatic from this.  Urinalysis shows hematuria, but no significant pyuria.  Doubt the source of infection.  Recommend discussing with urologist tomorrow over the phone what follow-up may be needed.      Michael Land  Date of Service (when I saw the patient): I did not personally see this patient today.

## 2020-04-02 NOTE — ED NOTES
PT ambulated around room 11 8-10 times. Pt expressed shortness of breath that did not change from sitting to standing. SpO2 remained between 96-97% throughout duration of standing and sitting. Pt ambulates without trouble. Trial Passed.

## 2020-04-02 NOTE — H&P
History and Physical     Riley Lagos MRN# [id number]   YOB: 1970 Age: 50 year old      Date of Admission:  4/1/2020    Primary care provider: Sue Barnes          Assessment and Plan:   Riley Lagos is a 50 year old male with no significant PMH significant except for allergic rhinitis, kidney stones, who has been ill for the last 11 days with waxing waning fevers (99.8-103) myalgia, fatigue, and shortness of breath. He was test for COVID-19 on 3/23 given his initial sxs and exposuire risk (he works at [employer], wife is a NP at the [employer] and had COVID positive co-worker)  but the test was negative.   His sxs persisted with his last fever today as high as 103. Work up in ED shows normal WBC, but +mild lymphocyte. Normal Cr but elevated   mild ALT elevation at 78. CXR showed more RUL and RML infiltrate but did have some evidence of left upper lobe involvement more suggestive of COVID PNA pattern. He was started on Azithromycin/Rocephin. Procalcitonin pending.   Incidentally, he had evidence of hematuria on UA but no acute infection. Subsequent CT abd/pelvis showed 14 mm left proximal ureteral stone with mild left hydro but normal Cr and completely asymptomatic. And was recommended for admission for further monitoring and treatment.     1. Suspect COVID 19 infection--clinical course, lab presentation and imaging are suggestive of COVID infection. Initial test 3/23 was negative but high index of suspicion so have re-tested.   --Clinically stable at present without respiratory compromise  --Cont azithromycin and rocephin for now, procalcitonin, blood cx pending  --Stable on RA but will have supplemental O2 as needed to keep sats >92%  --No indication for nebs or inhalers at this time (bryon COVID r/o)  --Cont supportive cares with gentle fluids, NS 75cc for 10 hrs   --Follow CBC with diff and BMP in am     2. Left 14 mm proximal ureteral stone--incidental finding, pt is  asymptomatic.  --Mild left hydroureter but normal Cr.   --UA negative for acute infection so no indication for immediate surgical intervention  --Recommend calling Urology team tomorrow to discuss but given pt is asymptomatic with normal labs and no sxs of infection, doubt intervention is needed.     Admit to inpatient status  Code: FULL code  DVT prophylaxis: encourage ambulation in room for now, add scd  Dispo: likely likely need at least 2 mn          Chief Complaint:   Fever, sob, cough       History of Present Illness:   Riley Lagos is a 50 year old male with no significant PMH significant except for allergic rhinitis, kidney stones, who has been ill for the last 11 days with waxing waning fevers (99.8-103) myalgia, fatigue, and shortness of breath. He was initially tested for COVID-19 on 3/23 given his initial sxs and him being in HC (works in [employer]) and wife is a NP at the [employer]. He also was exposed to a co-worker that was COVID positive. His test did come back negative.   However, his sxs persisted with his last fever today as high as 103. He c/o mylagia, mild SOB and overall fatigue. No CP, no n/v, or any GI sxs. No loss of taste or smell.   Due to the duration of his sxs as well as on-going fevers, he came back for further evaluation.     Work up in ED shows normal WBC, but +mild lymphocyte. Normal Cr but elevated   mild ALT elevation at 78. CXR showed more RUL and RML infiltrate but did have some evidence of left upper lobe involvement more suggestive of COVID PNA pattern. He was started on Azithromycin/Rocephin. Procalcitonin pending.     Incidentally, he had evidence of hematuria on UA but no acute infection. Subsequent CT abd/pelvis showed 14 mm left proximal ureteral stone with mild left hydro but normal Cr and completely asymptomatic. He has passed all of stones in the past but did require lithotripsy one time in the past.   And was recommended for admission for further monitoring and  treatment.            Past Medical History:     Past Medical History:   Diagnosis Date     Allergic rhinitis      Kidney stone                Past Surgical History:     Past Surgical History:   Procedure Laterality Date     APPENDECTOMY OPEN       EYE SURGERY      lasix     KNEE SURGERY      left knee               Social History:     Social History     Socioeconomic History     Marital status:      Spouse name: Not on file     Number of children: Not on file     Years of education: Not on file     Highest education level: Not on file   Occupational History     Not on file   Social Needs     Financial resource strain: Not on file     Food insecurity     Worry: Not on file     Inability: Not on file     Transportation needs     Medical: Not on file     Non-medical: Not on file   Tobacco Use     Smoking status: Never Smoker     Smokeless tobacco: Never Used   Substance and Sexual Activity     Alcohol use: Yes     Alcohol/week: 3.3 standard drinks     Types: 4 Standard drinks or equivalent per week     Drug use: Not on file     Sexual activity: Not on file   Lifestyle     Physical activity     Days per week: Not on file     Minutes per session: Not on file     Stress: Not on file   Relationships     Social connections     Talks on phone: Not on file     Gets together: Not on file     Attends Sikh service: Not on file     Active member of club or organization: Not on file     Attends meetings of clubs or organizations: Not on file     Relationship status: Not on file     Intimate partner violence     Fear of current or ex partner: Not on file     Emotionally abused: Not on file     Physically abused: Not on file     Forced sexual activity: Not on file   Other Topics Concern     Parent/sibling w/ CABG, MI or angioplasty before 65F 55M? Not Asked   Social History Narrative     Not on file               Family History:     Family History   Problem Relation Age of Onset     Prostate Cancer Father         age 65      Diabetes Mother      Heart Failure Paternal Grandmother      Colon Cancer No family hx of               Allergies:      Allergies   Allergen Reactions     Cats      Red itchy eyes      Dogs      Red itchy eyes      Dust Mites      Runny nose and dizzy               Medications:     Prior to Admission medications    Medication Sig Last Dose Taking? Auth Provider   fluticasone (FLONASE) 50 MCG/ACT nasal spray Spray 1 spray into both nostrils daily  4/1/2020 at Unknown time Yes Reported, Patient   Loratadine (CLARITIN) 10 MG capsule Take 10 mg by mouth daily. 3/31/2020 at Unknown time Yes Reported, Patient   Multiple Vitamins-Minerals (MULTIVITAL) TABS Take 1 tablet by mouth daily 3/31/2020 at Unknown time Yes Unknown, Entered By History              Review of Systems:     A Comprehensive greater than 10 system review of systems was carried out.  Pertinent positives and negatives are noted above.  Otherwise negative for contributory information.            Physical Exam:   Blood pressure 129/81, pulse 75, temperature 99.2  F (37.3  C), temperature source Oral, resp. rate 18, SpO2 94 %.  Exam:  GENERAL:  Ill appearing but non  Toxic, no respiratory compromise or distress  PSYCH: pleasant, oriented, No acute distress.  HEENT:  PERRLA. Normal conjunctiva, normal hearing, nasal mucosa and Oropharynx are normal.  NECK:  Supple, no neck vein distention, adenopathy or bruits, normal thyroid.  HEART:  Normal S1, S2 with no murmur, no pericardial rub, gallops or S3 or S4.  LUNGS:  Crackles at right lung field ML and LL, none on LLL, normal Respiratory effort. No wheezing, or ronchi.  ABDOMEN:  Soft, no hepatosplenomegaly, normal bowel sounds. Non-tender, non distended.   EXTREMITIES:  No pedal edema, +2 pulses bilateral and equal.  SKIN:  Dry to touch, No rash, wound or ulcerations.  NEUROLOGIC:  CN 2-12 intact, BL 5/5 symmetric upper and lower extremity strength, sensation is intact with no focal deficits.           Data:      Recent Labs   Lab 04/01/20 1927   WBC 6.1   HGB 14.7   HCT 45.3   MCV 85        Recent Labs   Lab 04/01/20 1927      POTASSIUM 3.9   CHLORIDE 106   CO2 24   ANIONGAP 7   *   BUN 17   CR 0.82   GFRESTIMATED >90   GFRESTBLACK >90   MINAL 8.5     Recent Labs   Lab 04/01/20 1927   CULT PENDING  PENDING     Recent Labs   Lab 04/01/20 1927   .0*     Recent Labs   Lab 04/01/20 1927   AST 29   ALT 78*   ALKPHOS 113   BILITOTAL 0.7     No results for input(s): TSH in the last 168 hours.  No results for input(s): TROPONIN, TROPI, TROPR in the last 168 hours.    Invalid input(s): TROP, TROPONINIES  Recent Labs   Lab 04/01/20 1927   COLOR Yellow   APPEARANCE Clear   URINEGLC Negative   URINEBILI Negative   URINEKETONE 60*   SG 1.028   UBLD Moderate*   URINEPH 6.0   PROTEIN 100*   NITRITE Negative   LEUKEST Negative   RBCU >182*   WBCU 2         Results for orders placed or performed during the hospital encounter of 04/01/20   XR Chest Port 1 View    Narrative    EXAM: XR CHEST PORT 1 VW  LOCATION: [facility]  DATE/TIME: 4/1/2020 7:34 PM    INDICATION: Fever  COMPARISON: None.      Impression    IMPRESSION: Patchy infiltrate in the right upper lobe and also likely in the right middle lobe consistent with pneumonia. No pleural effusion.   Abd/pelvis CT,  IV  contrast only TRAUMA / AAA    Narrative    EXAM: CT ABDOMEN PELVIS W CONTRAST  LOCATION: [facility]  DATE/TIME: 4/1/2020 8:54 PM    INDICATION: Hematuria and fever  COMPARISON: 02/07/2005  TECHNIQUE: CT scan of the abdomen and pelvis was performed following injection of IV contrast. Multiplanar reformats were obtained. Dose reduction techniques were used.  CONTRAST: 100mL Isovue-370    FINDINGS:   LOWER CHEST: Patchy infiltrates in the right lower lobe consistent with pneumonia.    HEPATOBILIARY: Normal.    PANCREAS: Normal.    SPLEEN: Clinic granuloma.    ADRENAL GLANDS: Normal.    KIDNEYS/BLADDER: Mild left  hydronephrosis. Obstructing calculus in the proximal left ureter measures 10 x 8 x 14 mm.    BOWEL: Normal.    LYMPH NODES: Normal.    VASCULATURE: Unremarkable.    PELVIC ORGANS: Normal.    MUSCULOSKELETAL: Normal.      Impression    IMPRESSION:   1.  14 mm obstructing calculus in the proximal left ureter, with mild left hydronephrosis.  2.  Right lower lobe pneumonia.             Gaye Medrano PA-C

## 2020-04-02 NOTE — PLAN OF CARE
End of Shift Summary  For vital signs and complete assessments, please see documentation flowsheets.     Pertinent assessments: Tmax 100.3. SPO2 > 90% on RA. SOB w/ talking, exertion. Unable to take very deep breaths.  Major Shift Events: admitted. prn tylenol x2 for discomfort from the elevated temp. Instructed and had pt perform IS x2- reaching 500, and 750.  Treatment Plan: IVF, zithro, rocephin.    Discharge Readiness: Medically active  Expected Discharge Date: TBD  Discharge Disposition: Home with Self care  Barriers/Criteria for discharge: COVID result

## 2020-04-03 VITALS
TEMPERATURE: 96.9 F | BODY MASS INDEX: 29.3 KG/M2 | WEIGHT: 240.6 LBS | DIASTOLIC BLOOD PRESSURE: 70 MMHG | HEART RATE: 76 BPM | SYSTOLIC BLOOD PRESSURE: 119 MMHG | OXYGEN SATURATION: 95 % | HEIGHT: 76 IN | RESPIRATION RATE: 18 BRPM

## 2020-04-03 PROCEDURE — 99238 HOSP IP/OBS DSCHRG MGMT 30/<: CPT | Performed by: INTERNAL MEDICINE

## 2020-04-03 PROCEDURE — 99207 ZZC CDG-CODE INCORRECT PER BILLING BASED ON TIME: CPT | Performed by: INTERNAL MEDICINE

## 2020-04-03 ASSESSMENT — ACTIVITIES OF DAILY LIVING (ADL)
ADLS_ACUITY_SCORE: 10

## 2020-04-03 NOTE — TELECONSULT
Urology    CT scan reviewed  I spoke with nurse on phone this AM  Patient is Covid positive  He is having no symptoms or pain from his left ureteral stone    Plan to continue to follow this patient during hospitalization, if no symptoms we will not treat his stone and will observe for now    I will tentatively plan on having a telephone visit with him in about 2 weeks

## 2020-04-03 NOTE — DISCHARGE SUMMARY
[facility]    Discharge Summary  Hospitalist    Date of Admission:  4/1/2020  Date of Discharge:  4/3/2020  Discharging Provider: Ilan Villafana MD  Date of Service (when I saw the patient): 04/03/20    Discharge Diagnoses      1. Suspect CO VID 19 infection     2. Nephrolithiasis      History of Present Illness   Riley Lagos is an 50 year old male who presented with fever, shortness of breath and myalgias.   Hospital Course   Summary of Stay: Riley Lagos is a 50 year old male who was admitted on 4/1/2020 for  suspected CO VID 19 infection. Patient with history of IL IV for 10 days with cough fever shortness of breath fatigue and myalgias.  He was initially tested negative on 3/23/2020. He does have known sick contact with COPD 19 patient from work. Presented to the emergency room again with 103 temperature myalgias and weakness. Chest x-ray shows right lower lobe infiltrate more than left. Repeat COVID testing came back positive.   -Repeat test pending. Empirically started on treatment for pneumonia with IV ceftriaxone and azithromycin. He was successfully weaned off oxygen. His fever improved.   -He was put on contact and droplet precaution. Cautious IV fluids. Symptomatic treatment  He had significant improvement of his symptoms. He wanted to go home. I discussed with Dr. Cabral from [facility] and they recommended discharge home with instruction to self quarantine and red flag signs.      Nephrolithiasis  -14 mm proximal ureteral stone with mild hydronephrosis  -Asymptomatic  -Urology consulted and recommended outpatient      Patient remained gradually improved. Fever resolved. He was successfully weaned off oxygen.     Significant Results and Procedures   Results for orders placed or performed during the hospital encounter of 04/01/20   XR Chest Port 1 View    Narrative    EXAM: XR CHEST PORT 1 VW  LOCATION: [facility]  DATE/TIME: 4/1/2020 7:34  PM    INDICATION: Fever  COMPARISON: None.      Impression    IMPRESSION: Patchy infiltrate in the right upper lobe and also likely in the right middle lobe consistent with pneumonia. No pleural effusion.   Abd/pelvis CT,  IV  contrast only TRAUMA / AAA    Narrative    EXAM: CT ABDOMEN PELVIS W CONTRAST  LOCATION: [facility]  DATE/TIME: 4/1/2020 8:54 PM    INDICATION: Hematuria and fever  COMPARISON: 02/07/2005  TECHNIQUE: CT scan of the abdomen and pelvis was performed following injection of IV contrast. Multiplanar reformats were obtained. Dose reduction techniques were used.  CONTRAST: 100mL Isovue-370    FINDINGS:   LOWER CHEST: Patchy infiltrates in the right lower lobe consistent with pneumonia.    HEPATOBILIARY: Normal.    PANCREAS: Normal.    SPLEEN: Clinic granuloma.    ADRENAL GLANDS: Normal.    KIDNEYS/BLADDER: Mild left hydronephrosis. Obstructing calculus in the proximal left ureter measures 10 x 8 x 14 mm.    BOWEL: Normal.    LYMPH NODES: Normal.    VASCULATURE: Unremarkable.    PELVIC ORGANS: Normal.    MUSCULOSKELETAL: Normal.      Impression    IMPRESSION:   1.  14 mm obstructing calculus in the proximal left ureter, with mild left hydronephrosis.  2.  Right lower lobe pneumonia.             Pending Results   None  Code Status   Full Code       Primary Care Physician   [facility]        Discharge Disposition   Discharged to home  Condition at discharge: Stable    Consultations This Hospital Stay   UROLOGY IP CONSULT    Time Spent on this Encounter   IIlan MD, MD, personally saw the patient today and spent less than or equal to 30 minutes discharging this patient.    Discharge Orders      Activity    Your activity upon discharge: activity as tolerated     Reason for your hospital stay    COVID 19 positive  Possible pneumonia     Follow-up and recommended labs and tests     Follow up with primary care provider or come back to emergency room if you have  worsening symptoms  Please self quarantine yourself at home  Use tylenol for fever  Outpatient follow up with urology for ureteral stone     Diet    Follow this diet upon discharge: Orders Placed This Encounter      Snacks/Supplements Adult: Other; Ok for any supplement as needed; With Meals      Combination Diet Regular Diet Adult     Discharge Medications   Current Discharge Medication List      START taking these medications    Details   amoxicillin-clavulanate (AUGMENTIN) 875-125 MG tablet Take 1 tablet by mouth 2 times daily for 6 days  Qty: 12 tablet, Refills: 0    Associated Diagnoses: Pneumonia due to infectious organism, unspecified laterality, unspecified part of lung         CONTINUE these medications which have NOT CHANGED    Details   fluticasone (FLONASE) 50 MCG/ACT nasal spray Spray 1 spray into both nostrils daily       Loratadine (CLARITIN) 10 MG capsule Take 10 mg by mouth daily.      Multiple Vitamins-Minerals (MULTIVITAL) TABS Take 1 tablet by mouth daily             Allergies   Allergies   Allergen Reactions     Cats      Red itchy eyes      Dogs      Red itchy eyes      Dust Mites      Runny nose and dizzy     Data   Most Recent 3 CBC's:  Recent Labs   Lab Test 04/02/20 0610 04/01/20 1927   WBC 6.4 6.1   HGB 13.8 14.7   MCV 85 85    199      Most Recent 3 BMP's:  Recent Labs   Lab Test 04/02/20  0610 04/01/20 1927 05/09/17  0805    137 140   POTASSIUM 3.6 3.9 4.3   CHLORIDE 107 106 107   CO2 23 24 23   BUN 17 17 21   CR 0.69 0.82 1.13   ANIONGAP 7 7 10   MINAL 8.2* 8.5 9.4   * 100* 107*     Most Recent 2 LFT's:  Recent Labs   Lab Test 04/01/20 1927 05/09/17  0805   AST 29 15   ALT 78* 31   ALKPHOS 113 72   BILITOTAL 0.7 0.9     Most Recent INR's and Anticoagulation Dosing History:  Anticoagulation Dose History     There is no flowsheet data to display.        Most Recent 3 Troponin's:No lab results found.  Most Recent Cholesterol Panel:  Recent Labs   Lab Test  05/09/17  0805   CHOL 184   LDL 99   HDL 71   TRIG 71     Most Recent 6 Bacteria Isolates From Any Culture (See EPIC Reports for Culture Details):  Recent Labs   Lab Test 04/01/20  1927   CULT No growth after 2 days  No growth after 2 days     Most Recent TSH, T4 and A1c Labs:No lab results found.

## 2020-04-03 NOTE — PROGRESS NOTES
Spoke with  hospitalist, patient currently afebrile, not requiring oxygen and feeling better. Requesting to go home. Given mild symptoms patient to be discharged to home, to be educated on self quarantine and red flag symptoms.     Shawn Cabral MD  [facility] Hospitalist

## 2020-04-03 NOTE — PROGRESS NOTES
Patient's coronavirus disease 19 test result came back positive and I spoke with him over the phone regarding the test results.  He seems hemodynamically stable and afebrile.  Plan is to continue supportive care, contact and droplet isolation.  Rounding hospitalist to discuss with him regarding disposition.  I discussed with him the possibility of sending him to [facility] for further care.  If patient remains off oxygen and remained stable, one  may consider getting home with instructions.

## 2020-04-03 NOTE — PLAN OF CARE
End of Shift Summary  For vital signs and complete assessments, please see documentation flowsheets.     Pertinent assessments: Afebrile. LS diminished, infrequent non productive cough. RA. Denies pain.   Major Shift Events: Uneventful   Treatment Plan: Zithromax, Rocephin, Tylenol for fever & IS.    Discharge Readiness: Transfer to [place] or discharge home  Expected Discharge Date: 4/3/20  Discharge Disposition: Home with Self care  Barriers/Criteria for discharge:

## 2020-04-03 NOTE — PLAN OF CARE
End of Shift Summary  For vital signs and complete assessments, please see documentation flowsheets.     Pertinent assessments: Tmax of 101.0, given Tylenol once, Temp down to 99.0 . LS diminished, infrequent non productive cough. Using IS independently. Poor appetite.  Major Shift Events: POSITIVE COVID -19 ,Temp 101.0  Treatment Plan: Zithromax, Rocephin, Tylenol for fever & IS.    Discharge Readiness: Medically active  Expected Discharge Date: TBD  Discharge Disposition: Home with Self care  Barriers/Criteria for discharge: Positive for COVID, Low grade fever

## 2020-04-03 NOTE — PROGRESS NOTES
Pt discharged home. Discharge instructions given & verbalized understanding. Discharge meds sent with pt.

## 2020-04-04 ENCOUNTER — TELEPHONE (OUTPATIENT)
Dept: SURGERY | Facility: CLINIC | Age: 50
End: 2020-04-04

## 2020-04-04 NOTE — TELEPHONE ENCOUNTER
Telephone Encounter    COVID positive patient with incidentally discovered large left ureteral stone discharged to home one day ago.  Call with new onset left sided flank discomfort.  Currently manageable however would like to discuss next steps.  Denies UTI symptoms.  Currently able to keep food down.  Pain managed with acetaminophen.    Discussed indications for further evaluation.  Also discussed concerns about adding ibuprofen in the setting of coronavirus infection.  While the support for these concerns does not appear to be terribly strong at this point with some advisory bodies recommending either acetaminophen or ibuprofen for COVID positive patients, it would seem prudent to stick to initially attempt pain control with acetaminophen alone.  Unfortunately patient was not discharged with oxycodone.    Will attempt to control pain at home with plan to follow up in clinic to discuss ureteroscopy once current clinical course has improved.

## 2020-04-07 LAB
BACTERIA SPEC CULT: NO GROWTH
BACTERIA SPEC CULT: NO GROWTH
Lab: NORMAL
Lab: NORMAL
SPECIMEN SOURCE: NORMAL
SPECIMEN SOURCE: NORMAL

## 2020-04-22 ENCOUNTER — VIRTUAL VISIT (OUTPATIENT)
Dept: UROLOGY | Facility: CLINIC | Age: 50
End: 2020-04-22
Payer: COMMERCIAL

## 2020-04-22 VITALS — HEIGHT: 76 IN | WEIGHT: 240 LBS | BODY MASS INDEX: 29.22 KG/M2

## 2020-04-22 DIAGNOSIS — N20.1 URETERAL STONE: Primary | ICD-10-CM

## 2020-04-22 PROCEDURE — 99201 ZZC OFFICE/OUTPT VISIT, NEW, LEVEL I: CPT | Mod: 95 | Performed by: UROLOGY

## 2020-04-22 RX ORDER — CEFAZOLIN SODIUM 2 G/50ML
2 SOLUTION INTRAVENOUS
Status: CANCELLED | OUTPATIENT
Start: 2020-04-22

## 2020-04-22 RX ORDER — CEFAZOLIN SODIUM 1 G/50ML
1 INJECTION, SOLUTION INTRAVENOUS SEE ADMIN INSTRUCTIONS
Status: CANCELLED | OUTPATIENT
Start: 2020-04-22

## 2020-04-22 ASSESSMENT — MIFFLIN-ST. JEOR: SCORE: 2045.13

## 2020-04-22 NOTE — PROGRESS NOTES
"Riley Lagos is a 50 year old male who is being evaluated via a billable telephone visit.      The patient has been notified of following:     \"This telephone visit will be conducted via a call between you and your physician/provider. We have found that certain health care needs can be provided without the need for a physical exam.  This service lets us provide the care you need with a short phone conversation.  If a prescription is necessary we can send it directly to your pharmacy.  If lab work is needed we can place an order for that and you can then stop by our lab to have the test done at a later time.    Telephone visits are billed at different rates depending on your insurance coverage. During this emergency period, for some insurers they may be billed the same as an in-person visit.  Please reach out to your insurance provider with any questions.    If during the course of the call the physician/provider feels a telephone visit is not appropriate, you will not be charged for this service.\"    Patient has given verbal consent for Telephone visit?  YES    How would you like to obtain your AVS?     This is a 50 year old gentleman who was recently admitted to the hospital on 4/1 with a fever and he tested Covid positive. He also had an incidentally discovered 14mm stone in the proximal left ureter. He has been completely asymptomatic from this stone. He has had no flank pain. He was discharged from the hospital on 4/3.    He has had prior stones. First in 1997 a stone passed. In 2005 he had a small ureteral stone removed by Dr Menezes.    Since leaving the hospital he continues to have no flank pain. In hindsight he has noted some dull groin pain intermittently for years.    His stone is too large to pass.  This will require ureteroscopy to remove the stone.  We discussed cystoscopy with left-sided ureteroscopy, holmium laser lithotripsy of stone basketing, stent placement.  He wishes to proceed.  Since he " is not having pain this is considered an elective procedure at this time, these procedures are being delayed due to coronavirus pandemic.  We will tentatively get him scheduled as an outpatient in the operating room in June for his procedure.    Phone call duration: 11:30 minutes    Andrew Chavarria M.D.  [facility] Urology

## 2020-05-28 DIAGNOSIS — Z11.59 ENCOUNTER FOR SCREENING FOR OTHER VIRAL DISEASES: Primary | ICD-10-CM

## 2020-05-28 PROBLEM — N20.1 URETERAL STONE: Status: ACTIVE | Noted: 2020-05-28

## 2020-06-16 DIAGNOSIS — Z11.59 ENCOUNTER FOR SCREENING FOR OTHER VIRAL DISEASES: ICD-10-CM

## 2020-06-16 PROCEDURE — U0003 INFECTIOUS AGENT DETECTION BY NUCLEIC ACID (DNA OR RNA); SEVERE ACUTE RESPIRATORY SYNDROME CORONAVIRUS 2 (SARS-COV-2) (CORONAVIRUS DISEASE [COVID-19]), AMPLIFIED PROBE TECHNIQUE, MAKING USE OF HIGH THROUGHPUT TECHNOLOGIES AS DESCRIBED BY CMS-2020-01-R: HCPCS | Mod: 90 | Performed by: UROLOGY

## 2020-06-16 PROCEDURE — 99000 SPECIMEN HANDLING OFFICE-LAB: CPT | Performed by: UROLOGY

## 2020-06-17 LAB
SARS-COV-2 RNA SPEC QL NAA+PROBE: NOT DETECTED
SPECIMEN SOURCE: NORMAL

## 2020-06-18 ENCOUNTER — OFFICE VISIT (OUTPATIENT)
Dept: PEDIATRICS | Facility: CLINIC | Age: 50
End: 2020-06-18
Payer: COMMERCIAL

## 2020-06-18 VITALS
SYSTOLIC BLOOD PRESSURE: 128 MMHG | BODY MASS INDEX: 30.55 KG/M2 | WEIGHT: 251 LBS | HEART RATE: 72 BPM | TEMPERATURE: 98 F | RESPIRATION RATE: 16 BRPM | OXYGEN SATURATION: 97 % | DIASTOLIC BLOOD PRESSURE: 62 MMHG

## 2020-06-18 DIAGNOSIS — N20.1 URETERAL STONE: ICD-10-CM

## 2020-06-18 DIAGNOSIS — Z01.818 PREOP GENERAL PHYSICAL EXAM: Primary | ICD-10-CM

## 2020-06-18 PROCEDURE — 99213 OFFICE O/P EST LOW 20 MIN: CPT | Performed by: INTERNAL MEDICINE

## 2020-06-18 NOTE — PROGRESS NOTES
[facility] [place]  [address]  [address]  [place] [address] [phone]  Dept: [phone]    PRE-OP EVALUATION:  Today's date: 2020    Riley Lagos (: 1970) presents for pre-operative evaluation assessment as requested by Dr. Wise.  He requires evaluation and anesthesia risk assessment prior to undergoing surgery/procedure for treatment of kidney stones .    Fax number for surgical facility:   Primary Physician: [facility]  Type of Anesthesia Anticipated: General    Patient has a Health Care Directive or Living Will:  NO    Preop Questions 2020   Who is doing your surgery? Dr. Wise   What are you having done? Kidney stone removal   Date of Surgery/Procedure: 2020   Facility or Hospital where procedure/surgery will be performed: [facility]   1.  Do you have a history of Heart attack, stroke, stent, coronary bypass surgery, or other heart surgery? No   2.  Do you ever have any pain or discomfort in your chest? No   3.  Do you have a history of  Heart Failure? No   4.   Are you troubled by shortness of breath when:  walking on a level surface, or up a slight hill, or at night? No   5.  Do you currently have a cold, bronchitis or other respiratory infection? No   6.  Do you have a cough, shortness of breath, or wheezing? No   7.  Do you sometimes get pains in the calves of your legs when you walk? No   8. Do you or anyone in your family have previous history of blood clots? No   9.  Do you or does anyone in your family have a serious bleeding problem such as prolonged bleeding following surgeries or cuts? No   10. Have you ever had problems with anemia or been told to take iron pills? No   11. Have you had any abnormal blood loss such as black, tarry or bloody stools? No   12. Have you ever had a blood transfusion? No   13. Have you or any of your relatives ever had problems with anesthesia? No   14. Do you have sleep apnea, excessive  snoring or daytime drowsiness? YES -    15. Do you have any prosthetic heart valves? No   16. Do you have prosthetic joints? No         HPI:     HPI related to upcoming procedure:     Riley Lagos is a 50 year old who presents for preoperative evaluation as requested by Dr Wise prior to cystoscopy for removal of 14 mm left ureteral stone.     MEDICAL HISTORY:     Patient Active Problem List    Diagnosis Date Noted     Ureteral stone 05/28/2020     Priority: Medium     Added automatically from request for surgery [id number]       Pneumonia 04/01/2020     Priority: Medium     Neoplasm of uncertain behavior of skin 06/21/2017     Priority: Medium     Cherry angioma 06/21/2017     Priority: Medium     Multiple nevi 06/21/2017     Priority: Medium      Past Medical History:   Diagnosis Date     Allergic rhinitis      Kidney stone      Pneumonia      Past Surgical History:   Procedure Laterality Date     APPENDECTOMY OPEN       EYE SURGERY      lasix     KNEE SURGERY      left knee     ORTHOPEDIC SURGERY       TESTICLE SURGERY       VASECTOMY       Current Outpatient Medications   Medication Sig Dispense Refill     fluticasone (FLONASE) 50 MCG/ACT nasal spray Spray 1 spray into both nostrils daily        Loratadine (CLARITIN) 10 MG capsule Take 10 mg by mouth daily.       Multiple Vitamins-Minerals (MULTIVITAL) TABS Take 1 tablet by mouth daily       OTC products: None, except as noted above    Allergies   Allergen Reactions     Cats      Red itchy eyes      Dogs      Red itchy eyes      Dust Mites      Runny nose and dizzy      Latex Allergy: NO    Social History     Tobacco Use     Smoking status: Never Smoker     Smokeless tobacco: Never Used   Substance Use Topics     Alcohol use: Yes     Alcohol/week: 3.3 standard drinks     Types: 4 Standard drinks or equivalent per week     Comment: 2-4 drinks per week     History   Drug Use Unknown       REVIEW OF SYSTEMS:   Constitutional, HEENT, cardiovascular, pulmonary,  gi and gu systems are negative, except as otherwise noted.    EXAM:   /62 (Cuff Size: Adult Large)   Pulse 72   Temp 98  F (36.7  C) (Oral)   Resp 16   Wt 113.9 kg (251 lb)   SpO2 97%   BMI 30.55 kg/m      GENERAL APPEARANCE: healthy, alert and no distress     EYES: EOMI,  PERRL     HENT: ear canals and TM's normal and nose and mouth without ulcers or lesions     NECK: no adenopathy, no asymmetry, masses, or scars and thyroid normal to palpation     RESP: lungs clear to auscultation - no rales, rhonchi or wheezes     CV: regular rates and rhythm, normal S1 S2, no S3 or S4 and no murmur, click or rub     ABDOMEN:  soft, nontender, no HSM or masses and bowel sounds normal     MS: extremities normal- no gross deformities noted, no evidence of inflammation in joints, FROM in all extremities.     SKIN: no suspicious lesions or rashes     NEURO: Normal strength and tone, sensory exam grossly normal, mentation intact and speech normal     PSYCH: mentation appears normal. and affect normal/bright     LYMPHATICS: No cervical adenopathy    DIAGNOSTICS:   No labs or EKG required for low risk surgery (cataract, skin procedure, breast biopsy, etc)    Recent Labs   Lab Test 04/02/20  0610 04/01/20  1927   HGB 13.8 14.7    199    137   POTASSIUM 3.6 3.9   CR 0.69 0.82        IMPRESSION:   Reason for surgery/procedure: ureteral stone  Diagnosis/reason for consult: preop    The proposed surgical procedure is considered LOW risk.    REVISED CARDIAC RISK INDEX  The patient has the following serious cardiovascular risks for perioperative complications such as (MI, PE, VFib and 3  AV Block):  No serious cardiac risks  INTERPRETATION: 0 risks: Class I (very low risk - 0.4% complication rate)    The patient has the following additional risks for perioperative complications:  No identified additional risks      ICD-10-CM    1. Preop general physical exam  Z01.818    2. Ureteral stone  N20.1        RECOMMENDATIONS:      --Patient is to take all scheduled medications on the day of surgery EXCEPT for modifications listed     APPROVAL GIVEN to proceed with proposed procedure, without further diagnostic evaluation       Signed Electronically by: Rodney Ribeiro MD    Copy of this evaluation report is provided to requesting physician.    [facility] Preop Guidelines    Revised Cardiac Risk Index

## 2020-06-19 ENCOUNTER — HOSPITAL ENCOUNTER (OUTPATIENT)
Facility: CLINIC | Age: 50
Discharge: HOME | End: 2020-06-19
Attending: UROLOGY | Admitting: UROLOGY
Payer: COMMERCIAL

## 2020-06-19 ENCOUNTER — ANESTHESIA (OUTPATIENT)
Dept: SURGERY | Facility: CLINIC | Age: 50
End: 2020-06-19
Payer: COMMERCIAL

## 2020-06-19 ENCOUNTER — APPOINTMENT (OUTPATIENT)
Dept: GENERAL RADIOLOGY | Facility: CLINIC | Age: 50
End: 2020-06-19
Attending: UROLOGY
Payer: COMMERCIAL

## 2020-06-19 ENCOUNTER — ANESTHESIA EVENT (OUTPATIENT)
Dept: SURGERY | Facility: CLINIC | Age: 50
End: 2020-06-19
Payer: COMMERCIAL

## 2020-06-19 VITALS
SYSTOLIC BLOOD PRESSURE: 135 MMHG | HEART RATE: 53 BPM | DIASTOLIC BLOOD PRESSURE: 88 MMHG | RESPIRATION RATE: 12 BRPM | BODY MASS INDEX: 29.53 KG/M2 | WEIGHT: 242.51 LBS | TEMPERATURE: 96.8 F | HEIGHT: 76 IN | OXYGEN SATURATION: 95 %

## 2020-06-19 DIAGNOSIS — N20.1 URETERAL STONE: ICD-10-CM

## 2020-06-19 LAB — COPATH REPORT: NORMAL

## 2020-06-19 PROCEDURE — 37000009 ZZH ANESTHESIA TECHNICAL FEE, EACH ADDTL 15 MIN: Performed by: UROLOGY

## 2020-06-19 PROCEDURE — 52356 CYSTO/URETERO W/LITHOTRIPSY: CPT | Mod: LT | Performed by: UROLOGY

## 2020-06-19 PROCEDURE — 82365 CALCULUS SPECTROSCOPY: CPT | Performed by: UROLOGY

## 2020-06-19 PROCEDURE — 25800025 ZZH RX 258: Performed by: UROLOGY

## 2020-06-19 PROCEDURE — 36000060 ZZH SURGERY LEVEL 3 W FLUORO 1ST 30 MIN: Performed by: UROLOGY

## 2020-06-19 PROCEDURE — 25800030 ZZH RX IP 258 OP 636: Performed by: ANESTHESIOLOGY

## 2020-06-19 PROCEDURE — 37000008 ZZH ANESTHESIA TECHNICAL FEE, 1ST 30 MIN: Performed by: UROLOGY

## 2020-06-19 PROCEDURE — 71000027 ZZH RECOVERY PHASE 2 EACH 15 MINS: Performed by: UROLOGY

## 2020-06-19 PROCEDURE — 25000125 ZZHC RX 250: Performed by: UROLOGY

## 2020-06-19 PROCEDURE — 25000128 H RX IP 250 OP 636

## 2020-06-19 PROCEDURE — 88300 SURGICAL PATH GROSS: CPT | Mod: 26 | Performed by: UROLOGY

## 2020-06-19 PROCEDURE — C2617 STENT, NON-COR, TEM W/O DEL: HCPCS | Performed by: UROLOGY

## 2020-06-19 PROCEDURE — 25000125 ZZHC RX 250

## 2020-06-19 PROCEDURE — 40000277 XR SURGERY CARM FLUORO LESS THAN 5 MIN W STILLS: Mod: TC

## 2020-06-19 PROCEDURE — 25000128 H RX IP 250 OP 636: Performed by: UROLOGY

## 2020-06-19 PROCEDURE — 88300 SURGICAL PATH GROSS: CPT | Performed by: UROLOGY

## 2020-06-19 PROCEDURE — 25500064 ZZH RX 255 OP 636: Performed by: UROLOGY

## 2020-06-19 PROCEDURE — 71000012 ZZH RECOVERY PHASE 1 LEVEL 1 FIRST HR: Performed by: UROLOGY

## 2020-06-19 PROCEDURE — 74420 UROGRAPHY RTRGR +-KUB: CPT | Mod: 26 | Performed by: UROLOGY

## 2020-06-19 PROCEDURE — 40000306 ZZH STATISTIC PRE PROC ASSESS II: Performed by: UROLOGY

## 2020-06-19 PROCEDURE — 36000058 ZZH SURGERY LEVEL 3 EA 15 ADDTL MIN: Performed by: UROLOGY

## 2020-06-19 PROCEDURE — 27210794 ZZH OR GENERAL SUPPLY STERILE: Performed by: UROLOGY

## 2020-06-19 PROCEDURE — C1769 GUIDE WIRE: HCPCS | Performed by: UROLOGY

## 2020-06-19 RX ORDER — HYDROMORPHONE HYDROCHLORIDE 1 MG/ML
.3-.5 INJECTION, SOLUTION INTRAMUSCULAR; INTRAVENOUS; SUBCUTANEOUS EVERY 10 MIN PRN
Status: DISCONTINUED | OUTPATIENT
Start: 2020-06-19 | End: 2020-06-19 | Stop reason: HOSPADM

## 2020-06-19 RX ORDER — KETOROLAC TROMETHAMINE 30 MG/ML
INJECTION, SOLUTION INTRAMUSCULAR; INTRAVENOUS PRN
Status: DISCONTINUED | OUTPATIENT
Start: 2020-06-19 | End: 2020-06-19

## 2020-06-19 RX ORDER — CEFAZOLIN SODIUM 2 G/100ML
2 INJECTION, SOLUTION INTRAVENOUS
Status: COMPLETED | OUTPATIENT
Start: 2020-06-19 | End: 2020-06-19

## 2020-06-19 RX ORDER — FENTANYL CITRATE 50 UG/ML
25-50 INJECTION, SOLUTION INTRAMUSCULAR; INTRAVENOUS
Status: DISCONTINUED | OUTPATIENT
Start: 2020-06-19 | End: 2020-06-19 | Stop reason: HOSPADM

## 2020-06-19 RX ORDER — SODIUM CHLORIDE, SODIUM LACTATE, POTASSIUM CHLORIDE, CALCIUM CHLORIDE 600; 310; 30; 20 MG/100ML; MG/100ML; MG/100ML; MG/100ML
INJECTION, SOLUTION INTRAVENOUS CONTINUOUS
Status: DISCONTINUED | OUTPATIENT
Start: 2020-06-19 | End: 2020-06-19 | Stop reason: HOSPADM

## 2020-06-19 RX ORDER — MEPERIDINE HYDROCHLORIDE 50 MG/ML
12.5 INJECTION INTRAMUSCULAR; INTRAVENOUS; SUBCUTANEOUS
Status: DISCONTINUED | OUTPATIENT
Start: 2020-06-19 | End: 2020-06-19 | Stop reason: HOSPADM

## 2020-06-19 RX ORDER — ONDANSETRON 2 MG/ML
4 INJECTION INTRAMUSCULAR; INTRAVENOUS EVERY 30 MIN PRN
Status: DISCONTINUED | OUTPATIENT
Start: 2020-06-19 | End: 2020-06-19 | Stop reason: HOSPADM

## 2020-06-19 RX ORDER — ATROPA BELLADONNA AND OPIUM 16.2; 3 MG/1; MG/1
SUPPOSITORY RECTAL PRN
Status: DISCONTINUED | OUTPATIENT
Start: 2020-06-19 | End: 2020-06-19 | Stop reason: HOSPADM

## 2020-06-19 RX ORDER — DEXAMETHASONE SODIUM PHOSPHATE 4 MG/ML
INJECTION, SOLUTION INTRA-ARTICULAR; INTRALESIONAL; INTRAMUSCULAR; INTRAVENOUS; SOFT TISSUE PRN
Status: DISCONTINUED | OUTPATIENT
Start: 2020-06-19 | End: 2020-06-19

## 2020-06-19 RX ORDER — HYDRALAZINE HYDROCHLORIDE 20 MG/ML
2.5-5 INJECTION INTRAMUSCULAR; INTRAVENOUS EVERY 10 MIN PRN
Status: DISCONTINUED | OUTPATIENT
Start: 2020-06-19 | End: 2020-06-19 | Stop reason: HOSPADM

## 2020-06-19 RX ORDER — LABETALOL 20 MG/4 ML (5 MG/ML) INTRAVENOUS SYRINGE
10
Status: DISCONTINUED | OUTPATIENT
Start: 2020-06-19 | End: 2020-06-19 | Stop reason: HOSPADM

## 2020-06-19 RX ORDER — PHENYLEPHRINE HYDROCHLORIDE 10 MG/ML
INJECTION INTRAVENOUS PRN
Status: DISCONTINUED | OUTPATIENT
Start: 2020-06-19 | End: 2020-06-19

## 2020-06-19 RX ORDER — LIDOCAINE HYDROCHLORIDE 10 MG/ML
INJECTION, SOLUTION INFILTRATION; PERINEURAL PRN
Status: DISCONTINUED | OUTPATIENT
Start: 2020-06-19 | End: 2020-06-19

## 2020-06-19 RX ORDER — PROPOFOL 10 MG/ML
INJECTION, EMULSION INTRAVENOUS PRN
Status: DISCONTINUED | OUTPATIENT
Start: 2020-06-19 | End: 2020-06-19

## 2020-06-19 RX ORDER — ONDANSETRON 4 MG/1
4 TABLET, ORALLY DISINTEGRATING ORAL EVERY 30 MIN PRN
Status: DISCONTINUED | OUTPATIENT
Start: 2020-06-19 | End: 2020-06-19 | Stop reason: HOSPADM

## 2020-06-19 RX ORDER — ONDANSETRON 2 MG/ML
INJECTION INTRAMUSCULAR; INTRAVENOUS PRN
Status: DISCONTINUED | OUTPATIENT
Start: 2020-06-19 | End: 2020-06-19

## 2020-06-19 RX ORDER — NALOXONE HYDROCHLORIDE 0.4 MG/ML
.1-.4 INJECTION, SOLUTION INTRAMUSCULAR; INTRAVENOUS; SUBCUTANEOUS
Status: DISCONTINUED | OUTPATIENT
Start: 2020-06-19 | End: 2020-06-19 | Stop reason: HOSPADM

## 2020-06-19 RX ORDER — FENTANYL CITRATE 50 UG/ML
INJECTION, SOLUTION INTRAMUSCULAR; INTRAVENOUS PRN
Status: DISCONTINUED | OUTPATIENT
Start: 2020-06-19 | End: 2020-06-19

## 2020-06-19 RX ORDER — GLYCOPYRROLATE 0.2 MG/ML
INJECTION, SOLUTION INTRAMUSCULAR; INTRAVENOUS PRN
Status: DISCONTINUED | OUTPATIENT
Start: 2020-06-19 | End: 2020-06-19

## 2020-06-19 RX ORDER — LIDOCAINE 40 MG/G
CREAM TOPICAL
Status: DISCONTINUED | OUTPATIENT
Start: 2020-06-19 | End: 2020-06-19 | Stop reason: HOSPADM

## 2020-06-19 RX ORDER — CEFAZOLIN SODIUM 1 G/3ML
1 INJECTION, POWDER, FOR SOLUTION INTRAMUSCULAR; INTRAVENOUS SEE ADMIN INSTRUCTIONS
Status: DISCONTINUED | OUTPATIENT
Start: 2020-06-19 | End: 2020-06-19 | Stop reason: HOSPADM

## 2020-06-19 RX ADMIN — FENTANYL CITRATE 100 MCG: 50 INJECTION, SOLUTION INTRAMUSCULAR; INTRAVENOUS at 13:42

## 2020-06-19 RX ADMIN — MIDAZOLAM 2 MG: 1 INJECTION INTRAMUSCULAR; INTRAVENOUS at 13:38

## 2020-06-19 RX ADMIN — CEFAZOLIN SODIUM 2 G: 2 INJECTION, SOLUTION INTRAVENOUS at 13:38

## 2020-06-19 RX ADMIN — SODIUM CHLORIDE, POTASSIUM CHLORIDE, SODIUM LACTATE AND CALCIUM CHLORIDE: 600; 310; 30; 20 INJECTION, SOLUTION INTRAVENOUS at 13:38

## 2020-06-19 RX ADMIN — ONDANSETRON HYDROCHLORIDE 4 MG: 2 INJECTION, SOLUTION INTRAVENOUS at 13:49

## 2020-06-19 RX ADMIN — KETOROLAC TROMETHAMINE 30 MG: 30 INJECTION, SOLUTION INTRAMUSCULAR at 13:49

## 2020-06-19 RX ADMIN — GLYCOPYRROLATE 0.2 MG: 0.2 INJECTION, SOLUTION INTRAMUSCULAR; INTRAVENOUS at 13:42

## 2020-06-19 RX ADMIN — DEXAMETHASONE SODIUM PHOSPHATE 4 MG: 4 INJECTION, SOLUTION INTRA-ARTICULAR; INTRALESIONAL; INTRAMUSCULAR; INTRAVENOUS; SOFT TISSUE at 13:42

## 2020-06-19 RX ADMIN — LIDOCAINE HYDROCHLORIDE 40 MG: 10 INJECTION, SOLUTION INFILTRATION; PERINEURAL at 13:42

## 2020-06-19 RX ADMIN — PROPOFOL 200 MG: 10 INJECTION, EMULSION INTRAVENOUS at 13:42

## 2020-06-19 RX ADMIN — PHENYLEPHRINE HYDROCHLORIDE 100 MCG: 10 INJECTION INTRAVENOUS at 14:09

## 2020-06-19 ASSESSMENT — MIFFLIN-ST. JEOR: SCORE: 2056.5

## 2020-06-19 NOTE — DISCHARGE INSTRUCTIONS
CYSTOSCOPY DISCHARGE INSTRUCTIONS  [facility]  [address]  [phone]    YOU MAY GO BACK TO YOUR NORMAL DIET AND ACTIVITY, UNLESS YOUR DOCTOR TELLS YOU NOT TO.    FOR THE NEXT TWO DAYS, YOU MAY NOTICE:    SOME BLOOD IN YOUR URINE.  SOME BURNING WHEN YOU URINATE.  AN URGE TO URINATE MORE OFTEN.  BLADDER SPASMS.    THESE ARE NORMAL AFTER THE PROCEDURE.  THEY SHOULD GO AWAY AFTER A DAY OR TWO.  TO RELIEVE THESE PROBLEMS:     DRINK 6 TO 8 LARGE GLASSES OF WATER EACH DAY (INCLUDES DRINKS AT MEALS).  THIS WILL HELP CLEAR THE URINE.    TAKE WARM BATHS TO RELIEVE PAIN AND BLADDER SPASMS.  DO NOT ADD ANYTHING TO THE BATH WATER.    YOUR DOCTOR MAY PRESCRIBE PAIN MEDICINE.  YOU MAY ALSO TAKE TYLENOL (ACETAMINOPHEN) FOR PAIN.    CALL YOUR SURGEON IF YOU HAVE:    A FEVER OVER 101 DEGREES.  CHECK YOUR TEMPERATURE UNDER YOUR TONGUE.    CHILLS.    FAILURE TO URINATE (NO URINE COMES OUT WHEN YOU TRY TO USE THE TOILET).  TRY SOAKING IN A BATHTUB FULL OF WARM WATER.  IF STILL NO URINE, CALL YOUR DOCTOR.    A LOT OF BLOOD IN THE URINE, OR BLOOD CLOTS LARGER THAN A NICKEL.      PAIN IN THE BACK OR BELLY AREA (ABDOMEN).    PAIN OR SPASMS THAT ARE NOT RELIEVED BY WARM TUB BATHS AND PAIN MEDICINE.      SEVERE PAIN, BURNING OR OTHER PROBLEMS WHILE PASSING URINE.    PAIN THAT GETS WORSE AFTER TWO DAYS.        STENT INFORMATION/DISCHARGE INSTRUCTIONS  [facility]  [address]  [phone]    During surgery, a stent may be placed in the ureter.  The ureter is the tube that drains urine from the kidney to the bladder.  The stent is placed to dilate (open) the ureter so stone fragments can pass easily through the ureter or to decrease ureteral swelling after surgery or to relieve an obstruction.      The stent is made of silicone.  The upper end of the stent curls in the kidney while the lower end rests in the bladder.    While the stent is in place you may experience the  following symptoms:  Blood and/or small blood clots in the urine  Bladder spasms (frequency and urgency of urination)  Discomfort or aching in the back or side where the stent is  Burning or discomfort at the end of urine stream    To decrease these symptoms you should:  Take antispasmodic medication as prescribed (Detrol, Ditropan, etc.)  Drink plenty of fluids but avoid caffeine and citrus (include cranberry)  If you are having discomfort in back or side, decrease activity    Please call your physician or the physician on call if you experience:  Fever greater than 101 degrees  Severe pain not relieved by pain medication or rest    Please make an appointment for the removal of the stent according to your physician's instructions.        GENERAL ANESTHESIA OR SEDATION ADULT DISCHARGE INSTRUCTIONS   SPECIAL PRECAUTIONS FOR 24 HOURS AFTER SURGERY    IT IS NOT UNUSUAL TO FEEL LIGHT-HEADED OR FAINT, UP TO 24 HOURS AFTER SURGERY OR WHILE TAKING PAIN MEDICATION.  IF YOU HAVE THESE SYMPTOMS; SIT FOR A FEW MINUTES BEFORE STANDING AND HAVE SOMEONE ASSIST YOU WHEN YOU GET UP TO WALK OR USE THE BATHROOM.    YOU SHOULD REST AND RELAX FOR THE NEXT 24 HOURS AND YOU MUST MAKE ARRANGEMENTS TO HAVE SOMEONE STAY WITH YOU FOR AT LEAST 24 HOURS AFTER YOUR DISCHARGE.  AVOID HAZARDOUS AND STRENUOUS ACTIVITIES.  DO NOT MAKE IMPORTANT DECISIONS FOR 24 HOURS.    DO NOT DRIVE ANY VEHICLE OR OPERATE MECHANICAL EQUIPMENT FOR 24 HOURS FOLLOWING THE END OF YOUR SURGERY.  EVEN THOUGH YOU MAY FEEL NORMAL, YOUR REACTIONS MAY BE AFFECTED BY THE MEDICATION YOU HAVE RECEIVED.    DO NOT DRINK ALCOHOLIC BEVERAGES FOR 24 HOURS FOLLOWING YOUR SURGERY.    DRINK CLEAR LIQUIDS (APPLE JUICE, GINGER ALE, 7-UP, BROTH, ETC.).  PROGRESS TO YOUR REGULAR DIET AS YOU FEEL ABLE.    YOU MAY HAVE A DRY MOUTH, A SORE THROAT, MUSCLES ACHES OR TROUBLE SLEEPING.  THESE SHOULD GO AWAY AFTER 24 HOURS.    CALL YOUR DOCTOR FOR ANY OF THE FOLLOWING:  SIGNS OF INFECTION (FEVER,  GROWING TENDERNESS AT THE SURGERY SITE, A LARGE AMOUNT OF DRAINAGE OR BLEEDING, SEVERE PAIN, FOUL-SMELLING DRAINAGE, REDNESS OR SWELLING.    IT HAS BEEN OVER 8 TO 10 HOURS SINCE SURGERY AND YOU ARE STILL NOT ABLE TO URINATE (PASS WATER).           You received Toradol, an IV form of ibuprofen (Motrin) at 1:45PM.  Do not take any ibuprofen products until 7:45PM.

## 2020-06-19 NOTE — ANESTHESIA PREPROCEDURE EVALUATION
Anesthesia Pre-Procedure Evaluation    Patient: Riley Lagos   MRN: [id number] : 1970          Preoperative Diagnosis: Ureteral stone [N20.1]    Procedure(s):  Cystoscopy, left ureteroscopy with holmium laser lithotripsy, left ureteral stent placement    Past Medical History:   Diagnosis Date     Allergic rhinitis      Kidney stone      Pneumonia 2020     Past Surgical History:   Procedure Laterality Date     APPENDECTOMY OPEN       EYE SURGERY      lasix     KNEE SURGERY      left knee     ORTHOPEDIC SURGERY       TESTICLE SURGERY       VASECTOMY       Anesthesia Evaluation     . Pt has had prior anesthetic.            ROS/MED HX    ENT/Pulmonary:     (+)sleep apnea, , . .    Neurologic:  - neg neurologic ROS     Cardiovascular:  - neg cardiovascular ROS       METS/Exercise Tolerance:     Hematologic:  - neg hematologic  ROS       Musculoskeletal:  - neg musculoskeletal ROS       GI/Hepatic:  - neg GI/hepatic ROS       Renal/Genitourinary:     (+) Nephrolithiasis ,       Endo:     (+) Obesity, .      Psychiatric:         Infectious Disease:         Malignancy:         Other:                          Physical Exam      Airway   Mallampati: II  TM distance: >3 FB  Neck ROM: full    Dental     Cardiovascular       Pulmonary             Lab Results   Component Value Date    WBC 6.4 2020    HGB 13.8 2020    HCT 43.0 2020     2020    .0 (H) 2020     2020    POTASSIUM 3.6 2020    CHLORIDE 107 2020    CO2 23 2020    BUN 17 2020    CR 0.69 2020     (H) 2020    MINAL 8.2 (L) 2020    ALBUMIN 3.3 (L) 2020    PROTTOTAL 7.1 2020    ALT 78 (H) 2020    AST 29 2020    ALKPHOS 113 2020    BILITOTAL 0.7 2020       Preop Vitals  BP Readings from Last 3 Encounters:   20 128/62   20 119/70   04/10/18 114/72    Pulse Readings from Last 3 Encounters:   20 72   20  "76   05/04/17 56      Resp Readings from Last 3 Encounters:   06/18/20 16   04/03/20 18   05/30/17 13    SpO2 Readings from Last 3 Encounters:   06/18/20 97%   04/03/20 95%   05/04/17 95%      Temp Readings from Last 1 Encounters:   06/18/20 98  F (36.7  C) (Oral)    Ht Readings from Last 1 Encounters:   04/22/20 1.93 m (6' 4\")      Wt Readings from Last 1 Encounters:   06/18/20 113.9 kg (251 lb)    Estimated body mass index is 30.55 kg/m  as calculated from the following:    Height as of 4/22/20: 1.93 m (6' 4\").    Weight as of 6/18/20: 113.9 kg (251 lb).       Anesthesia Plan      History & Physical Review      ASA Status:  2 .    NPO Status:  > 8 hours    Plan for General with Intravenous and Propofol induction. Maintenance will be Balanced.    PONV prophylaxis:  Ondansetron (or other 5HT-3) and Dexamethasone or Solumedrol         Postoperative Care  Postoperative pain management:  IV analgesics.      Consents  Anesthetic plan, risks, benefits and alternatives discussed with:  Patient..                 Shekhar Foy MD                    .  "

## 2020-06-19 NOTE — ANESTHESIA CARE TRANSFER NOTE
Patient: Riley Lagos    Procedure(s):  Cystoscopy, left ureteroscopy with holmium laser lithotripsy, left ureteral stent placement    Diagnosis: Ureteral stone [N20.1]  Diagnosis Additional Information: No value filed.    Anesthesia Type:   General     Note:  Airway :Face Mask  Patient transferred to:PACU  Comments: Pt to PACU, VSS, report to RNHandoff Report: Identifed the Patient, Identified the Reponsible Provider, Reviewed the pertinent medical history, Discussed the surgical course, Reviewed Intra-OP anesthesia mangement and issues during anesthesia, Set expectations for post-procedure period and Allowed opportunity for questions and acknowledgement of understanding      Vitals: (Last set prior to Anesthesia Care Transfer)    CRNA VITALS  6/19/2020 1401 - 6/19/2020 1436      6/19/2020             Pulse:  93    SpO2:  96 %    Resp Rate (observed):  (!) 2                Electronically Signed By: SONJA Carty CRNA  June 19, 2020  2:36 PM

## 2020-06-19 NOTE — ANESTHESIA POSTPROCEDURE EVALUATION
Patient: Riley Lagos    Procedure(s):  Cystoscopy, left ureteroscopy with holmium laser lithotripsy, left ureteral stent placement    Diagnosis:Ureteral stone [N20.1]  Diagnosis Additional Information: No value filed.    Anesthesia Type:  General    Note:  Anesthesia Post Evaluation    Patient location during evaluation: PACU  Patient participation: Able to fully participate in evaluation  Level of consciousness: awake  Pain management: adequate  Airway patency: patent  Cardiovascular status: acceptable  Respiratory status: acceptable  Hydration status: acceptable  PONV: none             Last vitals:  Vitals:    06/19/20 1436 06/19/20 1445 06/19/20 1500   BP: 114/75 117/77 108/87   Pulse: 79 68    Resp: 11 13 13   Temp: 97.5  F (36.4  C)     SpO2: 97% 95% 96%         Electronically Signed By: Shekhar Foy MD  June 19, 2020  3:25 PM

## 2020-06-20 NOTE — OP NOTE
Procedure Date: 06/19/2020      ATTENDING SURGEON:  Andrew Chavarria MD.      PREOPERATIVE DIAGNOSIS:  Left ureteral stone.      POSTOPERATIVE DIAGNOSIS:  Left ureteral stone.      PROCEDURE  PERFORMED:  Cystoscopy, left retrograde pyelogram, interpretation of fluoroscopic images, left ureteroscopy with holmium lithotripsy and stone basketing, placement of A 5 x 28 double-J left ureteral stent.      ANESTHESIA:  General.      COMPLICATIONS:  None.      INDICATIONS FOR PROCEDURE:  Riley Lagos is a 50-year-old gentleman with a mid left ureteral stone who now presents for removal.      DESCRIPTION OF PROCEDURE:   The risks and benefits of the procedure were explained in detail to the patient and informed consent was obtained.  The patient was brought to the operating room and placed supine on the table where he underwent general endotracheal anesthetic.  He was then moved down to the dorsal lithotomy position where he was prepped and draped in standard sterile fashion.      The procedure began by introducing the 22-Icelandic cystoscope through the urethra and into the bladder to perform a cystoscopy.  There were no urothelial abnormalities identified.  I cannulated the left reverse ureteral catheter and performed retrograde pyelogram.  There was hydronephrosis, hydroureter down to the distal left ureter.  I passed a Glidewire to the left kidney and backed it off the ureteral catheter along with the scope.  Alongside the Glidewire, I passed a rigid ureteroscope through the urethra and into the bladder and up the left ureter.  The stone was seen in the distal left ureter.  I used a 365 micron holmium laser fiber to break up the stone into multiple fragments.  These fragments were basketed out and placed in the bladder.  I then performed ureteroscopy all the way up to the left kidney and no stones remained.  I removed the ureteroscope.  I reloaded the cystoscope over the Glidewire until the left ureteral orifice was  visualized.  I passed a 5 x 28 double-J ureteral stent over the wire.  The wire was pulled back and a good curl could be seen in the renal pelvis under fluoroscopy.  A good curl was seen in the bladder under direct visualization.  The bladder was drained and the stone fragments were collected.  The scope was removed.  I placed a B and O suppository at the end the case.      The patient tolerated the procedure without complications.  He went to post-anesthetic care unit in good condition.  He will go home from there.  I am seeing him back next week for stent removal in the office.         ESTUARDO ENGEL MD             D: 2020   T: 2020   MT:       Name:     DOT ANDERSEN   MRN:      [id number]        Account:        [id number]   :      1970           Procedure Date: 2020      Document: [id number]

## 2020-06-22 LAB
APPEARANCE STONE: NORMAL
COMPN STONE: NORMAL
NUMBER STONE: NORMAL
SIZE STONE: NORMAL MM
WT STONE: 202 MG

## 2020-06-25 ENCOUNTER — TELEPHONE (OUTPATIENT)
Dept: UROLOGY | Facility: CLINIC | Age: 50
End: 2020-06-25

## 2020-06-25 NOTE — TELEPHONE ENCOUNTER
Called to screen for CODVID-19 symptoms prior to tomorrow's visit.  Patient does not have any symptoms at this time.    Chana Mohan, EMT

## 2020-06-26 ENCOUNTER — OFFICE VISIT (OUTPATIENT)
Dept: UROLOGY | Facility: CLINIC | Age: 50
End: 2020-06-26
Payer: COMMERCIAL

## 2020-06-26 VITALS
HEIGHT: 76 IN | WEIGHT: 242 LBS | SYSTOLIC BLOOD PRESSURE: 132 MMHG | BODY MASS INDEX: 29.47 KG/M2 | DIASTOLIC BLOOD PRESSURE: 90 MMHG

## 2020-06-26 DIAGNOSIS — N20.1 URETERAL STONE: Primary | ICD-10-CM

## 2020-06-26 DIAGNOSIS — Z79.2 PROPHYLACTIC ANTIBIOTIC: ICD-10-CM

## 2020-06-26 PROCEDURE — 99213 OFFICE O/P EST LOW 20 MIN: CPT | Mod: 25 | Performed by: UROLOGY

## 2020-06-26 PROCEDURE — 52310 CYSTOSCOPY AND TREATMENT: CPT | Performed by: UROLOGY

## 2020-06-26 RX ORDER — LIDOCAINE HYDROCHLORIDE 20 MG/ML
JELLY TOPICAL ONCE
Status: COMPLETED | OUTPATIENT
Start: 2020-06-26 | End: 2020-06-26

## 2020-06-26 RX ORDER — CIPROFLOXACIN 500 MG/1
500 TABLET, FILM COATED ORAL ONCE
Qty: 1 TABLET | Refills: 0 | Status: SHIPPED | OUTPATIENT
Start: 2020-06-26 | End: 2020-06-26

## 2020-06-26 RX ADMIN — LIDOCAINE HYDROCHLORIDE: 20 JELLY TOPICAL at 11:17

## 2020-06-26 ASSESSMENT — MIFFLIN-ST. JEOR: SCORE: 2054.2

## 2020-06-26 ASSESSMENT — PAIN SCALES - GENERAL: PAINLEVEL: NO PAIN (0)

## 2020-06-26 NOTE — LETTER
6/26/2020       RE: Riley Lagos  [address] [address]     Dear Colleague,    Thank you for referring your patient, Riley Lagos, to the [facility] [place] at [facility]. Please see a copy of my visit note below.    Office Visit Note  [facility]  [phone]    UROLOGIC DIAGNOSES:   Left ureteral stone    CURRENT INTERVENTIONS:   S/P ureteroscopy    HISTORY:   Jb is here today for postoperative stent removal.  His stone was removed in the operating room.  He has felt well since his procedure.  His stone was composed of calcium oxalate mixed with calcium phosphate.  Serum calcium levels are normal.      PAST MEDICAL HISTORY:   Past Medical History:   Diagnosis Date     Allergic rhinitis      Kidney stone      Pneumonia 04/2020       PAST SURGICAL HISTORY:   Past Surgical History:   Procedure Laterality Date     APPENDECTOMY OPEN       EYE SURGERY      lasix     KNEE SURGERY      left knee     LASER HOLMIUM LITHOTRIPSY URETER(S), INSERT STENT, COMBINED Left 6/19/2020    Procedure: Cystoscopy, left retrograde pyelogram, interpretation of fluoroscopic images, left ureteroscopy with holmium lithotripsy and stone basketing, placement of A 5 x 28 double-J left ureteral stent.;  Surgeon: Andrew Chavarria MD;  Location: [facility] OR     ORTHOPEDIC SURGERY       TESTICLE SURGERY       VASECTOMY         FAMILY HISTORY:   Family History   Problem Relation Age of Onset     Prostate Cancer Father         age 65     Diabetes Mother      Heart Failure Paternal Grandmother      Colon Cancer No family hx of        SOCIAL HISTORY:   Social History     Socioeconomic History     Marital status:      Spouse name: None     Number of children: None     Years of education: None     Highest education level: None   Occupational History     None   Social Needs     Financial resource strain: None     Food insecurity  "    Worry: None     Inability: None     Transportation needs     Medical: None     Non-medical: None   Tobacco Use     Smoking status: Never Smoker     Smokeless tobacco: Never Used   Substance and Sexual Activity     Alcohol use: Yes     Alcohol/week: 3.3 standard drinks     Types: 4 Standard drinks or equivalent per week     Comment: 2-4 drinks per week     Drug use: Never     Sexual activity: None   Lifestyle     Physical activity     Days per week: None     Minutes per session: None     Stress: None   Relationships     Social connections     Talks on phone: None     Gets together: None     Attends Muslim service: None     Active member of club or organization: None     Attends meetings of clubs or organizations: None     Relationship status: None     Intimate partner violence     Fear of current or ex partner: None     Emotionally abused: None     Physically abused: None     Forced sexual activity: None   Other Topics Concern     Parent/sibling w/ CABG, MI or angioplasty before 65F 55M? Not Asked   Social History Narrative     None       Review Of Systems:  Skin: No rash, pruritis, or skin pigmentation  Eyes: No changes in vision  Ears/Nose/Throat: No changes in hearing, no nosebleeds  Respiratory: No shortness of breath, dyspnea on exertion, cough, or hemoptysis  Cardiovascular: No chest pain or palpitations  Gastrointestinal: No diarrhea or constipation. No abdominal pain. No hematochezia  Genitourinary: see HPI  Musculoskeletal: No pain or swelling of joints, normal range of motion  Neurologic: No weakness or tremors  Psychiatric: No recent changes in memory or mood  Hematologic/Lymphatic/Immunologic: No easy bruising or enlarged lymph nodes  Endocrine: No weight gain or loss      PHYSICAL EXAM:    BP (!) 132/90   Ht 1.93 m (6' 4\")   Wt 109.8 kg (242 lb)   BMI 29.46 kg/m      Constitutional: Well developed. Conversant and in no acute distress  Eyes: Anicteric sclera, conjunctiva clear, normal " extraocular movements  ENT: Normocephalic and atraumatic,   Skin: Warm and dry. No rashes or lesions  Cardiac: No peripheral edema  Back/Flank: Not done  CNS/PNS: Normal musculature and movements, moves all extremities normally  Respiratory: Normal non-labored breathing  Abdomen: Soft nontender and nondistended  Peripheral Vascular: No peripheral edema  Mental Status/Psych: Alert and Oriented x 3. Normal mood and affect    Penis: Not done  Scrotal Skin: Not done  Testicles: Not done  Epididymis: Not done  Digital Rectal Exam:     Cystoscopy:  I performed flexible cystoscopy today and the stent was removed without difficulty    Imaging: None    Urinalysis: UA RESULTS:  Recent Labs   Lab Test 04/01/20  1927   COLOR Yellow   APPEARANCE Clear   URINEGLC Negative   URINEBILI Negative   URINEKETONE 60*   SG 1.028   UBLD Moderate*   URINEPH 6.0   PROTEIN 100*   NITRITE Negative   LEUKEST Negative   RBCU >182*   WBCU 2       PSA:     Post Void Residual:     Other labs: None today      IMPRESSION:  Doing well, stent removed    PLAN:  We discussed prevention methods for future stones.  I recommended he see me again in 1 year with a KUB to make certain he is not forming any new stones.    Total Time: 15 minutes                                      total in Consultation: 10 minutes      Andrew Chavarria M.D.

## 2020-06-26 NOTE — PROGRESS NOTES
Office Visit Note  [facility]  [phone]    UROLOGIC DIAGNOSES:   Left ureteral stone    CURRENT INTERVENTIONS:   S/P ureteroscopy    HISTORY:   Jb is here today for postoperative stent removal.  His stone was removed in the operating room.  He has felt well since his procedure.  His stone was composed of calcium oxalate mixed with calcium phosphate.  Serum calcium levels are normal.      PAST MEDICAL HISTORY:   Past Medical History:   Diagnosis Date     Allergic rhinitis      Kidney stone      Pneumonia 04/2020       PAST SURGICAL HISTORY:   Past Surgical History:   Procedure Laterality Date     APPENDECTOMY OPEN       EYE SURGERY      lasix     KNEE SURGERY      left knee     LASER HOLMIUM LITHOTRIPSY URETER(S), INSERT STENT, COMBINED Left 6/19/2020    Procedure: Cystoscopy, left retrograde pyelogram, interpretation of fluoroscopic images, left ureteroscopy with holmium lithotripsy and stone basketing, placement of A 5 x 28 double-J left ureteral stent.;  Surgeon: Andrew Chavarria MD;  Location: [facility] OR     ORTHOPEDIC SURGERY       TESTICLE SURGERY       VASECTOMY         FAMILY HISTORY:   Family History   Problem Relation Age of Onset     Prostate Cancer Father         age 65     Diabetes Mother      Heart Failure Paternal Grandmother      Colon Cancer No family hx of        SOCIAL HISTORY:   Social History     Socioeconomic History     Marital status:      Spouse name: None     Number of children: None     Years of education: None     Highest education level: None   Occupational History     None   Social Needs     Financial resource strain: None     Food insecurity     Worry: None     Inability: None     Transportation needs     Medical: None     Non-medical: None   Tobacco Use     Smoking status: Never Smoker     Smokeless tobacco: Never Used   Substance and Sexual Activity     Alcohol use: Yes     Alcohol/week: 3.3 standard drinks     Types: 4 Standard drinks or equivalent per  "week     Comment: 2-4 drinks per week     Drug use: Never     Sexual activity: None   Lifestyle     Physical activity     Days per week: None     Minutes per session: None     Stress: None   Relationships     Social connections     Talks on phone: None     Gets together: None     Attends Zoroastrian service: None     Active member of club or organization: None     Attends meetings of clubs or organizations: None     Relationship status: None     Intimate partner violence     Fear of current or ex partner: None     Emotionally abused: None     Physically abused: None     Forced sexual activity: None   Other Topics Concern     Parent/sibling w/ CABG, MI or angioplasty before 65F 55M? Not Asked   Social History Narrative     None       Review Of Systems:  Skin: No rash, pruritis, or skin pigmentation  Eyes: No changes in vision  Ears/Nose/Throat: No changes in hearing, no nosebleeds  Respiratory: No shortness of breath, dyspnea on exertion, cough, or hemoptysis  Cardiovascular: No chest pain or palpitations  Gastrointestinal: No diarrhea or constipation. No abdominal pain. No hematochezia  Genitourinary: see HPI  Musculoskeletal: No pain or swelling of joints, normal range of motion  Neurologic: No weakness or tremors  Psychiatric: No recent changes in memory or mood  Hematologic/Lymphatic/Immunologic: No easy bruising or enlarged lymph nodes  Endocrine: No weight gain or loss      PHYSICAL EXAM:    BP (!) 132/90   Ht 1.93 m (6' 4\")   Wt 109.8 kg (242 lb)   BMI 29.46 kg/m      Constitutional: Well developed. Conversant and in no acute distress  Eyes: Anicteric sclera, conjunctiva clear, normal extraocular movements  ENT: Normocephalic and atraumatic,   Skin: Warm and dry. No rashes or lesions  Cardiac: No peripheral edema  Back/Flank: Not done  CNS/PNS: Normal musculature and movements, moves all extremities normally  Respiratory: Normal non-labored breathing  Abdomen: Soft nontender and nondistended  Peripheral " Vascular: No peripheral edema  Mental Status/Psych: Alert and Oriented x 3. Normal mood and affect    Penis: Not done  Scrotal Skin: Not done  Testicles: Not done  Epididymis: Not done  Digital Rectal Exam:     Cystoscopy:  I performed flexible cystoscopy today and the stent was removed without difficulty    Imaging: None    Urinalysis: UA RESULTS:  Recent Labs   Lab Test 04/01/20 1927   COLOR Yellow   APPEARANCE Clear   URINEGLC Negative   URINEBILI Negative   URINEKETONE 60*   SG 1.028   UBLD Moderate*   URINEPH 6.0   PROTEIN 100*   NITRITE Negative   LEUKEST Negative   RBCU >182*   WBCU 2       PSA:     Post Void Residual:     Other labs: None today      IMPRESSION:  Doing well, stent removed    PLAN:  We discussed prevention methods for future stones.  I recommended he see me again in 1 year with a KUB to make certain he is not forming any new stones.    Total Time: 15 minutes                                      total in Consultation: 10 minutes      Andrew Chavarria M.D.

## 2020-06-26 NOTE — NURSING NOTE
Chief Complaint   Patient presents with     Ureteral Stone     Here for stent removal     Prior to the start of the procedure and with procedural staff participation, I verbally confirmed the patient s identity using two indicators, relevant allergies, that the procedure was appropriate and matched the consent or emergent situation, and that the correct equipment/implants were available. Immediately prior to starting the procedure I conducted the Time Out with the procedural staff and re-confirmed the patient s name, procedure, and site/side. I have wiped the patient off with the povidone-Iodine solution, draped them, and used Lidocaine hydrochloride jelly. (The Joint Commission universal protocol was followed.)  Yes    Sedation (Moderate or Deep): None    5mL 2% lidocaine hydrochloride Urojet instilled into urethra.    NDC# [address]-44  Lot #: [id number]   Expiration Date:  01/22    Chana Mohan, EMT

## 2020-06-26 NOTE — PATIENT INSTRUCTIONS
"AFTER YOUR CYSTOSCOPY AND STENT REMOVAL  ?  ?  You have just completed a cystoscopy, or \"cysto\", which allowed your physician to learn more about your bladder (or to remove a stent placed after surgery). We suggest that you continue to avoid caffeine, fruit juice, and alcohol for the next 24 hours, however, you are encouraged to return to your normal activities.  ?  ?  A few things that are considered normal after your cystoscopy:  ?  * small amount of bleeding (or spotting) that clears within the next 24 hours  ?  * slight burning sensation with urination  ?  * sensation of needing to void (urinate) more frequently  ?  * the feeling of \"air\" in your urine  ?  * mild discomfort that is relieved with Tylenol    * bladder spasms  ?  ?  ?  Please contact our office promptly if you:  ?  * develop a fever above 101 degrees  ?  * are unable to urinate  ?  * develop bright red blood that does not stop  ?  * experience severe pain or swelling  ?  ?  ?  And of course, please contact our office with any concerns or questions [phone]  ?      "

## 2021-01-14 ENCOUNTER — HEALTH MAINTENANCE LETTER (OUTPATIENT)
Age: 51
End: 2021-01-14

## 2021-05-27 ENCOUNTER — OFFICE VISIT (OUTPATIENT)
Dept: PEDIATRICS | Facility: CLINIC | Age: 51
End: 2021-05-27
Payer: COMMERCIAL

## 2021-05-27 VITALS
SYSTOLIC BLOOD PRESSURE: 130 MMHG | HEIGHT: 75 IN | WEIGHT: 246.4 LBS | DIASTOLIC BLOOD PRESSURE: 62 MMHG | HEART RATE: 48 BPM | OXYGEN SATURATION: 97 % | RESPIRATION RATE: 20 BRPM | BODY MASS INDEX: 30.64 KG/M2 | TEMPERATURE: 96.8 F

## 2021-05-27 DIAGNOSIS — Z00.00 ROUTINE GENERAL MEDICAL EXAMINATION AT A HEALTH CARE FACILITY: Primary | ICD-10-CM

## 2021-05-27 DIAGNOSIS — Z23 NEED FOR DIPHTHERIA-TETANUS-PERTUSSIS (TDAP) VACCINE: ICD-10-CM

## 2021-05-27 DIAGNOSIS — R00.1 SINUS BRADYCARDIA: ICD-10-CM

## 2021-05-27 DIAGNOSIS — Z12.11 SCREEN FOR COLON CANCER: ICD-10-CM

## 2021-05-27 DIAGNOSIS — Z12.5 SCREENING FOR PROSTATE CANCER: ICD-10-CM

## 2021-05-27 DIAGNOSIS — Z13.6 CARDIOVASCULAR SCREENING; LDL GOAL LESS THAN 160: ICD-10-CM

## 2021-05-27 PROBLEM — J18.9 PNEUMONIA: Status: RESOLVED | Noted: 2020-04-01 | Resolved: 2021-05-27

## 2021-05-27 PROCEDURE — 93000 ELECTROCARDIOGRAM COMPLETE: CPT | Performed by: INTERNAL MEDICINE

## 2021-05-27 PROCEDURE — 99396 PREV VISIT EST AGE 40-64: CPT | Mod: 25 | Performed by: INTERNAL MEDICINE

## 2021-05-27 PROCEDURE — 90715 TDAP VACCINE 7 YRS/> IM: CPT | Performed by: INTERNAL MEDICINE

## 2021-05-27 PROCEDURE — 90471 IMMUNIZATION ADMIN: CPT | Performed by: INTERNAL MEDICINE

## 2021-05-27 ASSESSMENT — ENCOUNTER SYMPTOMS
MYALGIAS: 0
DYSURIA: 0
PARESTHESIAS: 0
HEMATURIA: 0
HEADACHES: 0
CONSTIPATION: 0
ABDOMINAL PAIN: 0
HEMATOCHEZIA: 0
JOINT SWELLING: 0
FREQUENCY: 0
PALPITATIONS: 0
DIZZINESS: 0
HEARTBURN: 0
EYE PAIN: 0
FEVER: 0
WEAKNESS: 0
NERVOUS/ANXIOUS: 0
NAUSEA: 0
SHORTNESS OF BREATH: 0
DIARRHEA: 0
CHILLS: 0
COUGH: 0
ARTHRALGIAS: 0
SORE THROAT: 0

## 2021-05-27 ASSESSMENT — MIFFLIN-ST. JEOR: SCORE: 2059.54

## 2021-05-27 NOTE — PROGRESS NOTES
SUBJECTIVE:   CC: Riley Lagos is an 51 year old male who presents for preventative health visit.   Patient has been advised of split billing requirements and indicates understanding: Yes     Healthy Habits:     Getting at least 3 servings of Calcium per day:  Yes    Bi-annual eye exam:  NO    Dental care twice a year:  Yes    Sleep apnea or symptoms of sleep apnea:  Excessive snoring    Diet:  Regular (no restrictions)    Frequency of exercise:  4-5 days/week    Duration of exercise:  15-30 minutes    Taking medications regularly:  Yes    Medication side effects:  None    PHQ-2 Total Score: 0    Additional concerns today:  No      Today's PHQ-2 Score:   PHQ-2 ( 1999 [employer]) 5/27/2021   Q1: Little interest or pleasure in doing things 0   Q2: Feeling down, depressed or hopeless 0   PHQ-2 Score 0   Q1: Little interest or pleasure in doing things Not at all   Q2: Feeling down, depressed or hopeless Not at all   PHQ-2 Score 0       Abuse: Current or Past(Physical, Sexual or Emotional)- No  Do you feel safe in your environment? Yes      Social History     Tobacco Use     Smoking status: Never Smoker     Smokeless tobacco: Never Used   Substance Use Topics     Alcohol use: Yes     Alcohol/week: 3.3 standard drinks     Types: 4 Standard drinks or equivalent per week     Comment: 2-4 drinks per week         Alcohol Use 5/27/2021   Prescreen: >3 drinks/day or >7 drinks/week? No   Prescreen: >3 drinks/day or >7 drinks/week? -       Last PSA:   PSA   Date Value Ref Range Status   05/09/2017 0.71 0 - 4 ug/L Final     Comment:     Assay Method:  Chemiluminescence using Siemens Vista analyzer       Reviewed orders with patient. Reviewed health maintenance and updated orders accordingly - Yes  Patient Active Problem List   Diagnosis     Neoplasm of uncertain behavior of skin     Cherry angioma     Multiple nevi     Ureteral stone     Sinus bradycardia     Past Surgical History:   Procedure Laterality Date     APPENDECTOMY  OPEN       EYE SURGERY      lasix     KNEE SURGERY      left knee     LASER HOLMIUM LITHOTRIPSY URETER(S), INSERT STENT, COMBINED Left 6/19/2020    Procedure: Cystoscopy, left retrograde pyelogram, interpretation of fluoroscopic images, left ureteroscopy with holmium lithotripsy and stone basketing, placement of A 5 x 28 double-J left ureteral stent.;  Surgeon: Andrew Chavarria MD;  Location: [facility] OR     ORTHOPEDIC SURGERY       TESTICLE SURGERY       VASECTOMY         Social History     Tobacco Use     Smoking status: Never Smoker     Smokeless tobacco: Never Used   Substance Use Topics     Alcohol use: Yes     Alcohol/week: 3.3 standard drinks     Types: 4 Standard drinks or equivalent per week     Comment: 2-4 drinks per week     Family History   Problem Relation Age of Onset     Prostate Cancer Father         age 65     Diabetes Mother      Heart Failure Paternal Grandmother      Colon Cancer No family hx of          Current Outpatient Medications   Medication Sig Dispense Refill     fluticasone (FLONASE) 50 MCG/ACT nasal spray Spray 1 spray into both nostrils daily        Loratadine (CLARITIN) 10 MG capsule Take 10 mg by mouth daily.       Multiple Vitamins-Minerals (MULTIVITAL) TABS Take 1 tablet by mouth daily         Reviewed and updated as needed this visit by clinical staff  Tobacco  Allergies  Meds   Med Hx  Surg Hx  Fam Hx  Soc Hx        Reviewed and updated as needed this visit by Provider                    Review of Systems   Constitutional: Negative for chills and fever.   HENT: Negative for congestion, ear pain, hearing loss and sore throat.    Eyes: Negative for pain and visual disturbance.   Respiratory: Negative for cough and shortness of breath.    Cardiovascular: Negative for chest pain, palpitations and peripheral edema.   Gastrointestinal: Negative for abdominal pain, constipation, diarrhea, heartburn, hematochezia and nausea.   Genitourinary: Negative for discharge, dysuria,  "frequency, genital sores, hematuria, impotence and urgency.   Musculoskeletal: Negative for arthralgias, joint swelling and myalgias.   Skin: Negative for rash.   Neurological: Negative for dizziness, weakness, headaches and paresthesias.   Psychiatric/Behavioral: Negative for mood changes. The patient is not nervous/anxious.        OBJECTIVE:   /62 (BP Location: Right arm, Patient Position: Sitting, Cuff Size: Adult Large)   Pulse (!) 48   Temp 96.8  F (36  C) (Tympanic)   Resp 20   Ht 1.915 m (6' 3.39\")   Wt 111.8 kg (246 lb 6.4 oz)   SpO2 97%   BMI 30.48 kg/m      Physical Exam  GENERAL: healthy, alert and no distress  EYES: Eyes grossly normal to inspection, PERRL and conjunctivae and sclerae normal  HENT: ear canals and TM's normal, nose and mouth without ulcers or lesions  NECK: no adenopathy, no asymmetry, masses, or scars and thyroid normal to palpation  RESP: lungs clear to auscultation - no rales, rhonchi or wheezes  CV: regular rate and rhythm, normal S1 S2, no S3 or S4, no murmur, click or rub, no peripheral edema and peripheral pulses strong  ABDOMEN: soft, nontender, no hepatosplenomegaly, no masses and bowel sounds normal  MS: no gross musculoskeletal defects noted, no edema  SKIN: no suspicious lesions or rashes  NEURO: Normal strength and tone, mentation intact and speech normal  PSYCH: mentation appears normal, affect normal/bright    ASSESSMENT/PLAN:   (Z00.00) Routine general medical examination at a health care facility  (primary encounter diagnosis)    (R00.1) Sinus bradycardia  Comment:    Bradycardia noted on exam-resting heart rate less than 50.  History of bradycardia since adolescence.  Denies dizziness syncope chest pain shortness of breath.  Exercises regularly without symptoms.  EKG obtained today reviewed: Sinus bradycardia.  Discussed, no additional work-up  Plan: EKG 12-lead complete w/read - Clinics          (Z12.11) Screen for colon cancer  Comment: due for " "colonoscopy  Plan: GASTROENTEROLOGY ADULT REF PROCEDURE ONLY          (Z13.6) CARDIOVASCULAR SCREENING; LDL GOAL LESS THAN 160  Comment:   Plan: Lipid panel reflex to direct LDL Fasting,         Comprehensive metabolic panel (BMP + Alb, Alk         Phos, ALT, AST, Total. Bili, TP)          (Z12.5) Screening for prostate cancer  Comment:   Plan: PSA, screen          (Z23) Need for diphtheria-tetanus-pertussis (Tdap) vaccine  Comment:   Plan: TDAP VACCINE (Adacel, Boostrix)  [[id number]]          Patient has been advised of split billing requirements and indicates understanding: Yes     COUNSELING:   Reviewed preventive health counseling, as reflected in patient instructions       Regular exercise       Healthy diet/nutrition       Colon cancer screening       Prostate cancer screening    Estimated body mass index is 30.48 kg/m  as calculated from the following:    Height as of this encounter: 1.915 m (6' 3.39\").    Weight as of this encounter: 111.8 kg (246 lb 6.4 oz).     Weight management plan: Discussed healthy diet and exercise guidelines    He reports that he has never smoked. He has never used smokeless tobacco.      Counseling Resources:  ATP IV Guidelines  Pooled Cohorts Equation Calculator  FRAX Risk Assessment  ICSI Preventive Guidelines  Dietary Guidelines for Americans, 2010  USDA's MyPlate  ASA Prophylaxis  Lung CA Screening    Rodney Ribeiro MD  [facility]  3  "

## 2021-05-27 NOTE — PATIENT INSTRUCTIONS
Schedule fasting labdraw  You will be contacted regarding colonoscopy    Preventive Health Recommendations  Male Ages 50 - 64    Yearly exam:             See your health care provider every year in order to  o   Review health changes.   o   Discuss preventive care.    o   Review your medicines if your doctor has prescribed any.     Have a cholesterol test every 5 years, or more frequently if you are at risk for high cholesterol/heart disease.     Have a diabetes test (fasting glucose) every three years. If you are at risk for diabetes, you should have this test more often.     Have a colonoscopy at age 50, or have a yearly FIT test (stool test). These exams will check for colon cancer.      Talk with your health care provider about whether or not a prostate cancer screening test (PSA) is right for you.    You should be tested each year for STDs (sexually transmitted diseases), if you re at risk.     Shots: Get a flu shot each year. Get a tetanus shot every 10 years.     Nutrition:    Eat at least 5 servings of fruits and vegetables daily.     Eat whole-grain bread, whole-wheat pasta and brown rice instead of white grains and rice.     Get adequate Calcium and Vitamin D.     Lifestyle    Exercise for at least 150 minutes a week (30 minutes a day, 5 days a week). This will help you control your weight and prevent disease.     Limit alcohol to one drink per day.     No smoking.     Wear sunscreen to prevent skin cancer.     See your dentist every six months for an exam and cleaning.     See your eye doctor every 1 to 2 years.

## 2021-05-29 PROBLEM — R00.1 SINUS BRADYCARDIA: Status: ACTIVE | Noted: 2021-05-29

## 2021-06-11 DIAGNOSIS — Z11.4 SCREENING FOR HIV (HUMAN IMMUNODEFICIENCY VIRUS): ICD-10-CM

## 2021-06-11 DIAGNOSIS — Z11.59 NEED FOR HEPATITIS C SCREENING TEST: ICD-10-CM

## 2021-06-11 DIAGNOSIS — Z12.11 SCREEN FOR COLON CANCER: ICD-10-CM

## 2021-06-11 DIAGNOSIS — Z12.5 SCREENING FOR PROSTATE CANCER: ICD-10-CM

## 2021-06-11 DIAGNOSIS — Z13.6 CARDIOVASCULAR SCREENING; LDL GOAL LESS THAN 160: ICD-10-CM

## 2021-06-11 PROCEDURE — 36415 COLL VENOUS BLD VENIPUNCTURE: CPT | Performed by: INTERNAL MEDICINE

## 2021-06-11 PROCEDURE — G0103 PSA SCREENING: HCPCS | Performed by: INTERNAL MEDICINE

## 2021-06-11 PROCEDURE — 80053 COMPREHEN METABOLIC PANEL: CPT | Performed by: INTERNAL MEDICINE

## 2021-06-11 PROCEDURE — 80061 LIPID PANEL: CPT | Performed by: INTERNAL MEDICINE

## 2021-06-11 PROCEDURE — 87389 HIV-1 AG W/HIV-1&-2 AB AG IA: CPT | Performed by: INTERNAL MEDICINE

## 2021-06-11 PROCEDURE — 86803 HEPATITIS C AB TEST: CPT | Performed by: INTERNAL MEDICINE

## 2021-06-12 LAB
ALBUMIN SERPL-MCNC: 4.6 G/DL (ref 3.4–5)
ALP SERPL-CCNC: 64 U/L (ref 40–150)
ALT SERPL W P-5'-P-CCNC: 35 U/L (ref 0–70)
ANION GAP SERPL CALCULATED.3IONS-SCNC: 3 MMOL/L (ref 3–14)
AST SERPL W P-5'-P-CCNC: 19 U/L (ref 0–45)
BILIRUB SERPL-MCNC: 0.9 MG/DL (ref 0.2–1.3)
BUN SERPL-MCNC: 20 MG/DL (ref 7–30)
CALCIUM SERPL-MCNC: 9.3 MG/DL (ref 8.5–10.1)
CHLORIDE SERPL-SCNC: 108 MMOL/L (ref 94–109)
CHOLEST SERPL-MCNC: 186 MG/DL
CO2 SERPL-SCNC: 30 MMOL/L (ref 20–32)
CREAT SERPL-MCNC: 0.9 MG/DL (ref 0.66–1.25)
GFR SERPL CREATININE-BSD FRML MDRD: >90 ML/MIN/{1.73_M2}
GLUCOSE SERPL-MCNC: 67 MG/DL (ref 70–99)
HDLC SERPL-MCNC: 68 MG/DL
LDLC SERPL CALC-MCNC: 104 MG/DL
NONHDLC SERPL-MCNC: 118 MG/DL
POTASSIUM SERPL-SCNC: 4.8 MMOL/L (ref 3.4–5.3)
PROT SERPL-MCNC: 7.3 G/DL (ref 6.8–8.8)
PSA SERPL-ACNC: 0.57 UG/L (ref 0–4)
SODIUM SERPL-SCNC: 141 MMOL/L (ref 133–144)
TRIGL SERPL-MCNC: 71 MG/DL

## 2021-06-13 LAB
HCV AB SERPL QL IA: NONREACTIVE
HIV 1+2 AB+HIV1 P24 AG SERPL QL IA: NONREACTIVE

## 2021-06-21 DIAGNOSIS — N20.0 KIDNEY STONE: Primary | ICD-10-CM

## 2021-08-13 ENCOUNTER — TRANSFERRED RECORDS (OUTPATIENT)
Dept: HEALTH INFORMATION MANAGEMENT | Facility: CLINIC | Age: 51
End: 2021-08-13

## 2021-09-10 ENCOUNTER — TELEPHONE (OUTPATIENT)
Dept: UROLOGY | Facility: CLINIC | Age: 51
End: 2021-09-10

## 2021-09-10 NOTE — TELEPHONE ENCOUNTER
----- Message from Chana Mohan, EMT sent at 9/10/2021  2:10 PM CDT -----  Regarding: KUB needed  This patient was supposed to have a KUB prior to his appointment on Monday 9/13.    Thanks,    Chana

## 2021-09-13 ENCOUNTER — TELEPHONE (OUTPATIENT)
Dept: UROLOGY | Facility: CLINIC | Age: 51
End: 2021-09-13

## 2021-09-17 ENCOUNTER — ANCILLARY PROCEDURE (OUTPATIENT)
Dept: GENERAL RADIOLOGY | Facility: CLINIC | Age: 51
End: 2021-09-17
Attending: UROLOGY
Payer: COMMERCIAL

## 2021-09-17 DIAGNOSIS — N20.0 KIDNEY STONE: ICD-10-CM

## 2021-09-17 PROCEDURE — 74019 RADEX ABDOMEN 2 VIEWS: CPT | Performed by: RADIOLOGY

## 2021-09-29 ENCOUNTER — VIRTUAL VISIT (OUTPATIENT)
Dept: UROLOGY | Facility: CLINIC | Age: 51
End: 2021-09-29
Payer: COMMERCIAL

## 2021-09-29 VITALS — BODY MASS INDEX: 29.83 KG/M2 | WEIGHT: 245 LBS | HEIGHT: 76 IN

## 2021-09-29 DIAGNOSIS — N20.0 NEPHROLITHIASIS: ICD-10-CM

## 2021-09-29 DIAGNOSIS — R35.1 NOCTURIA: Primary | ICD-10-CM

## 2021-09-29 PROCEDURE — 99214 OFFICE O/P EST MOD 30 MIN: CPT | Mod: 95 | Performed by: UROLOGY

## 2021-09-29 ASSESSMENT — MIFFLIN-ST. JEOR: SCORE: 2062.81

## 2021-09-29 ASSESSMENT — PAIN SCALES - GENERAL: PAINLEVEL: NO PAIN (0)

## 2021-09-29 NOTE — LETTER
9/29/2021       RE: Riley Lagos  [address] [address]     Dear Colleague,    Thank you for referring your patient, Riley Lagos, to the [facility] [place] [facility]. Please see a copy of my visit note below.    *PT WILL MEET YOU IN MYCHART*    Jb is a 51 year old who is being evaluated via a billable video visit.      How would you like to obtain your AVS? MyChart  If the video visit is dropped, the invitation should be resent by: Text to cell phone: [phone]  Will anyone else be joining your video visit? No        Office Visit Note  [facility]  [phone]    UROLOGIC DIAGNOSES:   History of kidney stones    CURRENT INTERVENTIONS:   Prior ureteroscopy    HISTORY:   Jb returns for virtual visit today for kidney stone follow-up.  In the past year he thinks he may have had one small episode with pain from a kidney stone but otherwise has done well with no symptoms.  He had a KUB performed 2 weeks ago that showed no evidence of stones.    He had questions about nocturia today.  He says he has developed nocturia in his 50s.  He usually gets up once or twice a night to urinate, whereas before he did not urinate at all at night.  He reports normal urinary stream.  No urgency or frequency during the day.  No history of gross hematuria or infections.  His PSA is low at 0.57.      PAST MEDICAL HISTORY:   Past Medical History:   Diagnosis Date     Allergic rhinitis      Kidney stone      Pneumonia 04/2020       PAST SURGICAL HISTORY:   Past Surgical History:   Procedure Laterality Date     APPENDECTOMY OPEN       EYE SURGERY      lasix     KNEE SURGERY      left knee     LASER HOLMIUM LITHOTRIPSY URETER(S), INSERT STENT, COMBINED Left 6/19/2020    Procedure: Cystoscopy, left retrograde pyelogram, interpretation of fluoroscopic images, left ureteroscopy with holmium lithotripsy and stone basketing,  placement of A 5 x 28 double-J left ureteral stent.;  Surgeon: Andrew Chavarria MD;  Location: [facility] OR     ORTHOPEDIC SURGERY       TESTICLE SURGERY       VASECTOMY         FAMILY HISTORY:   Family History   Problem Relation Age of Onset     Prostate Cancer Father         age 65     Diabetes Mother      Heart Failure Paternal Grandmother      Colon Cancer No family hx of        SOCIAL HISTORY:   Social History     Tobacco Use     Smoking status: Never Smoker     Smokeless tobacco: Never Used   Substance Use Topics     Alcohol use: Yes     Alcohol/week: 3.3 standard drinks     Types: 4 Standard drinks or equivalent per week     Comment: 2-4 drinks per week       REVIEW OF SYSTEMS:  Skin: No rash, pruritis, or skin pigmentation  Eyes: No changes in vision  Ears/Nose/Throat: No changes in hearing, no nosebleeds  Respiratory: No shortness of breath, dyspnea on exertion, cough, or hemoptysis  Cardiovascular: No chest pain or palpitations  Gastrointestinal: No diarrhea or constipation. No abdominal pain. No hematochezia  Genitourinary: see HPI  Musculoskeletal: No pain or swelling of joints, normal range of motion  Neurologic: No weakness or tremors  Psychiatric: No recent changes in memory or mood  Hematologic/Lymphatic/Immunologic: No easy bruising or enlarged lymph nodes  Endocrine: No weight gain or loss      PHYSICAL EXAM:    General: Alert and oriented to time, place, and self. In NAD   HEENT: Head AT/NC, EOMI, CN Grossly intact   Lungs: no respiratory distress, or pursed lip breathing   Heart: No obvious jugular venous distension present   Musculoskeltal: Normal movements. Normal appearing musculature  Skin: no suspicious lesions or rashes   Neuro: Alert, oriented, speech and mentation normal; moving all 4 extremities equally.   Psych: affect and mood normal    Imaging: None    Urinalysis: UA RESULTS:  Recent Labs   Lab Test 04/01/20 1927   COLOR Yellow   APPEARANCE Clear   URINEGLC Negative   URINEBILI  Negative   URINEKETONE 60*   SG 1.028   UBLD Moderate*   URINEPH 6.0   PROTEIN 100*   NITRITE Negative   LEUKEST Negative   RBCU >182*   WBCU 2       PSA: 0.57    Post Void Residual:     Other labs: None today      IMPRESSION:  History of kidney stones and nocturia    PLAN:  We discussed his KUB findings.  No evidence of stones.  We discussed prevention methods for future stones.  He will follow-up with me as needed regarding his stones.    We also discussed his nocturia.  I counseled him that it is normal to get up more at night to urinate as we age.  I counseled him that for a man in his 50s 1 time at night can be quite normal.  His PSA is very low so it is unlikely that he has an enlarged prostate.  I discussed conservative measures for reducing nocturia.  If his nocturia does worsen or become bothersome he should come back and see me in the clinic again for a formal exam of his prostate.      Andrew Chavarria M.D.              Video Start Time: 11:40AM  Video-Visit Details    Type of service:  Video Visit    Video End Time:11:49 AM    Originating Location (pt. Location): Home    Distant Location (provider location):  [facility] [place]     Platform used for Video Visit: Rouxbe

## 2021-09-29 NOTE — PROGRESS NOTES
*PT WILL MEET YOU IN [employer]ART*    Jb is a 51 year old who is being evaluated via a billable video visit.      How would you like to obtain your AVS? [employer]  If the video visit is dropped, the invitation should be resent by: Text to cell phone: [phone]  Will anyone else be joining your video visit? No        Office Visit Note  [facility]  [phone]    UROLOGIC DIAGNOSES:   History of kidney stones    CURRENT INTERVENTIONS:   Prior ureteroscopy    HISTORY:   Jb returns for virtual visit today for kidney stone follow-up.  In the past year he thinks he may have had one small episode with pain from a kidney stone but otherwise has done well with no symptoms.  He had a KUB performed 2 weeks ago that showed no evidence of stones.    He had questions about nocturia today.  He says he has developed nocturia in his 50s.  He usually gets up once or twice a night to urinate, whereas before he did not urinate at all at night.  He reports normal urinary stream.  No urgency or frequency during the day.  No history of gross hematuria or infections.  His PSA is low at 0.57.      PAST MEDICAL HISTORY:   Past Medical History:   Diagnosis Date     Allergic rhinitis      Kidney stone      Pneumonia 04/2020       PAST SURGICAL HISTORY:   Past Surgical History:   Procedure Laterality Date     APPENDECTOMY OPEN       EYE SURGERY      lasix     KNEE SURGERY      left knee     LASER HOLMIUM LITHOTRIPSY URETER(S), INSERT STENT, COMBINED Left 6/19/2020    Procedure: Cystoscopy, left retrograde pyelogram, interpretation of fluoroscopic images, left ureteroscopy with holmium lithotripsy and stone basketing, placement of A 5 x 28 double-J left ureteral stent.;  Surgeon: Andrew Chavarria MD;  Location:  OR     ORTHOPEDIC SURGERY       TESTICLE SURGERY       VASECTOMY         FAMILY HISTORY:   Family History   Problem Relation Age of Onset     Prostate Cancer Father         age 65     Diabetes Mother      Heart  Failure Paternal Grandmother      Colon Cancer No family hx of        SOCIAL HISTORY:   Social History     Tobacco Use     Smoking status: Never Smoker     Smokeless tobacco: Never Used   Substance Use Topics     Alcohol use: Yes     Alcohol/week: 3.3 standard drinks     Types: 4 Standard drinks or equivalent per week     Comment: 2-4 drinks per week       REVIEW OF SYSTEMS:  Skin: No rash, pruritis, or skin pigmentation  Eyes: No changes in vision  Ears/Nose/Throat: No changes in hearing, no nosebleeds  Respiratory: No shortness of breath, dyspnea on exertion, cough, or hemoptysis  Cardiovascular: No chest pain or palpitations  Gastrointestinal: No diarrhea or constipation. No abdominal pain. No hematochezia  Genitourinary: see HPI  Musculoskeletal: No pain or swelling of joints, normal range of motion  Neurologic: No weakness or tremors  Psychiatric: No recent changes in memory or mood  Hematologic/Lymphatic/Immunologic: No easy bruising or enlarged lymph nodes  Endocrine: No weight gain or loss      PHYSICAL EXAM:    General: Alert and oriented to time, place, and self. In NAD   HEENT: Head AT/NC, EOMI, CN Grossly intact   Lungs: no respiratory distress, or pursed lip breathing   Heart: No obvious jugular venous distension present   Musculoskeltal: Normal movements. Normal appearing musculature  Skin: no suspicious lesions or rashes   Neuro: Alert, oriented, speech and mentation normal; moving all 4 extremities equally.   Psych: affect and mood normal    Imaging: None    Urinalysis: UA RESULTS:  Recent Labs   Lab Test 04/01/20 1927   COLOR Yellow   APPEARANCE Clear   URINEGLC Negative   URINEBILI Negative   URINEKETONE 60*   SG 1.028   UBLD Moderate*   URINEPH 6.0   PROTEIN 100*   NITRITE Negative   LEUKEST Negative   RBCU >182*   WBCU 2       PSA: 0.57    Post Void Residual:     Other labs: None today      IMPRESSION:  History of kidney stones and nocturia    PLAN:  We discussed his KUB findings.  No evidence  of stones.  We discussed prevention methods for future stones.  He will follow-up with me as needed regarding his stones.    We also discussed his nocturia.  I counseled him that it is normal to get up more at night to urinate as we age.  I counseled him that for a man in his 50s 1 time at night can be quite normal.  His PSA is very low so it is unlikely that he has an enlarged prostate.  I discussed conservative measures for reducing nocturia.  If his nocturia does worsen or become bothersome he should come back and see me in the clinic again for a formal exam of his prostate.      Andrew Chavarria M.D.              Video Start Time: 11:40AM  Video-Visit Details    Type of service:  Video Visit    Video End Time:11:49 AM    Originating Location (pt. Location): Home    Distant Location (provider location):  [facility] [place]     Platform used for Video Visit: AddSearch

## 2021-10-22 ENCOUNTER — IMMUNIZATION (OUTPATIENT)
Dept: PEDIATRICS | Facility: CLINIC | Age: 51
End: 2021-10-22
Payer: COMMERCIAL

## 2021-10-22 DIAGNOSIS — Z23 NEED FOR VACCINATION: ICD-10-CM

## 2021-10-22 DIAGNOSIS — Z23 NEED FOR PROPHYLACTIC VACCINATION AND INOCULATION AGAINST INFLUENZA: Primary | ICD-10-CM

## 2021-10-22 PROCEDURE — 90471 IMMUNIZATION ADMIN: CPT

## 2021-10-22 PROCEDURE — 90472 IMMUNIZATION ADMIN EACH ADD: CPT

## 2021-10-22 PROCEDURE — 90750 HZV VACC RECOMBINANT IM: CPT

## 2021-10-22 PROCEDURE — 90682 RIV4 VACC RECOMBINANT DNA IM: CPT

## 2021-12-10 ENCOUNTER — IMMUNIZATION (OUTPATIENT)
Dept: NURSING | Facility: CLINIC | Age: 51
End: 2021-12-10
Payer: COMMERCIAL

## 2021-12-10 PROCEDURE — 91306 COVID-19,PF,MODERNA (18+ YRS BOOSTER .25ML): CPT

## 2021-12-10 PROCEDURE — 0064A COVID-19,PF,MODERNA (18+ YRS BOOSTER .25ML): CPT

## 2022-01-21 ENCOUNTER — ALLIED HEALTH/NURSE VISIT (OUTPATIENT)
Dept: PEDIATRICS | Facility: CLINIC | Age: 52
End: 2022-01-21
Payer: COMMERCIAL

## 2022-01-21 DIAGNOSIS — Z23 NEED FOR VACCINATION: Primary | ICD-10-CM

## 2022-01-21 PROCEDURE — 90471 IMMUNIZATION ADMIN: CPT

## 2022-01-21 PROCEDURE — 90750 HZV VACC RECOMBINANT IM: CPT

## 2022-07-31 ENCOUNTER — HEALTH MAINTENANCE LETTER (OUTPATIENT)
Age: 52
End: 2022-07-31

## 2022-10-15 ENCOUNTER — HEALTH MAINTENANCE LETTER (OUTPATIENT)
Age: 52
End: 2022-10-15

## 2022-11-10 SDOH — ECONOMIC STABILITY: INCOME INSECURITY: HOW HARD IS IT FOR YOU TO PAY FOR THE VERY BASICS LIKE FOOD, HOUSING, MEDICAL CARE, AND HEATING?: NOT HARD AT ALL

## 2022-11-10 SDOH — ECONOMIC STABILITY: FOOD INSECURITY: WITHIN THE PAST 12 MONTHS, YOU WORRIED THAT YOUR FOOD WOULD RUN OUT BEFORE YOU GOT MONEY TO BUY MORE.: NEVER TRUE

## 2022-11-10 SDOH — ECONOMIC STABILITY: FOOD INSECURITY: WITHIN THE PAST 12 MONTHS, THE FOOD YOU BOUGHT JUST DIDN'T LAST AND YOU DIDN'T HAVE MONEY TO GET MORE.: NEVER TRUE

## 2022-11-10 SDOH — ECONOMIC STABILITY: TRANSPORTATION INSECURITY
IN THE PAST 12 MONTHS, HAS THE LACK OF TRANSPORTATION KEPT YOU FROM MEDICAL APPOINTMENTS OR FROM GETTING MEDICATIONS?: NO

## 2022-11-10 SDOH — HEALTH STABILITY: PHYSICAL HEALTH: ON AVERAGE, HOW MANY MINUTES DO YOU ENGAGE IN EXERCISE AT THIS LEVEL?: 30 MIN

## 2022-11-10 SDOH — ECONOMIC STABILITY: INCOME INSECURITY: IN THE LAST 12 MONTHS, WAS THERE A TIME WHEN YOU WERE NOT ABLE TO PAY THE MORTGAGE OR RENT ON TIME?: NO

## 2022-11-10 SDOH — HEALTH STABILITY: PHYSICAL HEALTH: ON AVERAGE, HOW MANY DAYS PER WEEK DO YOU ENGAGE IN MODERATE TO STRENUOUS EXERCISE (LIKE A BRISK WALK)?: 5 DAYS

## 2022-11-10 SDOH — ECONOMIC STABILITY: TRANSPORTATION INSECURITY
IN THE PAST 12 MONTHS, HAS LACK OF TRANSPORTATION KEPT YOU FROM MEETINGS, WORK, OR FROM GETTING THINGS NEEDED FOR DAILY LIVING?: NO

## 2022-11-10 ASSESSMENT — ENCOUNTER SYMPTOMS
HEADACHES: 0
JOINT SWELLING: 0
NERVOUS/ANXIOUS: 0
MYALGIAS: 0
SHORTNESS OF BREATH: 0
ARTHRALGIAS: 0
COUGH: 1
CHILLS: 0
FREQUENCY: 0
DYSURIA: 0
HEARTBURN: 0
NAUSEA: 0
PARESTHESIAS: 0
FEVER: 0
ABDOMINAL PAIN: 0
WEAKNESS: 0
DIARRHEA: 0
DIZZINESS: 0
HEMATOCHEZIA: 0
PALPITATIONS: 0
SORE THROAT: 0
CONSTIPATION: 0
HEMATURIA: 0
EYE PAIN: 0

## 2022-11-10 ASSESSMENT — SOCIAL DETERMINANTS OF HEALTH (SDOH)
HOW OFTEN DO YOU ATTEND CHURCH OR RELIGIOUS SERVICES?: PATIENT DECLINED
IN A TYPICAL WEEK, HOW MANY TIMES DO YOU TALK ON THE PHONE WITH FAMILY, FRIENDS, OR NEIGHBORS?: PATIENT DECLINED
DO YOU BELONG TO ANY CLUBS OR ORGANIZATIONS SUCH AS CHURCH GROUPS UNIONS, FRATERNAL OR ATHLETIC GROUPS, OR SCHOOL GROUPS?: PATIENT DECLINED
HOW OFTEN DO YOU GET TOGETHER WITH FRIENDS OR RELATIVES?: ONCE A WEEK

## 2022-11-10 ASSESSMENT — LIFESTYLE VARIABLES
HOW OFTEN DO YOU HAVE SIX OR MORE DRINKS ON ONE OCCASION: LESS THAN MONTHLY
HOW MANY STANDARD DRINKS CONTAINING ALCOHOL DO YOU HAVE ON A TYPICAL DAY: 1 OR 2
SKIP TO QUESTIONS 9-10: 0
AUDIT-C TOTAL SCORE: 4
HOW OFTEN DO YOU HAVE A DRINK CONTAINING ALCOHOL: 2-3 TIMES A WEEK

## 2022-11-17 ENCOUNTER — OFFICE VISIT (OUTPATIENT)
Dept: PEDIATRICS | Facility: CLINIC | Age: 52
End: 2022-11-17
Payer: COMMERCIAL

## 2022-11-17 VITALS
RESPIRATION RATE: 16 BRPM | WEIGHT: 250.2 LBS | HEIGHT: 76 IN | BODY MASS INDEX: 30.47 KG/M2 | SYSTOLIC BLOOD PRESSURE: 132 MMHG | TEMPERATURE: 97.1 F | HEART RATE: 50 BPM | OXYGEN SATURATION: 99 % | DIASTOLIC BLOOD PRESSURE: 77 MMHG

## 2022-11-17 DIAGNOSIS — N52.9 ERECTILE DYSFUNCTION, UNSPECIFIED ERECTILE DYSFUNCTION TYPE: ICD-10-CM

## 2022-11-17 DIAGNOSIS — Z00.00 ROUTINE GENERAL MEDICAL EXAMINATION AT A HEALTH CARE FACILITY: Primary | ICD-10-CM

## 2022-11-17 DIAGNOSIS — D22.9 ATYPICAL NEVI: ICD-10-CM

## 2022-11-17 DIAGNOSIS — R00.1 SINUS BRADYCARDIA: ICD-10-CM

## 2022-11-17 PROBLEM — N20.1 URETERAL STONE: Status: RESOLVED | Noted: 2020-05-28 | Resolved: 2022-11-17

## 2022-11-17 PROBLEM — D48.5 NEOPLASM OF UNCERTAIN BEHAVIOR OF SKIN: Status: RESOLVED | Noted: 2017-06-21 | Resolved: 2022-11-17

## 2022-11-17 PROCEDURE — 99396 PREV VISIT EST AGE 40-64: CPT | Performed by: INTERNAL MEDICINE

## 2022-11-17 PROCEDURE — 99213 OFFICE O/P EST LOW 20 MIN: CPT | Mod: 25 | Performed by: INTERNAL MEDICINE

## 2022-11-17 RX ORDER — SILDENAFIL 50 MG/1
50 TABLET, FILM COATED ORAL DAILY PRN
Qty: 60 TABLET | Refills: 6 | Status: SHIPPED | OUTPATIENT
Start: 2022-11-17 | End: 2024-04-10

## 2022-11-17 ASSESSMENT — ENCOUNTER SYMPTOMS
MYALGIAS: 0
NAUSEA: 0
ARTHRALGIAS: 0
DYSURIA: 0
HEMATOCHEZIA: 0
PARESTHESIAS: 0
DIARRHEA: 0
HEMATURIA: 0
SORE THROAT: 0
ABDOMINAL PAIN: 0
FREQUENCY: 0
HEARTBURN: 0
HEADACHES: 0
PALPITATIONS: 0
WEAKNESS: 0
DIZZINESS: 0
JOINT SWELLING: 0
CONSTIPATION: 0
EYE PAIN: 0
FEVER: 0
CHILLS: 0
SHORTNESS OF BREATH: 0
NERVOUS/ANXIOUS: 0
COUGH: 1

## 2022-11-17 ASSESSMENT — PAIN SCALES - GENERAL: PAINLEVEL: NO PAIN (0)

## 2022-11-17 NOTE — PROGRESS NOTES
SUBJECTIVE:   CC: Jb is an 52 year old who presents for preventative health visit.     Patient has been advised of split billing requirements and indicates understanding: Yes  Healthy Habits:     Getting at least 3 servings of Calcium per day:  Yes    Bi-annual eye exam:  Yes    Dental care twice a year:  Yes    Sleep apnea or symptoms of sleep apnea:  Excessive snoring    Diet:  Regular (no restrictions)    Frequency of exercise:  4-5 days/week    Duration of exercise:  15-30 minutes    Taking medications regularly:  Yes    Medication side effects:  None    PHQ-2 Total Score: 1    Additional concerns today:  No          Today's PHQ-2 Score:   PHQ-2 ( 1999 Pfizer) 11/10/2022   Q1: Little interest or pleasure in doing things 0   Q2: Feeling down, depressed or hopeless 1   PHQ-2 Score 1   PHQ-2 Total Score (12-17 Years)- Positive if 3 or more points; Administer PHQ-A if positive -   Q1: Little interest or pleasure in doing things Not at all   Q2: Feeling down, depressed or hopeless Several days   PHQ-2 Score 1       Have you ever done Advance Care Planning? (For example, a Health Directive, POLST, or a discussion with a medical provider or your loved ones about your wishes): No, advance care planning information given to patient to review.  Patient declined advance care planning discussion at this time.    Social History     Tobacco Use     Smoking status: Never     Smokeless tobacco: Never   Substance Use Topics     Alcohol use: Yes     Alcohol/week: 3.3 standard drinks     Types: 4 Standard drinks or equivalent per week     Comment: 2-4 drinks per week         Alcohol Use 11/10/2022   Prescreen: >3 drinks/day or >7 drinks/week? No   Prescreen: >3 drinks/day or >7 drinks/week? -       Last PSA:   PSA   Date Value Ref Range Status   06/11/2021 0.57 0 - 4 ug/L Final     Comment:     Assay Method:  Chemiluminescence using Siemens Vista analyzer       Reviewed orders with patient. Reviewed health maintenance and  updated orders accordingly - Yes  Patient Active Problem List   Diagnosis     Cherry angioma     Multiple nevi     Sinus bradycardia     Past Surgical History:   Procedure Laterality Date     APPENDECTOMY OPEN       EYE SURGERY      lasix     KNEE SURGERY      left knee     LASER HOLMIUM LITHOTRIPSY URETER(S), INSERT STENT, COMBINED Left 6/19/2020    Procedure: Cystoscopy, left retrograde pyelogram, interpretation of fluoroscopic images, left ureteroscopy with holmium lithotripsy and stone basketing, placement of A 5 x 28 double-J left ureteral stent.;  Surgeon: Andrew Chavarria MD;  Location: [facility] OR     ORTHOPEDIC SURGERY       TESTICLE SURGERY       VASECTOMY         Social History     Tobacco Use     Smoking status: Never     Smokeless tobacco: Never   Substance Use Topics     Alcohol use: Yes     Alcohol/week: 3.3 standard drinks     Types: 4 Standard drinks or equivalent per week     Comment: 2-4 drinks per week     Family History   Problem Relation Age of Onset     Prostate Cancer Father         age 65     Diabetes Mother      Heart Failure Paternal Grandmother      Colon Cancer No family hx of          Current Outpatient Medications   Medication Sig Dispense Refill     fluticasone (FLONASE) 50 MCG/ACT nasal spray Spray 1 spray into both nostrils daily        loratadine 10 MG capsule Take 10 mg by mouth daily.       Multiple Vitamins-Minerals (MULTIVITAL) TABS Take 1 tablet by mouth daily       sildenafil (VIAGRA) 50 MG tablet Take 1 tablet (50 mg) by mouth daily as needed (ED) 60 tablet 6       Reviewed and updated as needed this visit by clinical staff   Tobacco  Allergies  Meds              Reviewed and updated as needed this visit by Provider                     Review of Systems   Constitutional: Negative for chills and fever.   HENT: Positive for congestion. Negative for ear pain, hearing loss and sore throat.    Eyes: Negative for pain and visual disturbance.   Respiratory: Positive for cough.  "Negative for shortness of breath.    Cardiovascular: Negative for chest pain, palpitations and peripheral edema.   Gastrointestinal: Negative for abdominal pain, constipation, diarrhea, heartburn, hematochezia and nausea.   Genitourinary: Positive for impotence. Negative for dysuria, frequency, genital sores, hematuria, penile discharge and urgency.   Musculoskeletal: Negative for arthralgias, joint swelling and myalgias.   Skin: Negative for rash.   Neurological: Negative for dizziness, weakness, headaches and paresthesias.   Psychiatric/Behavioral: Negative for mood changes. The patient is not nervous/anxious.          OBJECTIVE:   /77   Pulse 50   Temp 97.1  F (36.2  C) (Tympanic)   Resp 16   Ht 1.924 m (6' 3.75\")   Wt 113.5 kg (250 lb 3.2 oz)   SpO2 99%   BMI 30.66 kg/m      Physical Exam  GENERAL: healthy, alert and no distress  EYES: Eyes grossly normal to inspection, PERRL and conjunctivae and sclerae normal  HENT: ear canals and TM's normal, nose and mouth without ulcers or lesions  NECK: no adenopathy, no asymmetry, masses, or scars and thyroid normal to palpation  RESP: lungs clear to auscultation - no rales, rhonchi or wheezes  CV: regular rate and rhythm, normal S1 S2, no S3 or S4, no murmur, click or rub, no peripheral edema and peripheral pulses strong  ABDOMEN: soft, nontender, no hepatosplenomegaly, no masses and bowel sounds normal  MS: no gross musculoskeletal defects noted, no edema  SKIN: no suspicious lesions or rashes  NEURO: Normal strength and tone, mentation intact and speech normal  PSYCH: mentation appears normal, affect normal/bright        ASSESSMENT/PLAN:   (Z00.00) Routine general medical examination at a health care facility  (primary encounter diagnosis)  Healthy.  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  Discussed stress management and work/life balance    (R00.1) Sinus bradycardia  Comment:   Plan: Longstanding " "resting bradycardia.  Asymptomatic.  Good exercise tolerance    (N52.9) Erectile dysfunction, unspecified erectile dysfunction type  Comment: Recent issues with erectile dysfunction over the past few years requests sildenafil.  Prescription for sildenafil-side effects reviewed.  Recommended good Rx kvng to reduce cost.  Plan: sildenafil (VIAGRA) 50 MG tablet          (D22.9) Atypical nevi  Comment: Requests dermatology referral for annual exam.  Plan: Adult Dermatology Referral              COUNSELING:   Reviewed preventive health counseling, as reflected in patient instructions       Regular exercise       Healthy diet/nutrition       Colorectal cancer screening       Prostate cancer screening      BMI:   Estimated body mass index is 30.66 kg/m  as calculated from the following:    Height as of this encounter: 1.924 m (6' 3.75\").    Weight as of this encounter: 113.5 kg (250 lb 3.2 oz).   Weight management plan: Discussed healthy diet and exercise guidelines      He reports that he has never smoked. He has never used smokeless tobacco.            Rodney Ribeiro MD  [facility]  "

## 2023-10-18 ENCOUNTER — PATIENT OUTREACH (OUTPATIENT)
Dept: CARE COORDINATION | Facility: CLINIC | Age: 53
End: 2023-10-18
Payer: COMMERCIAL

## 2023-11-01 ENCOUNTER — PATIENT OUTREACH (OUTPATIENT)
Dept: CARE COORDINATION | Facility: CLINIC | Age: 53
End: 2023-11-01
Payer: COMMERCIAL

## 2024-01-07 ENCOUNTER — HEALTH MAINTENANCE LETTER (OUTPATIENT)
Age: 54
End: 2024-01-07

## 2024-04-09 SDOH — HEALTH STABILITY: PHYSICAL HEALTH: ON AVERAGE, HOW MANY DAYS PER WEEK DO YOU ENGAGE IN MODERATE TO STRENUOUS EXERCISE (LIKE A BRISK WALK)?: 5 DAYS

## 2024-04-09 SDOH — HEALTH STABILITY: PHYSICAL HEALTH: ON AVERAGE, HOW MANY MINUTES DO YOU ENGAGE IN EXERCISE AT THIS LEVEL?: 20 MIN

## 2024-04-09 ASSESSMENT — SOCIAL DETERMINANTS OF HEALTH (SDOH): HOW OFTEN DO YOU GET TOGETHER WITH FRIENDS OR RELATIVES?: ONCE A WEEK

## 2024-04-10 ENCOUNTER — OFFICE VISIT (OUTPATIENT)
Dept: PEDIATRICS | Facility: CLINIC | Age: 54
End: 2024-04-10
Payer: COMMERCIAL

## 2024-04-10 VITALS
HEIGHT: 75 IN | OXYGEN SATURATION: 98 % | TEMPERATURE: 97 F | DIASTOLIC BLOOD PRESSURE: 60 MMHG | BODY MASS INDEX: 31.83 KG/M2 | SYSTOLIC BLOOD PRESSURE: 102 MMHG | HEART RATE: 53 BPM | RESPIRATION RATE: 16 BRPM | WEIGHT: 256 LBS

## 2024-04-10 DIAGNOSIS — Z13.220 LIPID SCREENING: ICD-10-CM

## 2024-04-10 DIAGNOSIS — R63.5 WEIGHT GAIN: ICD-10-CM

## 2024-04-10 DIAGNOSIS — Z00.00 ROUTINE GENERAL MEDICAL EXAMINATION AT A HEALTH CARE FACILITY: Primary | ICD-10-CM

## 2024-04-10 DIAGNOSIS — Z12.5 SCREENING FOR PROSTATE CANCER: ICD-10-CM

## 2024-04-10 LAB
ALBUMIN SERPL BCG-MCNC: 4.8 G/DL (ref 3.5–5.2)
ALP SERPL-CCNC: 69 U/L (ref 40–150)
ALT SERPL W P-5'-P-CCNC: 31 U/L (ref 0–70)
ANION GAP SERPL CALCULATED.3IONS-SCNC: 12 MMOL/L (ref 7–15)
AST SERPL W P-5'-P-CCNC: 21 U/L (ref 0–45)
BILIRUB SERPL-MCNC: 0.6 MG/DL
BUN SERPL-MCNC: 19.6 MG/DL (ref 6–20)
CALCIUM SERPL-MCNC: 9.7 MG/DL (ref 8.6–10)
CHLORIDE SERPL-SCNC: 103 MMOL/L (ref 98–107)
CHOLEST SERPL-MCNC: 213 MG/DL
CREAT SERPL-MCNC: 1.01 MG/DL (ref 0.67–1.17)
DEPRECATED HCO3 PLAS-SCNC: 25 MMOL/L (ref 22–29)
EGFRCR SERPLBLD CKD-EPI 2021: 88 ML/MIN/1.73M2
FASTING STATUS PATIENT QL REPORTED: YES
GLUCOSE SERPL-MCNC: 107 MG/DL (ref 70–99)
HDLC SERPL-MCNC: 65 MG/DL
LDLC SERPL CALC-MCNC: 128 MG/DL
NONHDLC SERPL-MCNC: 148 MG/DL
POTASSIUM SERPL-SCNC: 4.5 MMOL/L (ref 3.4–5.3)
PROT SERPL-MCNC: 7.2 G/DL (ref 6.4–8.3)
PSA SERPL DL<=0.01 NG/ML-MCNC: 0.59 NG/ML (ref 0–3.5)
SHBG SERPL-SCNC: 33 NMOL/L (ref 11–80)
SODIUM SERPL-SCNC: 140 MMOL/L (ref 135–145)
T4 FREE SERPL-MCNC: 0.99 NG/DL (ref 0.9–1.7)
TRIGL SERPL-MCNC: 100 MG/DL
TSH SERPL DL<=0.005 MIU/L-ACNC: 9.94 UIU/ML (ref 0.3–4.2)

## 2024-04-10 PROCEDURE — 99396 PREV VISIT EST AGE 40-64: CPT | Performed by: INTERNAL MEDICINE

## 2024-04-10 PROCEDURE — 80061 LIPID PANEL: CPT | Performed by: INTERNAL MEDICINE

## 2024-04-10 PROCEDURE — G0103 PSA SCREENING: HCPCS | Performed by: INTERNAL MEDICINE

## 2024-04-10 PROCEDURE — 84270 ASSAY OF SEX HORMONE GLOBUL: CPT | Performed by: INTERNAL MEDICINE

## 2024-04-10 PROCEDURE — 80053 COMPREHEN METABOLIC PANEL: CPT | Performed by: INTERNAL MEDICINE

## 2024-04-10 PROCEDURE — 36415 COLL VENOUS BLD VENIPUNCTURE: CPT | Performed by: INTERNAL MEDICINE

## 2024-04-10 PROCEDURE — 84443 ASSAY THYROID STIM HORMONE: CPT | Performed by: INTERNAL MEDICINE

## 2024-04-10 PROCEDURE — 84403 ASSAY OF TOTAL TESTOSTERONE: CPT | Performed by: INTERNAL MEDICINE

## 2024-04-10 PROCEDURE — 84439 ASSAY OF FREE THYROXINE: CPT | Performed by: INTERNAL MEDICINE

## 2024-04-10 ASSESSMENT — PAIN SCALES - GENERAL: PAINLEVEL: NO PAIN (0)

## 2024-04-10 NOTE — PATIENT INSTRUCTIONS
Preventive Care Advice   This is general advice given by our system to help you stay healthy. However, your care team may have specific advice just for you. Please talk to your care team about your preventive care needs.  Nutrition  Eat 5 or more servings of fruits and vegetables each day.  Try wheat bread, brown rice and whole grain pasta (instead of white bread, rice, and pasta).  Get enough calcium and vitamin D. Check the label on foods and aim for 100% of the RDA (recommended daily allowance).  Lifestyle  Exercise at least 150 minutes each week   (30 minutes a day, 5 days a week).  Do muscle strengthening activities 2 days a week. These help control your weight and prevent disease.  No smoking.  Wear sunscreen to prevent skin cancer.  Have a dental exam and cleaning every 6 months.  Yearly exams  See your health care team every year to talk about:  Any changes in your health.  Any medicines your care team has prescribed.  Preventive care, family planning, and ways to prevent chronic diseases.  Shots (vaccines)   HPV shots (up to age 26), if you've never had them before.  Hepatitis B shots (up to age 59), if you've never had them before.  COVID-19 shot: Get this shot when it's due.  Flu shot: Get a flu shot every year.  Tetanus shot: Get a tetanus shot every 10 years.  Pneumococcal, hepatitis A, and RSV shots: Ask your care team if you need these based on your risk.  Shingles shot (for age 50 and up).  General health tests  Diabetes screening:  Starting at age 35, Get screened for diabetes at least every 3 years.  If you are younger than age 35, ask your care team if you should be screened for diabetes.  Cholesterol test: At age 39, start having a cholesterol test every 5 years, or more often if advised.  Bone density scan (DEXA): At age 50, ask your care team if you should have this scan for osteoporosis (brittle bones).  Hepatitis C: Get tested at least once in your life.  STIs (sexually transmitted  infections)  Before age 24: Ask your care team if you should be screened for STIs.  After age 24: Get screened for STIs if you're at risk. You are at risk for STIs (including HIV) if:  You are sexually active with more than one person.  You don't use condoms every time.  You or a partner was diagnosed with a sexually transmitted infection.  If you are at risk for HIV, ask about PrEP medicine to prevent HIV.  Get tested for HIV at least once in your life, whether you are at risk for HIV or not.  Cancer screening tests  Cervical cancer screening: If you have a cervix, begin getting regular cervical cancer screening tests at age 21. Most people who have regular screenings with normal results can stop after age 65. Talk about this with your provider.  Breast cancer scan (mammogram): If you've ever had breasts, begin having regular mammograms starting at age 40. This is a scan to check for breast cancer.  Colon cancer screening: It is important to start screening for colon cancer at age 45.  Have a colonoscopy test every 10 years (or more often if you're at risk) Or, ask your provider about stool tests like a FIT test every year or Cologuard test every 3 years.  To learn more about your testing options, visit: https://www.BO.LT/899938.pdf.  For help making a decision, visit: https://bit.ly/km12864.  Prostate cancer screening test: If you have a prostate and are age 55 to 69, ask your provider if you would benefit from a yearly prostate cancer screening test.  Lung cancer screening: If you are a current or former smoker age 50 to 80, ask your care team if ongoing lung cancer screenings are right for you.  For informational purposes only. Not to replace the advice of your health care provider. Copyright   2023 [employer]. All rights reserved. Clinically reviewed by the [facility]. [employer] [id number] - REV 01/24.

## 2024-04-10 NOTE — PROGRESS NOTES
"Preventive Care Visit  [facility] Ribeiro MD, Internal Medicine - Pediatrics  Apr 10, 2024      Assessment & Plan     (Z00.00) Routine general medical examination at a health care facility  (primary encounter diagnosis)    (R63.5) Weight gain  Comment: Concerns today regarding increased midsection weight since last physical.  States he exercises at least 5 days/week and diet has not changed much.  Additional lab work today-patient requests testosterone level, check TSH.  Provided lab work is normal, did recommend addressing potential dietary issues and tracking calories, working to reduce overall intake and continuing regular exercise  Plan: Testosterone Free and Total, TSH with free T4         reflex          (Z13.220) Lipid screening  Comment:   Plan: Comprehensive metabolic panel (BMP + Alb, Alk         Phos, ALT, AST, Total. Bili, TP), Lipid panel         reflex to direct LDL Fasting          (Z12.5) Screening for prostate cancer  Comment:   Plan: PSA, screen                      BMI  Estimated body mass index is 31.66 kg/m  as calculated from the following:    Height as of this encounter: 1.915 m (6' 3.39\").    Weight as of this encounter: 116.1 kg (256 lb).       Counseling  Appropriate preventive services were discussed with this patient, including applicable screening as appropriate for fall prevention, nutrition, physical activity, Tobacco-use cessation, weight loss and cognition.  Checklist reviewing preventive services available has been given to the patient.  Reviewed patient's diet, addressing concerns and/or questions.           Neena Muhammad is a 54 year old, presenting for the following:  Physical        4/10/2024     9:41 AM   Additional Questions   Roomed by Jennifer Sarah   Accompanied by MONCHO        HPI              4/9/2024   General Health   How would you rate your overall physical health? Good   Feel stress (tense, anxious, or unable to sleep) Not at all         " 4/9/2024   Nutrition   Three or more servings of calcium each day? Yes   Diet: Regular (no restrictions)   How many servings of fruit and vegetables per day? (!) 2-3   How many sweetened beverages each day? 0-1         4/9/2024   Exercise   Days per week of moderate/strenous exercise 5 days   Average minutes spent exercising at this level 20 min         4/9/2024   Social Factors   Frequency of gathering with friends or relatives Once a week   Worry food won't last until get money to buy more No   Food not last or not have enough money for food? No   Do you have housing?  Yes   Are you worried about losing your housing? No   Lack of transportation? No   Unable to get utilities (heat,electricity)? No         4/9/2024   Fall Risk   Fallen 2 or more times in the past year? No   Trouble with walking or balance? No          4/9/2024   Dental   Dentist two times every year? Yes         4/9/2024   TB Screening   Were you born outside of the [place]? No         Today's PHQ-2 Score:       4/9/2024     6:08 PM   PHQ-2 ( 1999 Pfizer)   Q1: Little interest or pleasure in doing things 0   Q2: Feeling down, depressed or hopeless 0   PHQ-2 Score 0   Q1: Little interest or pleasure in doing things Not at all   Q2: Feeling down, depressed or hopeless Not at all   PHQ-2 Score 0           4/9/2024   Substance Use   Alcohol more than 3/day or more than 7/wk No   Do you use any other substances recreationally? (!) ALCOHOL    (!) CANNABIS PRODUCTS     Social History     Tobacco Use    Smoking status: Never    Smokeless tobacco: Never   Vaping Use    Vaping status: Never Used   Substance Use Topics    Alcohol use: Yes     Alcohol/week: 3.3 standard drinks of alcohol     Types: 4 Standard drinks or equivalent per week     Comment: 2-4 drinks per week    Drug use: No           4/9/2024   STI Screening   New sexual partner(s) since last STI/HIV test? No   Last PSA:   PSA   Date Value Ref Range Status   06/11/2021 0.57 0 - 4 ug/L Final     Comment:      Assay Method:  Chemiluminescence using Siemens Vista analyzer     ASCVD Risk   The 10-year ASCVD risk score (Toro MENARD, et al., 2019) is: 2.6%    Values used to calculate the score:      Age: 54 years      Sex: Male      Is Non- : No      Diabetic: No      Tobacco smoker: No      Systolic Blood Pressure: 102 mmHg      Is BP treated: No      HDL Cholesterol: 68 mg/dL      Total Cholesterol: 186 mg/dL           Reviewed and updated as needed this visit by Provider                    Patient Active Problem List   Diagnosis    Cherry angioma    Multiple nevi    Sinus bradycardia     Past Surgical History:   Procedure Laterality Date    APPENDECTOMY OPEN      EYE SURGERY      lasix    KNEE SURGERY      left knee    LASER HOLMIUM LITHOTRIPSY URETER(S), INSERT STENT, COMBINED Left 6/19/2020    Procedure: Cystoscopy, left retrograde pyelogram, interpretation of fluoroscopic images, left ureteroscopy with holmium lithotripsy and stone basketing, placement of A 5 x 28 double-J left ureteral stent.;  Surgeon: Andrew Chavarria MD;  Location: [facility] OR    ORTHOPEDIC SURGERY      TESTICLE SURGERY      VASECTOMY         Social History     Tobacco Use    Smoking status: Never    Smokeless tobacco: Never   Substance Use Topics    Alcohol use: Yes     Alcohol/week: 3.3 standard drinks of alcohol     Types: 4 Standard drinks or equivalent per week     Comment: 2-4 drinks per week     Family History   Problem Relation Age of Onset    Prostate Cancer Father         age 65    Diabetes Mother     Heart Failure Paternal Grandmother     Colon Cancer No family hx of          Current Outpatient Medications   Medication Sig Dispense Refill    fluticasone (FLONASE) 50 MCG/ACT nasal spray Spray 1 spray into both nostrils daily       loratadine 10 MG capsule Take 10 mg by mouth daily.      Multiple Vitamins-Minerals (MULTIVITAL) TABS Take 1 tablet by mouth daily           Review of Systems  Constitutional,  "neuro, ENT, endocrine, pulmonary, cardiac, gastrointestinal, genitourinary, musculoskeletal, integument and psychiatric systems are negative, except as otherwise noted.     Objective    Exam  /60 (BP Location: Right arm, Patient Position: Sitting, Cuff Size: Adult Large)   Pulse 53   Temp 97  F (36.1  C) (Tympanic)   Resp 16   Ht 1.915 m (6' 3.39\")   Wt 116.1 kg (256 lb)   SpO2 98%   BMI 31.66 kg/m     Estimated body mass index is 31.66 kg/m  as calculated from the following:    Height as of this encounter: 1.915 m (6' 3.39\").    Weight as of this encounter: 116.1 kg (256 lb).    Physical Exam  GENERAL: alert and no distress  EYES: Eyes grossly normal to inspection, PERRL and conjunctivae and sclerae normal  HENT: ear canals and TM's normal, nose and mouth without ulcers or lesions  NECK: no adenopathy, no asymmetry, masses, or scars  RESP: lungs clear to auscultation - no rales, rhonchi or wheezes  CV: regular rate and rhythm, normal S1 S2, no S3 or S4, no murmur, click or rub, no peripheral edema  ABDOMEN: soft, nontender, no hepatosplenomegaly, no masses and bowel sounds normal  MS: no gross musculoskeletal defects noted, no edema  SKIN: no suspicious lesions or rashes  NEURO: Normal strength and tone, mentation intact and speech normal  PSYCH: mentation appears normal, affect normal/bright        Signed Electronically by: Rodney Ribeiro MD    "

## 2024-04-12 LAB
TESTOST FREE SERPL-MCNC: 3.46 NG/DL
TESTOST SERPL-MCNC: 183 NG/DL (ref 240–950)

## 2024-04-14 ENCOUNTER — TELEPHONE (OUTPATIENT)
Dept: PEDIATRICS | Facility: CLINIC | Age: 54
End: 2024-04-14
Payer: COMMERCIAL

## 2024-04-14 DIAGNOSIS — R94.6 THYROID FUNCTION TEST ABNORMAL: ICD-10-CM

## 2024-04-14 DIAGNOSIS — R79.89 ABNORMALITY OF TESTOSTERONE: Primary | ICD-10-CM

## 2024-04-14 NOTE — TELEPHONE ENCOUNTER
Please call Tyler.  I have released his recent results to [facility] with comments attached.  His testosterone level was slightly low and his thyroid lab work is slightly abnormal.  I would like him to meet with an endocrinologist-please give him the referral information:  .  Call member services at your health plan with any benefit or coverage questions.   Convoeth Kobo will call you to coordinate your care as prescribed by the provider.  If you don t hear from a representative within 2 business days, please call [phone].     If he is unable to be seen through [facility] within a reasonable period of time he may use this referral to see another endocrinology group that is in network-he will have to check his insurance coverage to see which groups are in network

## 2024-04-15 NOTE — TELEPHONE ENCOUNTER
RN attempted to reach patient. LVMTCB and speak with a nurse.     Heavenly BALL RN on 4/15/2024 at 11:24 AM

## 2024-04-16 NOTE — TELEPHONE ENCOUNTER
RN attempt #2 to reach patient. LVMTCB and speak with a nurse.     Heavenly BALL RN on 4/16/2024 at 12:35 PM

## 2024-04-16 NOTE — TELEPHONE ENCOUNTER
Patient returns call. [facility] and has already scheduled an appointment with endocrinology.

## 2024-05-02 ENCOUNTER — OFFICE VISIT (OUTPATIENT)
Dept: ENDOCRINOLOGY | Facility: CLINIC | Age: 54
End: 2024-05-02
Payer: COMMERCIAL

## 2024-05-02 VITALS
SYSTOLIC BLOOD PRESSURE: 144 MMHG | HEIGHT: 75 IN | OXYGEN SATURATION: 96 % | WEIGHT: 259.8 LBS | DIASTOLIC BLOOD PRESSURE: 88 MMHG | HEART RATE: 59 BPM | TEMPERATURE: 98.3 F | RESPIRATION RATE: 16 BRPM | BODY MASS INDEX: 32.3 KG/M2

## 2024-05-02 DIAGNOSIS — R94.6 THYROID FUNCTION TEST ABNORMAL: ICD-10-CM

## 2024-05-02 DIAGNOSIS — R79.89 ABNORMALITY OF TESTOSTERONE: ICD-10-CM

## 2024-05-02 PROCEDURE — 99204 OFFICE O/P NEW MOD 45 MIN: CPT | Performed by: INTERNAL MEDICINE

## 2024-05-02 PROCEDURE — G2211 COMPLEX E/M VISIT ADD ON: HCPCS | Performed by: INTERNAL MEDICINE

## 2024-05-02 NOTE — PATIENT INSTRUCTIONS
[facility]  Dr Omer, Endocrinology Department    [place] [address] [id number]  [place] [address]  Appointment [phone]  Fax: [phone]  [place]: Monday - Thursday      Please check the cost coverage and copay with insurance before recommended tests, services and medications (especially if new medications are prescribed).     If ordered, please get blood work done 1 week prior to your next appointment so they will be available to Dr. Omer at your visit.    Fasting morning labs needed.  Follow up in 4-5 weeks.

## 2024-05-02 NOTE — LETTER
5/2/2024         RE: Riley Lagos  [address] [facility] [address]        Dear Colleague,    Thank you for referring your patient, Riley Lagos, to the [facility]. Please see a copy of my visit note below.    Name: Riley Lagos  Seen in consultation with Rodney Ribeiro for Low Testosterone/hypogonadism and subclinical hypothyroidism  HPI:  Riley Lagos is a 54 year old male who presents for the evaluation of low testosterone.   has a past medical history of Allergic rhinitis, Kidney stone, and Pneumonia (04/2020).    4/2024 he had annual checkup and had fatigue, low sex drive, wt gain.  Not able to loose weight.  Wife is RN.   4/2024 labs showing low testosterone X 1.    He had COVID in early 2020.    Low testosterone:  Subclinical hypothyroidism;    + fatigue X few years.    Shave-No change  Hair Growth/Changes-No change  Muscle strength-Yes: noticed more weakness  OTC Herbal-No  Children-has 2 children.   Erectile dysfunction-has + Ed X few years  Decreased libido-Yes: low libido X few years.  Radiation Exposure, Mumps Orchitis, Cryptorchidism:No  H/o Torsions or Pelvic Trauma: No  Previous use of testosterone: No  + wt gain: gained about 15 lbs gradually. He is trying to loose weight- doing biking.   FH of thyroid: mother, daughters with hypothyroidism.  He has never been on thyroid hormone replacement.  He has never been on testosterone replacement.  No history of of CAD or DVT.  H/o prostate cancer in father.  No FH of thyroid cancer  No history of radiation  No compressive s/s  No other major risk factors.    Patient feels well at this time and denies any tachycardia, palpitations, heat intolerance, tremor, insomnia, diarrhea, or unexplained weight loss.  Patient also denies  cold intolerance, constipation, or unexplained weight gain.   Wt Readings from Last 2 Encounters:   05/02/24 117.8 kg (259 lb 12.8 oz)   04/10/24 116.1 kg (256 lb)      PMH/PSH:  Past Medical History:   Diagnosis Date     Allergic rhinitis      Kidney stone      Pneumonia 04/2020     Past Surgical History:   Procedure Laterality Date     APPENDECTOMY OPEN       EYE SURGERY      lasix     KNEE SURGERY      left knee     LASER HOLMIUM LITHOTRIPSY URETER(S), INSERT STENT, COMBINED Left 6/19/2020    Procedure: Cystoscopy, left retrograde pyelogram, interpretation of fluoroscopic images, left ureteroscopy with holmium lithotripsy and stone basketing, placement of A 5 x 28 double-J left ureteral stent.;  Surgeon: Andrew Chavarria MD;  Location: [facility] OR     ORTHOPEDIC SURGERY       TESTICLE SURGERY       VASECTOMY       Family Hx:  Family History   Problem Relation Age of Onset     Prostate Cancer Father         age 65     Diabetes Mother      Heart Failure Paternal Grandmother      Colon Cancer No family hx of        Social Hx:  Social History     Socioeconomic History     Marital status:      Spouse name: Not on file     Number of children: Not on file     Years of education: Not on file     Highest education level: Not on file   Occupational History     Not on file   Tobacco Use     Smoking status: Never     Smokeless tobacco: Never   Vaping Use     Vaping status: Never Used   Substance and Sexual Activity     Alcohol use: Yes     Alcohol/week: 3.3 standard drinks of alcohol     Types: 4 Standard drinks or equivalent per week     Comment: 2-4 drinks per week     Drug use: No     Sexual activity: Yes     Partners: Female     Birth control/protection: Male Surgical   Other Topics Concern     Parent/sibling w/ CABG, MI or angioplasty before 65F 55M? No   Social History Narrative     Not on file     Social Determinants of Health     Financial Resource Strain: Low Risk  (4/9/2024)    Financial Resource Strain      Within the past 12 months, have you or your family members you live with been unable to get utilities (heat, electricity) when it was really needed?: No   Food  Insecurity: Low Risk  (4/9/2024)    Food Insecurity      Within the past 12 months, did you worry that your food would run out before you got money to buy more?: No      Within the past 12 months, did the food you bought just not last and you didn t have money to get more?: No   Transportation Needs: Low Risk  (4/9/2024)    Transportation Needs      Within the past 12 months, has lack of transportation kept you from medical appointments, getting your medicines, non-medical meetings or appointments, work, or from getting things that you need?: No   Physical Activity: Insufficiently Active (4/9/2024)    Exercise Vital Sign      Days of Exercise per Week: 5 days      Minutes of Exercise per Session: 20 min   Stress: No Stress Concern Present (4/9/2024)    [employer] [place] [employer] - Occupational Stress Questionnaire      Feeling of Stress : Not at all   Social Connections: Unknown (4/9/2024)    Social Connection and Isolation Panel [NHANES]      Frequency of Communication with Friends and Family: Not on file      Frequency of Social Gatherings with Friends and Family: Once a week      Attends Confucianism Services: Not on file      Active Member of Clubs or Organizations: Not on file      Attends Club or Organization Meetings: Not on file      Marital Status: Not on file   Interpersonal Safety: Low Risk  (4/10/2024)    Interpersonal Safety      Do you feel physically and emotionally safe where you currently live?: Yes      Within the past 12 months, have you been hit, slapped, kicked or otherwise physically hurt by someone?: No      Within the past 12 months, have you been humiliated or emotionally abused in other ways by your partner or ex-partner?: No   Housing Stability: Low Risk  (4/9/2024)    Housing Stability      Do you have housing? : Yes      Are you worried about losing your housing?: No          MEDICATIONS:  has a current medication list which includes the following prescription(s): fluticasone,  "loratadine, and multivital.    ROS     ROS: 10 point ROS neg other than the symptoms noted above in the HPI.    Physical Exam   VS: BP (!) 150/82 (BP Location: Left arm, Patient Position: Chair, Cuff Size: Adult Large)   Pulse 59   Temp 98.3  F (36.8  C) (Tympanic)   Resp 16   Ht 1.915 m (6' 3.39\")   Wt 117.8 kg (259 lb 12.8 oz)   SpO2 96%   BMI 32.13 kg/m    GENERAL: healthy, alert and no distress  EYES: Eyes grossly normal to inspection, conjunctivae and sclerae normal  ENT: no nose swelling, nasal discharge.  Thyroid: no apparent thyroid nodules.  Thyroid appears normal in size and nontender.  CV: RRR, no rubs, gallops, no murmurs  RESP: CTAB, no wheezes, rales, or ronchi  ABDO: +BS  EXTREMITIES: no hand tremors.  NEURO: Cranial nerves grossly intact, mentation intact and speech normal  SKIN: No apparent skin lesions, rash or edema seen   PSYCH: mentation appears normal, affect normal/bright, judgement and insight intact, normal speech and appearance well-groomed    LABS:  TFTs:   Latest Ref Rng 4/10/2024  10:05 AM   ENDO THYROID LABS-[facility]     TSH 0.30 - 4.20 uIU/mL 9.94 (H)    FREE T4 0.90 - 1.70 ng/dL 0.99      Testosterone:  Component      Latest Ref Rng 4/10/2024  10:05 AM   Free Testosterone Calculated      ng/dL 3.46    Testosterone Total      240 - 950 ng/dL 183 (L)       FSH/LH:    CBC:    PSA:      All pertinent notes, labs, and images personally reviewed by me.     A/P  Mr.Robert KALPANA Lagos is a 54 year old here for the evaluation of hypogonadism.    1. Low Testosterone:  Pulsatile secretion of GnRH from the hypothalamus is required for both the initiation and maintenance of the reproductive axis.  GnRH stimulates the synthesis of LH and FSH.  FSH/LH  are responsible for testosterone production and spermatogenesis as well as systemic testosterone secretion and virilization.   Low testosterone X1  He has fatigue, low libido symptoms.  Never been on testosterone replacement.  No history of CAD, " prostate problem or DVT.  + Family history of prostate cancer in his father  Plan:  Discussed diagnosis, pathophysiology, management and treatment options of condition with pt.  Discussed primary and secondary hypogonadism  Plan to get labs as noted below  Based on that consider MRI  Based on that consider testosterone replacement.  Briefly discussed testosterone replacement options including intramuscular testosterone injections, AndroGel and testosterone patch.  Follow-up after labs.    2.  Subclinical hypothyroidism:  Strong family history of hypothyroidism as noted above  He has never been on thyroid hormone replacement  Has fatigue and tiredness.  Plan:  Discussed diagnosis, pathophysiology, management and treatment options of condition with pt.  Plan to check thyroid labs and screen for Hashimoto  Based on that consider thyroid hormone replacement.    Plan: Prolactin, Testosterone Free and Total, FSH         ([facility] Internal), Luteinizing Hormone, Hemoglobin    Plan: T4 free, TSH, Thyroid peroxidase antibody,         Hemoglobin            Primary hypogonadism (Klinefelter's syndrome) is characterized by a low serum testosterone level/oligo- or azoospermia with elevated serum LH and FSH concentrations. Secondary hypogonadism is diagnosed in the setting of a low testosterone level and sperm count with  low or inappropriately normal serum LH and FSH concentrations.    In secondary hypogonadism, it is also important to assess the rest of the HPaxis.   Work up may include FSH, LH, total and free Testosterone, SHBG, prolactin, ferritin (exclude hemochromatosis),TSH, freeT4, am cortisol, and IGF-1).    Discussed indications, risks and benefits of all medications prescribed, and answered questions to patient's satisfaction.  The longitudinal plan of care for the diagnosis(es)/condition(s) as documented were addressed during this visit. Due to the added complexity in care, I will continue to support Jb in the subsequent  management and with ongoing continuity of care.  All questions were answered.  The patient indicates understanding of the above issues and agrees with the plan set forth.      Follow-up:  As noted in AVS.    Vicki Omer MD  Endocrinology   [facility]/[place]    CC: Rodnye Ribeiro       Again, thank you for allowing me to participate in the care of your patient.        Sincerely,        Vicki Omer MD

## 2024-05-02 NOTE — PROGRESS NOTES
Name: Riley Lagos  Seen in consultation with Rodney Ribeiro for Low Testosterone/hypogonadism and subclinical hypothyroidism  HPI:  Riley Lagos is a 54 year old male who presents for the evaluation of low testosterone.   has a past medical history of Allergic rhinitis, Kidney stone, and Pneumonia (04/2020).    4/2024 he had annual checkup and had fatigue, low sex drive, wt gain.  Not able to loose weight.  Wife is RN.   4/2024 labs showing low testosterone X 1.    He had COVID in early 2020.    Low testosterone:  Subclinical hypothyroidism;    + fatigue X few years.    Shave-No change  Hair Growth/Changes-No change  Muscle strength-Yes: noticed more weakness  OTC Herbal-No  Children-has 2 children.   Erectile dysfunction-has + Ed X few years  Decreased libido-Yes: low libido X few years.  Radiation Exposure, Mumps Orchitis, Cryptorchidism:No  H/o Torsions or Pelvic Trauma: No  Previous use of testosterone: No  + wt gain: gained about 15 lbs gradually. He is trying to loose weight- doing biking.   FH of thyroid: mother, daughters with hypothyroidism.  He has never been on thyroid hormone replacement.  He has never been on testosterone replacement.  No history of of CAD or DVT.  H/o prostate cancer in father.  No FH of thyroid cancer  No history of radiation  No compressive s/s  No other major risk factors.    Patient feels well at this time and denies any tachycardia, palpitations, heat intolerance, tremor, insomnia, diarrhea, or unexplained weight loss.  Patient also denies  cold intolerance, constipation, or unexplained weight gain.   Wt Readings from Last 2 Encounters:   05/02/24 117.8 kg (259 lb 12.8 oz)   04/10/24 116.1 kg (256 lb)     PMH/PSH:  Past Medical History:   Diagnosis Date    Allergic rhinitis     Kidney stone     Pneumonia 04/2020     Past Surgical History:   Procedure Laterality Date    APPENDECTOMY OPEN      EYE SURGERY      lasix    KNEE SURGERY      left knee    LASER HOLMIUM  LITHOTRIPSY URETER(S), INSERT STENT, COMBINED Left 6/19/2020    Procedure: Cystoscopy, left retrograde pyelogram, interpretation of fluoroscopic images, left ureteroscopy with holmium lithotripsy and stone basketing, placement of A 5 x 28 double-J left ureteral stent.;  Surgeon: Andrew Chavarria MD;  Location: [facility] OR    ORTHOPEDIC SURGERY      TESTICLE SURGERY      VASECTOMY       Family Hx:  Family History   Problem Relation Age of Onset    Prostate Cancer Father         age 65    Diabetes Mother     Heart Failure Paternal Grandmother     Colon Cancer No family hx of        Social Hx:  Social History     Socioeconomic History    Marital status:      Spouse name: Not on file    Number of children: Not on file    Years of education: Not on file    Highest education level: Not on file   Occupational History    Not on file   Tobacco Use    Smoking status: Never    Smokeless tobacco: Never   Vaping Use    Vaping status: Never Used   Substance and Sexual Activity    Alcohol use: Yes     Alcohol/week: 3.3 standard drinks of alcohol     Types: 4 Standard drinks or equivalent per week     Comment: 2-4 drinks per week    Drug use: No    Sexual activity: Yes     Partners: Female     Birth control/protection: Male Surgical   Other Topics Concern    Parent/sibling w/ CABG, MI or angioplasty before 65F 55M? No   Social History Narrative    Not on file     Social Determinants of Health     Financial Resource Strain: Low Risk  (4/9/2024)    Financial Resource Strain     Within the past 12 months, have you or your family members you live with been unable to get utilities (heat, electricity) when it was really needed?: No   Food Insecurity: Low Risk  (4/9/2024)    Food Insecurity     Within the past 12 months, did you worry that your food would run out before you got money to buy more?: No     Within the past 12 months, did the food you bought just not last and you didn t have money to get more?: No   Transportation  "Needs: Low Risk  (4/9/2024)    Transportation Needs     Within the past 12 months, has lack of transportation kept you from medical appointments, getting your medicines, non-medical meetings or appointments, work, or from getting things that you need?: No   Physical Activity: Insufficiently Active (4/9/2024)    Exercise Vital Sign     Days of Exercise per Week: 5 days     Minutes of Exercise per Session: 20 min   Stress: No Stress Concern Present (4/9/2024)    [employer] [place] [employer] - Occupational Stress Questionnaire     Feeling of Stress : Not at all   Social Connections: Unknown (4/9/2024)    Social Connection and Isolation Panel [NHANES]     Frequency of Communication with Friends and Family: Not on file     Frequency of Social Gatherings with Friends and Family: Once a week     Attends Worship Services: Not on file     Active Member of Clubs or Organizations: Not on file     Attends Club or Organization Meetings: Not on file     Marital Status: Not on file   Interpersonal Safety: Low Risk  (4/10/2024)    Interpersonal Safety     Do you feel physically and emotionally safe where you currently live?: Yes     Within the past 12 months, have you been hit, slapped, kicked or otherwise physically hurt by someone?: No     Within the past 12 months, have you been humiliated or emotionally abused in other ways by your partner or ex-partner?: No   Housing Stability: Low Risk  (4/9/2024)    Housing Stability     Do you have housing? : Yes     Are you worried about losing your housing?: No          MEDICATIONS:  has a current medication list which includes the following prescription(s): fluticasone, loratadine, and multivital.    ROS     ROS: 10 point ROS neg other than the symptoms noted above in the HPI.    Physical Exam   VS: BP (!) 150/82 (BP Location: Left arm, Patient Position: Chair, Cuff Size: Adult Large)   Pulse 59   Temp 98.3  F (36.8  C) (Tympanic)   Resp 16   Ht 1.915 m (6' 3.39\")   Wt " 117.8 kg (259 lb 12.8 oz)   SpO2 96%   BMI 32.13 kg/m    GENERAL: healthy, alert and no distress  EYES: Eyes grossly normal to inspection, conjunctivae and sclerae normal  ENT: no nose swelling, nasal discharge.  Thyroid: no apparent thyroid nodules.  Thyroid appears normal in size and nontender.  CV: RRR, no rubs, gallops, no murmurs  RESP: CTAB, no wheezes, rales, or ronchi  ABDO: +BS  EXTREMITIES: no hand tremors.  NEURO: Cranial nerves grossly intact, mentation intact and speech normal  SKIN: No apparent skin lesions, rash or edema seen   PSYCH: mentation appears normal, affect normal/bright, judgement and insight intact, normal speech and appearance well-groomed    LABS:  TFTs:   Latest Ref Rn 4/10/2024  10:05 AM   ENDO THYROID LABS-[facility]     TSH 0.30 - 4.20 uIU/mL 9.94 (H)    FREE T4 0.90 - 1.70 ng/dL 0.99      Testosterone:  Component      Latest Ref Rng 4/10/2024  10:05 AM   Free Testosterone Calculated      ng/dL 3.46    Testosterone Total      240 - 950 ng/dL 183 (L)       FSH/LH:    CBC:    PSA:      All pertinent notes, labs, and images personally reviewed by me.     A/P  .Riley Lagos is a 54 year old here for the evaluation of hypogonadism.    1. Low Testosterone:  Pulsatile secretion of GnRH from the hypothalamus is required for both the initiation and maintenance of the reproductive axis.  GnRH stimulates the synthesis of LH and FSH.  FSH/LH  are responsible for testosterone production and spermatogenesis as well as systemic testosterone secretion and virilization.   Low testosterone X1  He has fatigue, low libido symptoms.  Never been on testosterone replacement.  No history of CAD, prostate problem or DVT.  + Family history of prostate cancer in his father  Plan:  Discussed diagnosis, pathophysiology, management and treatment options of condition with pt.  Discussed primary and secondary hypogonadism  Plan to get labs as noted below  Based on that consider MRI  Based on that consider  testosterone replacement.  Briefly discussed testosterone replacement options including intramuscular testosterone injections, AndroGel and testosterone patch.  Follow-up after labs.    2.  Subclinical hypothyroidism:  Strong family history of hypothyroidism as noted above  He has never been on thyroid hormone replacement  Has fatigue and tiredness.  Plan:  Discussed diagnosis, pathophysiology, management and treatment options of condition with pt.  Plan to check thyroid labs and screen for Hashimoto  Based on that consider thyroid hormone replacement.    Plan: Prolactin, Testosterone Free and Total, FSH         ([facility] Internal), Luteinizing Hormone, Hemoglobin    Plan: T4 free, TSH, Thyroid peroxidase antibody,         Hemoglobin            Primary hypogonadism (Klinefelter's syndrome) is characterized by a low serum testosterone level/oligo- or azoospermia with elevated serum LH and FSH concentrations. Secondary hypogonadism is diagnosed in the setting of a low testosterone level and sperm count with  low or inappropriately normal serum LH and FSH concentrations.    In secondary hypogonadism, it is also important to assess the rest of the HPaxis.   Work up may include FSH, LH, total and free Testosterone, SHBG, prolactin, ferritin (exclude hemochromatosis),TSH, freeT4, am cortisol, and IGF-1).    Discussed indications, risks and benefits of all medications prescribed, and answered questions to patient's satisfaction.  The longitudinal plan of care for the diagnosis(es)/condition(s) as documented were addressed during this visit. Due to the added complexity in care, I will continue to support Jb in the subsequent management and with ongoing continuity of care.  All questions were answered.  The patient indicates understanding of the above issues and agrees with the plan set forth.      Follow-up:  As noted in AVS.    Vicki Omer MD  Endocrinology   [facility]    CC: Rodney Ribeiro

## 2024-05-17 ENCOUNTER — LAB (OUTPATIENT)
Dept: LAB | Facility: CLINIC | Age: 54
End: 2024-05-17
Payer: COMMERCIAL

## 2024-05-17 DIAGNOSIS — R94.6 THYROID FUNCTION TEST ABNORMAL: ICD-10-CM

## 2024-05-17 DIAGNOSIS — R79.89 ABNORMALITY OF TESTOSTERONE: ICD-10-CM

## 2024-05-17 LAB
HGB BLD-MCNC: 15.4 G/DL (ref 13.3–17.7)
SHBG SERPL-SCNC: 31 NMOL/L (ref 11–80)

## 2024-05-17 PROCEDURE — 36415 COLL VENOUS BLD VENIPUNCTURE: CPT

## 2024-05-17 PROCEDURE — 85018 HEMOGLOBIN: CPT

## 2024-05-17 PROCEDURE — 84403 ASSAY OF TOTAL TESTOSTERONE: CPT

## 2024-05-17 PROCEDURE — 84443 ASSAY THYROID STIM HORMONE: CPT

## 2024-05-17 PROCEDURE — 84146 ASSAY OF PROLACTIN: CPT

## 2024-05-17 PROCEDURE — 84270 ASSAY OF SEX HORMONE GLOBUL: CPT

## 2024-05-17 PROCEDURE — 86376 MICROSOMAL ANTIBODY EACH: CPT

## 2024-05-17 PROCEDURE — 83002 ASSAY OF GONADOTROPIN (LH): CPT

## 2024-05-17 PROCEDURE — 84439 ASSAY OF FREE THYROXINE: CPT

## 2024-05-18 LAB
LH SERPL-ACNC: 2.5 MIU/ML (ref 1.7–8.6)
PROLACTIN SERPL 3RD IS-MCNC: 6 NG/ML (ref 4–15)
T4 FREE SERPL-MCNC: 1.02 NG/DL (ref 0.9–1.7)
TSH SERPL DL<=0.005 MIU/L-ACNC: 9.23 UIU/ML (ref 0.3–4.2)

## 2024-05-20 LAB — THYROPEROXIDASE AB SERPL-ACNC: 1211 IU/ML

## 2024-05-21 LAB
TESTOST FREE SERPL-MCNC: 8.8 NG/DL
TESTOST SERPL-MCNC: 415 NG/DL (ref 240–950)

## 2024-05-30 ENCOUNTER — VIRTUAL VISIT (OUTPATIENT)
Dept: ENDOCRINOLOGY | Facility: CLINIC | Age: 54
End: 2024-05-30
Payer: COMMERCIAL

## 2024-05-30 VITALS — WEIGHT: 250 LBS | BODY MASS INDEX: 30.92 KG/M2

## 2024-05-30 DIAGNOSIS — R76.8 ANTI-TPO ANTIBODIES PRESENT: ICD-10-CM

## 2024-05-30 DIAGNOSIS — E03.8 SUBCLINICAL HYPOTHYROIDISM: Primary | ICD-10-CM

## 2024-05-30 PROCEDURE — 99214 OFFICE O/P EST MOD 30 MIN: CPT | Mod: 95 | Performed by: INTERNAL MEDICINE

## 2024-05-30 PROCEDURE — G2211 COMPLEX E/M VISIT ADD ON: HCPCS | Mod: 95 | Performed by: INTERNAL MEDICINE

## 2024-05-30 RX ORDER — LEVOTHYROXINE SODIUM 25 UG/1
25 TABLET ORAL DAILY
Qty: 90 TABLET | Refills: 2 | Status: SHIPPED | OUTPATIENT
Start: 2024-05-30

## 2024-05-30 NOTE — NURSING NOTE
Is the patient currently in the state of [place]? YES    Visit mode:VIDEO    If the visit is dropped, the patient can be reconnected by: VIDEO VISIT: Text to cell phone:   Telephone Information:   Mobile [phone]       Will anyone else be joining the visit? NO  (If patient encounters technical issues they should call [phone] :[phone])    How would you like to obtain your AVS? MyChart    Are changes needed to the allergy or medication list? No    Are refills needed on medications prescribed by this physician? NO    Reason for visit: Video Visit and RECHECK    Jonna MCCORMICK

## 2024-05-30 NOTE — LETTER
"    5/30/2024         RE: Riley Lagos  [address] [id number]        Dear Colleague,    Thank you for referring your patient, Riley Lagos, to the [facility]. Please see a copy of my visit note below.    THIS IS A VIDEO VISIT:    Phone call visit/virtual visit encounter:    Name of patient: Riley Lagos    Date of encounter: 5/30/2024    Time of start of video visit: 9: 32    Video started: 9:42    Video ended: 9:51    Provider location: working from home/ [facility]    Patient location: patients home.    Mode of transmission: [employer] video/ [employer]    Verbal consent: obtained before starting visit. Pt is agreeable.      The patient has been notified of following:      \"This VIDEO visit will be conducted via a call between you and your physician/provider. We have found that certain health care needs can be provided without the need for a physical exam.  This service lets us provide the care you need with a short phone conversation.  If a prescription is necessary we can send it directly to your pharmacy.  If lab work is needed we can place an order for that and you can then stop by our lab to have the test done at a later time.     With new updates with corona virus patient might be billed as clinic visit.     If during the course of the call the physician/provider feels a telephone visit is not appropriate, you will not be charged for this service.\"      Past medical history, social history, family history, allergy and medications were reviewed and updated as appropriate.  Reviewed pertinent labs, notes, imaging studies personally.    Name: Riley Lagos  Seen for follow up of Testosterone/hypogonadism and subclinical hypothyroidism  HPI:  Riley Lagos is a 54 year old male who presents for the evaluation of low testosterone.   has a past medical history of Allergic rhinitis, Kidney stone, and Pneumonia (04/2020).    4/2024 he had annual " checkup and had fatigue, low sex drive, wt gain.  Not able to loose weight.  Wife is RN.   4/2024 labs showing low testosterone X 1.    He had COVID in early 2020.    Low testosterone: 4/2024 labs done with primary care provider (in the setting of fatigue and tiredness) showed low total testosterone 193.  Recheck labs 5/2020.  Normal total testosterone 415.  Other pituitary hormones showed normal prolactin levels.    Subclinical hypothyroidism (TPO positive):  4/2024 labs consistent with subclinical hypothyroidism.  Recheck 5/2024 labs above consistent with subclinical hypothyroidism and.  Positive TPO antibodies.  He has never been on thyroid hormone replacement.    + fatigue X few years.  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 He has 4 episodes since 1/2024. Was seen by optho. His wife is neurology NP.    Shave-No change  Hair Growth/Changes-No change  Muscle strength-Yes: noticed more weakness  OTC Herbal-No  Children-has 2 children.   Erectile dysfunction-has + Ed X few years  Decreased libido-Yes: low libido X few years.  Radiation Exposure, Mumps Orchitis, Cryptorchidism:No  H/o Torsions or Pelvic Trauma: No  Previous use of testosterone: No  + wt gain: gained about 15 lbs gradually. He is trying to loose weight- doing biking.   FH of thyroid: mother, daughters with hypothyroidism.  He has never been on thyroid hormone replacement.  He has never been on testosterone replacement.  No history of of CAD or DVT.  H/o prostate cancer in father.  No FH of thyroid cancer  No history of radiation  No compressive s/s  No other major risk factors.    Patient feels well at this time and denies any tachycardia, palpitations, heat intolerance, tremor, insomnia, diarrhea, or unexplained weight loss.  Patient also denies  cold intolerance, constipation, or unexplained weight gain.   Wt Readings from Last 2 Encounters:   05/30/24 113.4 kg (250 lb)   05/02/24 117.8 kg (259 lb 12.8 oz)     PMH/PSH:  Past Medical History:    Diagnosis Date     Allergic rhinitis      Kidney stone      Pneumonia 04/2020     Past Surgical History:   Procedure Laterality Date     APPENDECTOMY OPEN       EYE SURGERY      lasix     KNEE SURGERY      left knee     LASER HOLMIUM LITHOTRIPSY URETER(S), INSERT STENT, COMBINED Left 6/19/2020    Procedure: Cystoscopy, left retrograde pyelogram, interpretation of fluoroscopic images, left ureteroscopy with holmium lithotripsy and stone basketing, placement of A 5 x 28 double-J left ureteral stent.;  Surgeon: Andrew Chavarria MD;  Location: [facility] OR     ORTHOPEDIC SURGERY       TESTICLE SURGERY       VASECTOMY       Family Hx:  Family History   Problem Relation Age of Onset     Prostate Cancer Father         age 65     Diabetes Mother      Heart Failure Paternal Grandmother      Colon Cancer No family hx of        Social Hx:  Social History     Socioeconomic History     Marital status:      Spouse name: Not on file     Number of children: Not on file     Years of education: Not on file     Highest education level: Not on file   Occupational History     Not on file   Tobacco Use     Smoking status: Never     Smokeless tobacco: Never   Vaping Use     Vaping status: Never Used   Substance and Sexual Activity     Alcohol use: Yes     Alcohol/week: 3.3 standard drinks of alcohol     Types: 4 Standard drinks or equivalent per week     Comment: 2-4 drinks per week     Drug use: No     Sexual activity: Yes     Partners: Female     Birth control/protection: Male Surgical   Other Topics Concern     Parent/sibling w/ CABG, MI or angioplasty before 65F 55M? No   Social History Narrative     Not on file     Social Determinants of Health     Financial Resource Strain: Low Risk  (4/9/2024)    Financial Resource Strain      Within the past 12 months, have you or your family members you live with been unable to get utilities (heat, electricity) when it was really needed?: No   Food Insecurity: Low Risk  (4/9/2024)     Food Insecurity      Within the past 12 months, did you worry that your food would run out before you got money to buy more?: No      Within the past 12 months, did the food you bought just not last and you didn t have money to get more?: No   Transportation Needs: Low Risk  (4/9/2024)    Transportation Needs      Within the past 12 months, has lack of transportation kept you from medical appointments, getting your medicines, non-medical meetings or appointments, work, or from getting things that you need?: No   Physical Activity: Insufficiently Active (4/9/2024)    Exercise Vital Sign      Days of Exercise per Week: 5 days      Minutes of Exercise per Session: 20 min   Stress: No Stress Concern Present (4/9/2024)    [place] [employer] - Occupational Stress Questionnaire      Feeling of Stress : Not at all   Social Connections: Unknown (4/9/2024)    Social Connection and Isolation Panel [NHANES]      Frequency of Communication with Friends and Family: Not on file      Frequency of Social Gatherings with Friends and Family: Once a week      Attends Baptism Services: Not on file      Active Member of Clubs or Organizations: Not on file      Attends Club or Organization Meetings: Not on file      Marital Status: Not on file   Interpersonal Safety: Low Risk  (4/10/2024)    Interpersonal Safety      Do you feel physically and emotionally safe where you currently live?: Yes      Within the past 12 months, have you been hit, slapped, kicked or otherwise physically hurt by someone?: No      Within the past 12 months, have you been humiliated or emotionally abused in other ways by your partner or ex-partner?: No   Housing Stability: Low Risk  (4/9/2024)    Housing Stability      Do you have housing? : Yes      Are you worried about losing your housing?: No          MEDICATIONS:  has a current medication list which includes the following prescription(s): fluticasone, loratadine, and multivital.    ROS      ROS: 10 point ROS neg other than the symptoms noted above in the HPI.    Physical Exam   VS: Wt 113.4 kg (250 lb)   BMI 30.92 kg/m    GENERAL: healthy, alert and no distress  EYES: Eyes grossly normal to inspection, conjunctivae and sclerae normal  ENT: no nose swelling, nasal discharge.  Thyroid: no apparent thyroid nodules  RESP: no audible wheeze, cough, or visible cyanosis.  No visible retractions or increased work of breathing.  Able to speak fully in complete sentences.  ABDO: not evaluated.  EXTREMITIES: no hand tremors.  NEURO: Cranial nerves grossly intact, mentation intact and speech normal  SKIN: No apparent skin lesions, rash or edema seen   PSYCH: mentation appears normal, affect normal/bright, judgement and insight intact, normal speech and appearance well-groomed    LABS:  TFTs:   Latest Ref Rng 4/10/2024  10:05 AM   ENDO THYROID LABS-[facility]     TSH 0.30 - 4.20 uIU/mL 9.94 (H)    FREE T4 0.90 - 1.70 ng/dL 0.99      Testosterone:  Component      Latest Ref Rng 4/10/2024  10:05 AM   Free Testosterone Calculated      ng/dL 3.46    Testosterone Total      240 - 950 ng/dL 183 (L)       FSH/LH:    CBC:    PSA:      All pertinent notes, labs, and images personally reviewed by me.     A/P  Mr.Robert KALPANA Lagos is a 54 year old here for the evaluation of hypogonadism.    1. Low Testosterone:  Pulsatile secretion of GnRH from the hypothalamus is required for both the initiation and maintenance of the reproductive axis.  GnRH stimulates the synthesis of LH and FSH.  FSH/LH  are responsible for testosterone production and spermatogenesis as well as systemic testosterone secretion and virilization.   Low testosterone X1.  Recheck testosterone labs (5/2024) were in normal range.  He has fatigue, low libido symptoms.  Never been on testosterone replacement.  No history of CAD, prostate problem or DVT.  + Family history of prostate cancer in his father  Plan:  Discussed diagnosis, pathophysiology, management and  treatment options of condition with pt.  Discussed primary and secondary hypogonadism  5/2024 labs showing normal testosterone level.  Discussed that as testosterone levels are in normal range, testosterone hormone replacement is not indicated.  Plan to continue monitor.    2.  Subclinical hypothyroidism (+TPO):  Strong family history of hypothyroidism as noted above  He has never been on thyroid hormone replacement  Has fatigue and tiredness.  Plan:  Discussed diagnosis, pathophysiology, management and treatment options of condition with pt.  Based on 5/2024 labs in the setting of symptoms and positive TPO antibodies,recommend to start levothyroxine 25 mcg/day.  Lab only appointment in 2-3 months.  If labs are stable then plan to continue current dose of thyroid medication.  You can continue to follow with primary care provider for ongoing thyroid hormone replacement if labs are stable.  Follow-up with endocrinology as needed.          Primary hypogonadism (Klinefelter's syndrome) is characterized by a low serum testosterone level/oligo- or azoospermia with elevated serum LH and FSH concentrations. Secondary hypogonadism is diagnosed in the setting of a low testosterone level and sperm count with  low or inappropriately normal serum LH and FSH concentrations.    Discussed indications, risks and benefits of all medications prescribed, and answered questions to patient's satisfaction.  The longitudinal plan of care for the diagnosis(es)/condition(s) as documented were addressed during this visit. Due to the added complexity in care, I will continue to support Jb in the subsequent management and with ongoing continuity of care.  All questions were answered.  The patient indicates understanding of the above issues and agrees with the plan set forth.      Follow-up:  As noted in AVS.    Vicki Omer MD  Endocrinology   [facility]/[place]    CC: Rodney Ribeiro       Again, thank you for allowing me  to participate in the care of your patient.        Sincerely,        Vicki Omer MD

## 2024-05-30 NOTE — PROGRESS NOTES
"THIS IS A VIDEO VISIT:    Phone call visit/virtual visit encounter:    Name of patient: Riley Lagos    Date of encounter: 5/30/2024    Time of start of video visit: 9: 32    Video started: 9:42    Video ended: 9:51    Provider location: working from home/ [facility]    Patient location: patients home.    Mode of transmission: [employer] video/ [employer]    Verbal consent: obtained before starting visit. Pt is agreeable.      The patient has been notified of following:      \"This VIDEO visit will be conducted via a call between you and your physician/provider. We have found that certain health care needs can be provided without the need for a physical exam.  This service lets us provide the care you need with a short phone conversation.  If a prescription is necessary we can send it directly to your pharmacy.  If lab work is needed we can place an order for that and you can then stop by our lab to have the test done at a later time.     With new updates with corona virus patient might be billed as clinic visit.     If during the course of the call the physician/provider feels a telephone visit is not appropriate, you will not be charged for this service.\"      Past medical history, social history, family history, allergy and medications were reviewed and updated as appropriate.  Reviewed pertinent labs, notes, imaging studies personally.    Name: Riley Lagos  Seen for follow up of Testosterone/hypogonadism and subclinical hypothyroidism  HPI:  Riley Lagos is a 54 year old male who presents for the evaluation of low testosterone.   has a past medical history of Allergic rhinitis, Kidney stone, and Pneumonia (04/2020).    4/2024 he had annual checkup and had fatigue, low sex drive, wt gain.  Not able to loose weight.  Wife is RN.   4/2024 labs showing low testosterone X 1.    He had COVID in early 2020.    Low testosterone: 4/2024 labs done with primary care provider (in the setting of fatigue and " tiredness) showed low total testosterone 193.  Recheck labs 5/2020.  Normal total testosterone 415.  Other pituitary hormones showed normal prolactin levels.    Subclinical hypothyroidism (TPO positive):  4/2024 labs consistent with subclinical hypothyroidism.  Recheck 5/2024 labs above consistent with subclinical hypothyroidism and.  Positive TPO antibodies.  He has never been on thyroid hormone replacement.    + fatigue X few years.  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 He has 4 episodes since 1/2024. Was seen by optho. His wife is neurology NP.    Shave-No change  Hair Growth/Changes-No change  Muscle strength-Yes: noticed more weakness  OTC Herbal-No  Children-has 2 children.   Erectile dysfunction-has + Ed X few years  Decreased libido-Yes: low libido X few years.  Radiation Exposure, Mumps Orchitis, Cryptorchidism:No  H/o Torsions or Pelvic Trauma: No  Previous use of testosterone: No  + wt gain: gained about 15 lbs gradually. He is trying to loose weight- doing biking.   FH of thyroid: mother, daughters with hypothyroidism.  He has never been on thyroid hormone replacement.  He has never been on testosterone replacement.  No history of of CAD or DVT.  H/o prostate cancer in father.  No FH of thyroid cancer  No history of radiation  No compressive s/s  No other major risk factors.    Patient feels well at this time and denies any tachycardia, palpitations, heat intolerance, tremor, insomnia, diarrhea, or unexplained weight loss.  Patient also denies  cold intolerance, constipation, or unexplained weight gain.   Wt Readings from Last 2 Encounters:   05/30/24 113.4 kg (250 lb)   05/02/24 117.8 kg (259 lb 12.8 oz)     PMH/PSH:  Past Medical History:   Diagnosis Date    Allergic rhinitis     Kidney stone     Pneumonia 04/2020     Past Surgical History:   Procedure Laterality Date    APPENDECTOMY OPEN      EYE SURGERY      lasix    KNEE SURGERY      left knee    LASER HOLMIUM LITHOTRIPSY URETER(S), INSERT STENT,  COMBINED Left 6/19/2020    Procedure: Cystoscopy, left retrograde pyelogram, interpretation of fluoroscopic images, left ureteroscopy with holmium lithotripsy and stone basketing, placement of A 5 x 28 double-J left ureteral stent.;  Surgeon: Andrew Chavarria MD;  Location: [facility] OR    ORTHOPEDIC SURGERY      TESTICLE SURGERY      VASECTOMY       Family Hx:  Family History   Problem Relation Age of Onset    Prostate Cancer Father         age 65    Diabetes Mother     Heart Failure Paternal Grandmother     Colon Cancer No family hx of        Social Hx:  Social History     Socioeconomic History    Marital status:      Spouse name: Not on file    Number of children: Not on file    Years of education: Not on file    Highest education level: Not on file   Occupational History    Not on file   Tobacco Use    Smoking status: Never    Smokeless tobacco: Never   Vaping Use    Vaping status: Never Used   Substance and Sexual Activity    Alcohol use: Yes     Alcohol/week: 3.3 standard drinks of alcohol     Types: 4 Standard drinks or equivalent per week     Comment: 2-4 drinks per week    Drug use: No    Sexual activity: Yes     Partners: Female     Birth control/protection: Male Surgical   Other Topics Concern    Parent/sibling w/ CABG, MI or angioplasty before 65F 55M? No   Social History Narrative    Not on file     Social Determinants of Health     Financial Resource Strain: Low Risk  (4/9/2024)    Financial Resource Strain     Within the past 12 months, have you or your family members you live with been unable to get utilities (heat, electricity) when it was really needed?: No   Food Insecurity: Low Risk  (4/9/2024)    Food Insecurity     Within the past 12 months, did you worry that your food would run out before you got money to buy more?: No     Within the past 12 months, did the food you bought just not last and you didn t have money to get more?: No   Transportation Needs: Low Risk  (4/9/2024)     Transportation Needs     Within the past 12 months, has lack of transportation kept you from medical appointments, getting your medicines, non-medical meetings or appointments, work, or from getting things that you need?: No   Physical Activity: Insufficiently Active (4/9/2024)    Exercise Vital Sign     Days of Exercise per Week: 5 days     Minutes of Exercise per Session: 20 min   Stress: No Stress Concern Present (4/9/2024)    [place] [employer]ational [employer] - Occupational Stress Questionnaire     Feeling of Stress : Not at all   Social Connections: Unknown (4/9/2024)    Social Connection and Isolation Panel [NHANES]     Frequency of Communication with Friends and Family: Not on file     Frequency of Social Gatherings with Friends and Family: Once a week     Attends Sabianist Services: Not on file     Active Member of Clubs or Organizations: Not on file     Attends Club or Organization Meetings: Not on file     Marital Status: Not on file   Interpersonal Safety: Low Risk  (4/10/2024)    Interpersonal Safety     Do you feel physically and emotionally safe where you currently live?: Yes     Within the past 12 months, have you been hit, slapped, kicked or otherwise physically hurt by someone?: No     Within the past 12 months, have you been humiliated or emotionally abused in other ways by your partner or ex-partner?: No   Housing Stability: Low Risk  (4/9/2024)    Housing Stability     Do you have housing? : Yes     Are you worried about losing your housing?: No          MEDICATIONS:  has a current medication list which includes the following prescription(s): fluticasone, loratadine, and multivital.    ROS     ROS: 10 point ROS neg other than the symptoms noted above in the HPI.    Physical Exam   VS: Wt 113.4 kg (250 lb)   BMI 30.92 kg/m    GENERAL: healthy, alert and no distress  EYES: Eyes grossly normal to inspection, conjunctivae and sclerae normal  ENT: no nose swelling, nasal discharge.  Thyroid:  no apparent thyroid nodules  RESP: no audible wheeze, cough, or visible cyanosis.  No visible retractions or increased work of breathing.  Able to speak fully in complete sentences.  ABDO: not evaluated.  EXTREMITIES: no hand tremors.  NEURO: Cranial nerves grossly intact, mentation intact and speech normal  SKIN: No apparent skin lesions, rash or edema seen   PSYCH: mentation appears normal, affect normal/bright, judgement and insight intact, normal speech and appearance well-groomed    LABS:  TFTs:   Latest Ref Rng 4/10/2024  10:05 AM   ENDO THYROID LABS-[facility]     TSH 0.30 - 4.20 uIU/mL 9.94 (H)    FREE T4 0.90 - 1.70 ng/dL 0.99      Testosterone:  Component      Latest Ref Rng 4/10/2024  10:05 AM   Free Testosterone Calculated      ng/dL 3.46    Testosterone Total      240 - 950 ng/dL 183 (L)       FSH/LH:    CBC:    PSA:      All pertinent notes, labs, and images personally reviewed by me.     A/P  Mr.Robert KALPANA Lagos is a 54 year old here for the evaluation of hypogonadism.    1. Low Testosterone:  Pulsatile secretion of GnRH from the hypothalamus is required for both the initiation and maintenance of the reproductive axis.  GnRH stimulates the synthesis of LH and FSH.  FSH/LH  are responsible for testosterone production and spermatogenesis as well as systemic testosterone secretion and virilization.   Low testosterone X1.  Recheck testosterone labs (5/2024) were in normal range.  He has fatigue, low libido symptoms.  Never been on testosterone replacement.  No history of CAD, prostate problem or DVT.  + Family history of prostate cancer in his father  Plan:  Discussed diagnosis, pathophysiology, management and treatment options of condition with pt.  Discussed primary and secondary hypogonadism  5/2024 labs showing normal testosterone level.  Discussed that as testosterone levels are in normal range, testosterone hormone replacement is not indicated.  Plan to continue monitor.    2.  Subclinical hypothyroidism  (+TPO):  Strong family history of hypothyroidism as noted above  He has never been on thyroid hormone replacement  Has fatigue and tiredness.  Plan:  Discussed diagnosis, pathophysiology, management and treatment options of condition with pt.  Based on 5/2024 labs in the setting of symptoms and positive TPO antibodies,recommend to start levothyroxine 25 mcg/day.  Lab only appointment in 2-3 months.  If labs are stable then plan to continue current dose of thyroid medication.  You can continue to follow with primary care provider for ongoing thyroid hormone replacement if labs are stable.  Follow-up with endocrinology as needed.          Primary hypogonadism (Klinefelter's syndrome) is characterized by a low serum testosterone level/oligo- or azoospermia with elevated serum LH and FSH concentrations. Secondary hypogonadism is diagnosed in the setting of a low testosterone level and sperm count with  low or inappropriately normal serum LH and FSH concentrations.    Discussed indications, risks and benefits of all medications prescribed, and answered questions to patient's satisfaction.  The longitudinal plan of care for the diagnosis(es)/condition(s) as documented were addressed during this visit. Due to the added complexity in care, I will continue to support Jb in the subsequent management and with ongoing continuity of care.  All questions were answered.  The patient indicates understanding of the above issues and agrees with the plan set forth.      Follow-up:  As noted in AVS.    Vicki Omer MD  Endocrinology   [facility]/[place]    CC: Rodney Ribeiro

## 2024-05-30 NOTE — PATIENT INSTRUCTIONS
[facility]  Dr Omer, Endocrinology Department    [place] [address] [id number]  [place] [address]  Appointment [phone]  Fax: [phone]  [place]: Monday - Thursday       Start levothyroxine 25 mcg/day  Lab only appointment in 2-3 months.  If labs are stable then plan to continue current dose of thyroid medication.  You can continue to follow with primary care provider for ongoing thyroid hormone replacement if labs are stable.  Follow-up with endocrinology as needed.    Take Levothyroxine on an empty stomach. Take it with a full glass of water at least 30 minutes to 1 hour before eating breakfast.   This medicine should be taken at least 4 hours before or 4 hours after these medicines: antacids (Maalox , Mylanta , Tums ), calcium supplements, cholestyramine (Prevalite , Questran ), colestipol (Colestid ), iron supplements, orlistat (Juan M , Xenical ), simethicone (Gas-X , Mylicon ), and sucralfate (Carafate ).   Swallow the capsule whole. Do not cut or crush it.

## 2024-11-01 ENCOUNTER — IMMUNIZATION (OUTPATIENT)
Dept: PEDIATRICS | Facility: CLINIC | Age: 54
End: 2024-11-01
Payer: COMMERCIAL

## 2024-11-01 DIAGNOSIS — Z23 ENCOUNTER FOR IMMUNIZATION: Primary | ICD-10-CM

## 2024-11-01 PROCEDURE — 99207 PR NO CHARGE NURSE ONLY: CPT

## 2024-11-01 PROCEDURE — 90471 IMMUNIZATION ADMIN: CPT

## 2024-11-01 PROCEDURE — 90673 RIV3 VACCINE NO PRESERV IM: CPT

## 2024-11-01 PROCEDURE — 90480 ADMN SARSCOV2 VAC 1/ONLY CMP: CPT

## 2024-11-01 PROCEDURE — 91320 SARSCV2 VAC 30MCG TRS-SUC IM: CPT

## 2024-11-01 NOTE — PROGRESS NOTES
Prior to immunization administration, verified patients identity using patient s name and date of birth. Please see Immunization Activity for additional information.     Is the patient's temperature normal (100.5 or less)? Yes     Patient MEETS CRITERIA. PROCEED with vaccine administration.      Patient instructed to remain in clinic for 15 minutes afterwards, and to report any adverse reactions.      Link to Ancillary Visit Immunization Standing Orders SmartSet     Screening performed by Maddy Patel CMA on 11/1/2024 at 1:57 PM.

## 2025-02-10 ENCOUNTER — MYC REFILL (OUTPATIENT)
Dept: ENDOCRINOLOGY | Facility: CLINIC | Age: 55
End: 2025-02-10
Payer: COMMERCIAL

## 2025-02-10 DIAGNOSIS — E03.8 SUBCLINICAL HYPOTHYROIDISM: ICD-10-CM

## 2025-02-10 DIAGNOSIS — R76.8 ANTI-TPO ANTIBODIES PRESENT: ICD-10-CM

## 2025-02-17 RX ORDER — LEVOTHYROXINE SODIUM 25 UG/1
25 TABLET ORAL DAILY
Qty: 90 TABLET | Refills: 2 | Status: SHIPPED | OUTPATIENT
Start: 2025-02-17

## 2025-04-11 SDOH — HEALTH STABILITY: PHYSICAL HEALTH: ON AVERAGE, HOW MANY MINUTES DO YOU ENGAGE IN EXERCISE AT THIS LEVEL?: 20 MIN

## 2025-04-11 SDOH — HEALTH STABILITY: PHYSICAL HEALTH: ON AVERAGE, HOW MANY DAYS PER WEEK DO YOU ENGAGE IN MODERATE TO STRENUOUS EXERCISE (LIKE A BRISK WALK)?: 5 DAYS

## 2025-04-11 ASSESSMENT — SOCIAL DETERMINANTS OF HEALTH (SDOH): HOW OFTEN DO YOU GET TOGETHER WITH FRIENDS OR RELATIVES?: ONCE A WEEK

## 2025-04-14 ENCOUNTER — OFFICE VISIT (OUTPATIENT)
Dept: PEDIATRICS | Facility: CLINIC | Age: 55
End: 2025-04-14
Attending: INTERNAL MEDICINE
Payer: COMMERCIAL

## 2025-04-14 VITALS
HEIGHT: 76 IN | BODY MASS INDEX: 31.79 KG/M2 | TEMPERATURE: 97.1 F | WEIGHT: 261.1 LBS | DIASTOLIC BLOOD PRESSURE: 80 MMHG | RESPIRATION RATE: 16 BRPM | SYSTOLIC BLOOD PRESSURE: 126 MMHG | HEART RATE: 51 BPM | OXYGEN SATURATION: 98 %

## 2025-04-14 DIAGNOSIS — E03.8 SUBCLINICAL HYPOTHYROIDISM: ICD-10-CM

## 2025-04-14 DIAGNOSIS — H91.90 HEARING LOSS, UNSPECIFIED HEARING LOSS TYPE, UNSPECIFIED LATERALITY: ICD-10-CM

## 2025-04-14 DIAGNOSIS — Z00.00 ROUTINE GENERAL MEDICAL EXAMINATION AT A HEALTH CARE FACILITY: Primary | ICD-10-CM

## 2025-04-14 DIAGNOSIS — Z13.220 LIPID SCREENING: ICD-10-CM

## 2025-04-14 DIAGNOSIS — Z12.5 SCREENING FOR PROSTATE CANCER: ICD-10-CM

## 2025-04-14 DIAGNOSIS — E66.9 OBESITY, UNSPECIFIED OBESITY SEVERITY, UNSPECIFIED OBESITY TYPE: ICD-10-CM

## 2025-04-14 PROBLEM — R94.6 THYROID FUNCTION TEST ABNORMAL: Status: RESOLVED | Noted: 2024-05-02 | Resolved: 2025-04-14

## 2025-04-14 PROBLEM — R00.1 SINUS BRADYCARDIA: Status: RESOLVED | Noted: 2021-05-29 | Resolved: 2025-04-14

## 2025-04-14 PROCEDURE — 80053 COMPREHEN METABOLIC PANEL: CPT | Performed by: INTERNAL MEDICINE

## 2025-04-14 PROCEDURE — 36415 COLL VENOUS BLD VENIPUNCTURE: CPT | Performed by: INTERNAL MEDICINE

## 2025-04-14 PROCEDURE — 1126F AMNT PAIN NOTED NONE PRSNT: CPT | Performed by: INTERNAL MEDICINE

## 2025-04-14 PROCEDURE — 90471 IMMUNIZATION ADMIN: CPT | Performed by: INTERNAL MEDICINE

## 2025-04-14 PROCEDURE — 80061 LIPID PANEL: CPT | Performed by: INTERNAL MEDICINE

## 2025-04-14 PROCEDURE — 3074F SYST BP LT 130 MM HG: CPT | Performed by: INTERNAL MEDICINE

## 2025-04-14 PROCEDURE — 3079F DIAST BP 80-89 MM HG: CPT | Performed by: INTERNAL MEDICINE

## 2025-04-14 PROCEDURE — 99396 PREV VISIT EST AGE 40-64: CPT | Mod: 25 | Performed by: INTERNAL MEDICINE

## 2025-04-14 PROCEDURE — 90677 PCV20 VACCINE IM: CPT | Performed by: INTERNAL MEDICINE

## 2025-04-14 PROCEDURE — G0103 PSA SCREENING: HCPCS | Performed by: INTERNAL MEDICINE

## 2025-04-14 ASSESSMENT — PAIN SCALES - GENERAL: PAINLEVEL_OUTOF10: NO PAIN (0)

## 2025-04-14 NOTE — PROGRESS NOTES
"Preventive Care Visit  [facility] ADRIEN Ribeiro MD, Internal Medicine - Pediatrics  Apr 14, 2025      Assessment & Plan     (Z00.00) Routine general medical examination at a health care facility  (primary encounter diagnosis)    (E03.8) Subclinical hypothyroidism  Comment:   Plan: managed by endocrinology, scheduled for follow-up in the next few months    (H91.90) Hearing loss, unspecified hearing loss type, unspecified laterality  Comment:   Plan: Adult Audiology  Referral          (E66.9) Obesity, unspecified obesity severity, unspecified obesity type  Comment:   Plan: Adult Comprehensive Weight Management         Referral        Questions regarding weight mgmt and medications/ discussed.    (Z13.220) Lipid screening  Comment:   Plan: Lipid panel reflex to direct LDL Fasting,         Comprehensive metabolic panel (BMP + Alb, Alk         Phos, ALT, AST, Total. Bili, TP)          (Z12.5) Screening for prostate cancer  Comment:   Plan: PSA, screen              BMI  Estimated body mass index is 32.2 kg/m  as calculated from the following:    Height as of this encounter: 1.918 m (6' 3.5\").    Weight as of this encounter: 118.4 kg (261 lb 1.6 oz).       Counseling  Appropriate preventive services were addressed with this patient via screening, questionnaire, or discussion as appropriate for fall prevention, nutrition, physical activity, Tobacco-use cessation, social engagement, weight loss and cognition.  Checklist reviewing preventive services available has been given to the patient.  Reviewed patient's diet, addressing concerns and/or questions.           Neena Muhammad is a 55 year old, presenting for the following:  Physical        4/14/2025    10:47 AM   Additional Questions   Roomed by Rebecca   Accompanied by self         4/14/2025    10:47 AM   Patient Reported Additional Medications   Patient reports taking the following new medications no          HPI         Advance " Care Planning  Patient does not have a Health Care Directive: Discussed advance care planning with patient; information given to patient to review.      4/11/2025   General Health   How would you rate your overall physical health? Good   Feel stress (tense, anxious, or unable to sleep) Not at all         4/11/2025   Nutrition   Three or more servings of calcium each day? Yes   Diet: Regular (no restrictions)   How many servings of fruit and vegetables per day? (!) 0-1   How many sweetened beverages each day? 0-1         4/11/2025   Exercise   Days per week of moderate/strenous exercise 5 days   Average minutes spent exercising at this level 20 min         4/11/2025   Social Factors   Frequency of gathering with friends or relatives Once a week   Worry food won't last until get money to buy more No   Food not last or not have enough money for food? No   Do you have housing? (Housing is defined as stable permanent housing and does not include staying ouside in a car, in a tent, in an abandoned building, in an overnight shelter, or couch-surfing.) Yes   Are you worried about losing your housing? No   Lack of transportation? No   Unable to get utilities (heat,electricity)? No         4/11/2025   Fall Risk   Fallen 2 or more times in the past year? No   Trouble with walking or balance? No          4/11/2025   Dental   Dentist two times every year? Yes           4/9/2024   TB Screening   Were you born outside of the [place]? No           Today's PHQ-2 Score:       4/13/2025     5:55 PM   PHQ-2 ( 1999 [employer])   Q1: Little interest or pleasure in doing things 0   Q2: Feeling down, depressed or hopeless 0   PHQ-2 Score 0    Q1: Little interest or pleasure in doing things Not at all   Q2: Feeling down, depressed or hopeless Not at all   PHQ-2 Score 0       Patient-reported           4/11/2025   Substance Use   Alcohol more than 3/day or more than 7/wk No   Do you use any other substances recreationally? (!) CANNABIS PRODUCTS      Social History     Tobacco Use    Smoking status: Never    Smokeless tobacco: Never   Vaping Use    Vaping status: Never Used   Substance Use Topics    Alcohol use: Yes     Alcohol/week: 3.3 standard drinks of alcohol     Types: 4 Standard drinks or equivalent per week     Comment: 2-4 drinks per week    Drug use: No           4/11/2025   STI Screening   New sexual partner(s) since last STI/HIV test? No   Last PSA:   PSA   Date Value Ref Range Status   06/11/2021 0.57 0 - 4 ug/L Final     Comment:     Assay Method:  Chemiluminescence using Siemens Vista analyzer     Prostate Specific Antigen Screen   Date Value Ref Range Status   04/10/2024 0.59 0.00 - 3.50 ng/mL Final     ASCVD Risk   The 10-year ASCVD risk score (Toro MENARD, et al., 2019) is: 5.1%    Values used to calculate the score:      Age: 55 years      Sex: Male      Is Non- : No      Diabetic: No      Tobacco smoker: No      Systolic Blood Pressure: 126 mmHg      Is BP treated: No      HDL Cholesterol: 65 mg/dL      Total Cholesterol: 213 mg/dL           Reviewed and updated as needed this visit by Provider                    Patient Active Problem List   Diagnosis    Cherry angioma    Multiple nevi    Abnormality of testosterone    Subclinical hypothyroidism    Anti-TPO antibodies present     Past Surgical History:   Procedure Laterality Date    APPENDECTOMY OPEN      EYE SURGERY      lasix    KNEE SURGERY      left knee    LASER HOLMIUM LITHOTRIPSY URETER(S), INSERT STENT, COMBINED Left 6/19/2020    Procedure: Cystoscopy, left retrograde pyelogram, interpretation of fluoroscopic images, left ureteroscopy with holmium lithotripsy and stone basketing, placement of A 5 x 28 double-J left ureteral stent.;  Surgeon: Andrew Chavarria MD;  Location: [facility] OR    ORTHOPEDIC SURGERY      TESTICLE SURGERY      VASECTOMY         Social History     Tobacco Use    Smoking status: Never    Smokeless tobacco: Never   Substance Use  "Topics    Alcohol use: Yes     Alcohol/week: 3.3 standard drinks of alcohol     Types: 4 Standard drinks or equivalent per week     Comment: 2-4 drinks per week     Family History   Problem Relation Age of Onset    Prostate Cancer Father         age 65    Diabetes Mother     Heart Failure Paternal Grandmother     Colon Cancer No family hx of          Current Outpatient Medications   Medication Sig Dispense Refill    fluticasone (FLONASE) 50 MCG/ACT nasal spray Spray 1 spray into both nostrils daily       levothyroxine (SYNTHROID/LEVOTHROID) 25 MCG tablet Take 1 tablet (25 mcg) by mouth daily. 90 tablet 2    loratadine 10 MG capsule Take 10 mg by mouth daily.      Multiple Vitamins-Minerals (MULTIVITAL) TABS Take 1 tablet by mouth daily           Review of Systems  Constitutional, neuro, ENT, endocrine, pulmonary, cardiac, gastrointestinal, genitourinary, musculoskeletal, integument and psychiatric systems are negative, except as otherwise noted.     Objective    Exam  /80 (BP Location: Right arm, Patient Position: Sitting, Cuff Size: Adult Large)   Pulse 51   Temp 97.1  F (36.2  C) (Temporal)   Resp 16   Ht 1.918 m (6' 3.5\")   Wt 118.4 kg (261 lb 1.6 oz)   SpO2 98%   BMI 32.20 kg/m     Estimated body mass index is 32.2 kg/m  as calculated from the following:    Height as of this encounter: 1.918 m (6' 3.5\").    Weight as of this encounter: 118.4 kg (261 lb 1.6 oz).    Physical Exam  GENERAL: alert and no distress  EYES: Eyes grossly normal to inspection, PERRL and conjunctivae and sclerae normal  HENT: ear canals and TM's normal, nose and mouth without ulcers or lesions  NECK: no adenopathy, no asymmetry, masses, or scars  RESP: lungs clear to auscultation - no rales, rhonchi or wheezes  CV: regular rate and rhythm, normal S1 S2, no S3 or S4, no murmur, click or rub, no peripheral edema  ABDOMEN: soft, nontender, no hepatosplenomegaly, no masses and bowel sounds normal  MS: no gross musculoskeletal " defects noted, no edema  SKIN: no suspicious lesions or rashes  NEURO: Normal strength and tone, mentation intact and speech normal  PSYCH: mentation appears normal, affect normal/bright        Signed Electronically by: Rodney Ribeiro MD

## 2025-04-14 NOTE — PATIENT INSTRUCTIONS
Patient Education   Preventive Care Advice   This is general advice given by our system to help you stay healthy. However, your care team may have specific advice just for you. Please talk to your care team about your preventive care needs.  Nutrition  Eat 5 or more servings of fruits and vegetables each day.  Try wheat bread, brown rice and whole grain pasta (instead of white bread, rice, and pasta).  Get enough calcium and vitamin D. Check the label on foods and aim for 100% of the RDA (recommended daily allowance).  Lifestyle  Exercise at least 150 minutes each week  (30 minutes a day, 5 days a week).  Do muscle strengthening activities 2 days a week. These help control your weight and prevent disease.  No smoking.  Wear sunscreen to prevent skin cancer.  Have a dental exam and cleaning every 6 months.  Yearly exams  See your health care team every year to talk about:  Any changes in your health.  Any medicines your care team has prescribed.  Preventive care, family planning, and ways to prevent chronic diseases.  Shots (vaccines)   HPV shots (up to age 26), if you've never had them before.  Hepatitis B shots (up to age 59), if you've never had them before.  COVID-19 shot: Get this shot when it's due.  Flu shot: Get a flu shot every year.  Tetanus shot: Get a tetanus shot every 10 years.  Pneumococcal, hepatitis A, and RSV shots: Ask your care team if you need these based on your risk.  Shingles shot (for age 50 and up)  General health tests  Diabetes screening:  Starting at age 35, Get screened for diabetes at least every 3 years.  If you are younger than age 35, ask your care team if you should be screened for diabetes.  Cholesterol test: At age 39, start having a cholesterol test every 5 years, or more often if advised.  Bone density scan (DEXA): At age 50, ask your care team if you should have this scan for osteoporosis (brittle bones).  Hepatitis C: Get tested at least once in your life.  STIs (sexually  transmitted infections)  Before age 24: Ask your care team if you should be screened for STIs.  After age 24: Get screened for STIs if you're at risk. You are at risk for STIs (including HIV) if:  You are sexually active with more than one person.  You don't use condoms every time.  You or a partner was diagnosed with a sexually transmitted infection.  If you are at risk for HIV, ask about PrEP medicine to prevent HIV.  Get tested for HIV at least once in your life, whether you are at risk for HIV or not.  Cancer screening tests  Cervical cancer screening: If you have a cervix, begin getting regular cervical cancer screening tests starting at age 21.  Breast cancer scan (mammogram): If you've ever had breasts, begin having regular mammograms starting at age 40. This is a scan to check for breast cancer.  Colon cancer screening: It is important to start screening for colon cancer at age 45.  Have a colonoscopy test every 10 years (or more often if you're at risk) Or, ask your provider about stool tests like a FIT test every year or Cologuard test every 3 years.  To learn more about your testing options, visit:   .  For help making a decision, visit:   https://bit.ly/uv36880.  Prostate cancer screening test: If you have a prostate, ask your care team if a prostate cancer screening test (PSA) at age 55 is right for you.  Lung cancer screening: If you are a current or former smoker ages 50 to 80, ask your care team if ongoing lung cancer screenings are right for you.  For informational purposes only. Not to replace the advice of your health care provider. Copyright   2023 [facility]. All rights reserved. Clinically reviewed by the [facility] Transitions Program. GetPrice 225965 - REV 01/24.  Eating Healthy Foods: Care Instructions  With every meal, you can make healthy food choices. Try to eat a variety of fruits, vegetables, whole grains, lean proteins, and low-fat dairy products. This can help  "you get the right balance of nutrients, including vitamins and minerals. Small changes add up over time. You can start by adding one healthy food to your meals each day.    Try to make half your plate fruits and vegetables, one-fourth whole grains, and one-fourth lean proteins. Try including dairy with your meals.   Eat more fruits and vegetables. Try to have them with most meals and snacks.   Foods for healthy eating        Fruits   These can be fresh, frozen, canned, or dried.  Try to choose whole fruit rather than fruit juice.  Eat a variety of colors.        Vegetables   These can be fresh, frozen, canned, or dried.  Beans, peas, and lentils count too.        Whole grains   Choose whole-grain breads, cereals, and noodles.  Try brown rice.        Lean proteins   These can include lean meat, poultry, fish, and eggs.  You can also have tofu, beans, peas, lentils, nuts, and seeds.        Dairy   Try milk, yogurt, and cheese.  Choose low-fat or fat-free when you can.  If you need to, use lactose-free milk or fortified plant-based milk products, such as soy milk.        Water   Drink water when you're thirsty.  Limit sugar-sweetened drinks, including soda, fruit drinks, and sports drinks.  Where can you learn more?  Go to https://www.YouTab.net/patiented  Enter T756 in the search box to learn more about \"Eating Healthy Foods: Care Instructions.\"  Current as of: October 7, 2024  Content Version: 14.4    6484-0844 [employer].   Care instructions adapted under license by your healthcare professional. If you have questions about a medical condition or this instruction, always ask your healthcare professional. [employer] disclaims any warranty or liability for your use of this information.       "

## 2025-04-15 LAB
ALBUMIN SERPL BCG-MCNC: 4.9 G/DL (ref 3.5–5.2)
ALP SERPL-CCNC: 75 U/L (ref 40–150)
ALT SERPL W P-5'-P-CCNC: 31 U/L (ref 0–70)
ANION GAP SERPL CALCULATED.3IONS-SCNC: 14 MMOL/L (ref 7–15)
AST SERPL W P-5'-P-CCNC: 31 U/L (ref 0–45)
BILIRUB SERPL-MCNC: 0.6 MG/DL
BUN SERPL-MCNC: 21.8 MG/DL (ref 6–20)
CALCIUM SERPL-MCNC: 9.2 MG/DL (ref 8.8–10.4)
CHLORIDE SERPL-SCNC: 102 MMOL/L (ref 98–107)
CHOLEST SERPL-MCNC: 206 MG/DL
CREAT SERPL-MCNC: 0.99 MG/DL (ref 0.67–1.17)
EGFRCR SERPLBLD CKD-EPI 2021: 89 ML/MIN/1.73M2
FASTING STATUS PATIENT QL REPORTED: NO
FASTING STATUS PATIENT QL REPORTED: NO
GLUCOSE SERPL-MCNC: 99 MG/DL (ref 70–99)
HCO3 SERPL-SCNC: 24 MMOL/L (ref 22–29)
HDLC SERPL-MCNC: 61 MG/DL
LDLC SERPL CALC-MCNC: 116 MG/DL
NONHDLC SERPL-MCNC: 145 MG/DL
POTASSIUM SERPL-SCNC: 4.3 MMOL/L (ref 3.4–5.3)
PROT SERPL-MCNC: 7.1 G/DL (ref 6.4–8.3)
PSA SERPL DL<=0.01 NG/ML-MCNC: 0.67 NG/ML (ref 0–3.5)
SODIUM SERPL-SCNC: 140 MMOL/L (ref 135–145)
TRIGL SERPL-MCNC: 144 MG/DL

## 2025-04-24 ENCOUNTER — LAB (OUTPATIENT)
Dept: LAB | Facility: CLINIC | Age: 55
End: 2025-04-24
Payer: COMMERCIAL

## 2025-04-24 DIAGNOSIS — E03.8 SUBCLINICAL HYPOTHYROIDISM: ICD-10-CM

## 2025-04-24 LAB
T4 FREE SERPL-MCNC: 0.97 NG/DL (ref 0.9–1.7)
TSH SERPL DL<=0.005 MIU/L-ACNC: 8.66 UIU/ML (ref 0.3–4.2)

## 2025-04-30 ENCOUNTER — OFFICE VISIT (OUTPATIENT)
Dept: ENDOCRINOLOGY | Facility: CLINIC | Age: 55
End: 2025-04-30
Payer: COMMERCIAL

## 2025-04-30 VITALS
BODY MASS INDEX: 31.53 KG/M2 | SYSTOLIC BLOOD PRESSURE: 126 MMHG | OXYGEN SATURATION: 97 % | WEIGHT: 258.9 LBS | TEMPERATURE: 98.1 F | RESPIRATION RATE: 16 BRPM | HEART RATE: 57 BPM | HEIGHT: 76 IN | DIASTOLIC BLOOD PRESSURE: 76 MMHG

## 2025-04-30 DIAGNOSIS — R79.89 ABNORMALITY OF TESTOSTERONE: ICD-10-CM

## 2025-04-30 DIAGNOSIS — R94.6 THYROID FUNCTION TEST ABNORMAL: ICD-10-CM

## 2025-04-30 DIAGNOSIS — R76.8 ANTI-TPO ANTIBODIES PRESENT: ICD-10-CM

## 2025-04-30 DIAGNOSIS — E03.8 SUBCLINICAL HYPOTHYROIDISM: Primary | ICD-10-CM

## 2025-04-30 PROCEDURE — 3078F DIAST BP <80 MM HG: CPT | Performed by: INTERNAL MEDICINE

## 2025-04-30 PROCEDURE — 3074F SYST BP LT 130 MM HG: CPT | Performed by: INTERNAL MEDICINE

## 2025-04-30 PROCEDURE — G2211 COMPLEX E/M VISIT ADD ON: HCPCS | Performed by: INTERNAL MEDICINE

## 2025-04-30 PROCEDURE — 99214 OFFICE O/P EST MOD 30 MIN: CPT | Performed by: INTERNAL MEDICINE

## 2025-04-30 RX ORDER — LEVOTHYROXINE SODIUM 50 UG/1
50 TABLET ORAL
Qty: 90 TABLET | Refills: 2 | Status: SHIPPED | OUTPATIENT
Start: 2025-04-30

## 2025-04-30 NOTE — PATIENT INSTRUCTIONS
[facility]  Dr Omer, Endocrinology Department    [facility]   [address] [facility]  [place] [address]  Appointment [phone]  Fax: [phone]  [place]: Monday - Thursday      Please check the cost coverage and copay with insurance before recommended tests, services and medications (especially if new medications are prescribed).     If ordered, please get blood work done 1 week prior to your next appointment so they will be available to Dr. Omer at your visit.      Increase levothyroxine to 50 mcg/day  Lab only in 2 months  Labs and follow up in 1 year.  Please make a lab appointment for blood work and follow up clinic appointment in 1 week after that to discuss results.    Take Levothyroxine on an empty stomach. Take it with a full glass of water at least 30 minutes to 1 hour before eating breakfast.   This medicine should be taken at least 4 hours before or 4 hours after these medicines: antacids (Maalox , Mylanta , Tums ), calcium supplements, cholestyramine (Prevalite , Questran ), colestipol (Colestid ), iron supplements, orlistat (Juan M , Xenical ), simethicone (Gas-X , Mylicon ), and sucralfate (Carafate ).   Swallow the capsule whole. Do not cut or crush it.

## 2025-04-30 NOTE — LETTER
4/30/2025      Riley Lagos  [address] [place] [address]      Dear Colleague,    Thank you for referring your patient, Riley Lagos, to the [facility]. Please see a copy of my visit note below.    Name: Riley Lagos  Seen for follow up of Testosterone/hypogonadism and subclinical hypothyroidism  HPI:  Riley Lagos is a 55 year old male who presents for the evaluation of low testosterone.   has a past medical history of Allergic rhinitis, Kidney stone, Pneumonia (04/2020), and Sinus bradycardia (05/29/2021).    4/2024 he had annual checkup and had fatigue, low sex drive, wt gain.  Not able to loose weight.  Wife is RN.   4/2024 labs showing low testosterone X 1.    He had COVID in early 2020.    Low testosterone: 4/2024 labs done with primary care provider (in the setting of fatigue and tiredness) showed low total testosterone 193.  Recheck labs 5/2020.  Normal total testosterone 415.  Other pituitary hormones showed normal prolactin levels.    Subclinical hypothyroidism (TPO positive):  4/2024 labs consistent with subclinical hypothyroidism.  Recheck 5/2024 labs above consistent with subclinical hypothyroidism and.  Positive TPO antibodies.    Started levothyroxine 25 mcg/day X 5/2024    Energy improved since starting levothyroxine.    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 He has 4 episodes since 1/2024. Was seen by optho. His wife is neurology NP.    Shave-No change  Hair Growth/Changes-No change  Muscle strength-Yes: noticed more weakness  OTC Herbal-No  Children-has 2 children.   Erectile dysfunction-has + Ed X few years  Decreased libido-Yes: low libido X few years.  Radiation Exposure, Mumps Orchitis, Cryptorchidism:No  H/o Torsions or Pelvic Trauma: No  Previous use of testosterone: No  + wt gain: gained about 10 lbs gradually in last 1 year.    FH of thyroid: mother, daughters with hypothyroidism.  He has never been on testosterone replacement.  No history  of of CAD or DVT.  H/o prostate cancer in father.  No FH of thyroid cancer  No history of radiation  No compressive s/s  No other major risk factors.    Patient feels well at this time and denies any tachycardia, palpitations, heat intolerance, tremor, insomnia, diarrhea, or unexplained weight loss.  Patient also denies  cold intolerance, constipation.  Wt Readings from Last 2 Encounters:   04/14/25 118.4 kg (261 lb 1.6 oz)   05/30/24 113.4 kg (250 lb)     PMH/PSH:  Past Medical History:   Diagnosis Date     Allergic rhinitis      Kidney stone      Pneumonia 04/2020     Sinus bradycardia 05/29/2021     Past Surgical History:   Procedure Laterality Date     APPENDECTOMY OPEN       EYE SURGERY      lasix     KNEE SURGERY      left knee     LASER HOLMIUM LITHOTRIPSY URETER(S), INSERT STENT, COMBINED Left 6/19/2020    Procedure: Cystoscopy, left retrograde pyelogram, interpretation of fluoroscopic images, left ureteroscopy with holmium lithotripsy and stone basketing, placement of A 5 x 28 double-J left ureteral stent.;  Surgeon: Andrew Chavarria MD;  Location: [facility] OR     ORTHOPEDIC SURGERY       TESTICLE SURGERY       VASECTOMY       Family Hx:  Family History   Problem Relation Age of Onset     Prostate Cancer Father         age 65     Diabetes Mother      Heart Failure Paternal Grandmother      Colon Cancer No family hx of        Social Hx:  Social History     Socioeconomic History     Marital status:      Spouse name: Not on file     Number of children: Not on file     Years of education: Not on file     Highest education level: Not on file   Occupational History     Not on file   Tobacco Use     Smoking status: Never     Smokeless tobacco: Never   Vaping Use     Vaping status: Never Used   Substance and Sexual Activity     Alcohol use: Yes     Alcohol/week: 3.3 standard drinks of alcohol     Types: 4 Standard drinks or equivalent per week     Comment: 2-4 drinks per week     Drug use: No     Sexual  activity: Yes     Partners: Female     Birth control/protection: Male Surgical   Other Topics Concern     Parent/sibling w/ CABG, MI or angioplasty before 65F 55M? No   Social History Narrative     Not on file     Social Drivers of Health     Financial Resource Strain: Low Risk  (4/11/2025)    Financial Resource Strain      Within the past 12 months, have you or your family members you live with been unable to get utilities (heat, electricity) when it was really needed?: No   Food Insecurity: Low Risk  (4/11/2025)    Food Insecurity      Within the past 12 months, did you worry that your food would run out before you got money to buy more?: No      Within the past 12 months, did the food you bought just not last and you didn t have money to get more?: No   Transportation Needs: Low Risk  (4/11/2025)    Transportation Needs      Within the past 12 months, has lack of transportation kept you from medical appointments, getting your medicines, non-medical meetings or appointments, work, or from getting things that you need?: No   Physical Activity: Insufficiently Active (4/11/2025)    Exercise Vital Sign      Days of Exercise per Week: 5 days      Minutes of Exercise per Session: 20 min   Stress: No Stress Concern Present (4/11/2025)    [place] [place] [employer] - Occupational Stress Questionnaire      Feeling of Stress : Not at all   Social Connections: Unknown (4/11/2025)    Social Connection and Isolation Panel [NHANES]      Frequency of Communication with Friends and Family: Not on file      Frequency of Social Gatherings with Friends and Family: Once a week      Attends Restoration Services: Not on file      Active Member of Clubs or Organizations: Not on file      Attends Club or Organization Meetings: Not on file      Marital Status: Not on file   Interpersonal Safety: Low Risk  (4/14/2025)    Interpersonal Safety      Do you feel physically and emotionally safe where you currently live?: Yes       Within the past 12 months, have you been hit, slapped, kicked or otherwise physically hurt by someone?: No      Within the past 12 months, have you been humiliated or emotionally abused in other ways by your partner or ex-partner?: No   Housing Stability: Low Risk  (4/11/2025)    Housing Stability      Do you have housing? : Yes      Are you worried about losing your housing?: No          MEDICATIONS:  has a current medication list which includes the following prescription(s): fluticasone, levothyroxine, loratadine, and multivital.    ROS     ROS: 10 point ROS neg other than the symptoms noted above in the HPI.    Physical Exam   VS: There were no vitals taken for this visit.  GENERAL: healthy, alert and no distress  EYES: Eyes grossly normal to inspection, conjunctivae and sclerae normal  ENT: no nose swelling, nasal discharge.  Thyroid: no apparent thyroid nodules.  Thyroid appears normal in size and nontender.  CV: RRR, no rubs, gallops, no murmurs  RESP: CTAB, no wheezes, rales, or ronchi  ABDO: +BS  EXTREMITIES: no hand tremors.  NEURO: Cranial nerves grossly intact, mentation intact and speech normal  SKIN: No apparent skin lesions, rash or edema seen   PSYCH: mentation appears normal, affect normal/bright, judgement and insight intact, normal speech and appearance well-groomed    LABS:  TFTs:   Latest Ref Rng 4/24/2025  8:17 AM   ENDO THYROID LABS-[facility]     TSH 0.30 - 4.20 uIU/mL 8.66 (H)    FREE T4 0.90 - 1.70 ng/dL 0.97       Testosterone:  Component      Latest Ref Rng 4/10/2024  10:05 AM   Free Testosterone Calculated      ng/dL 3.46    Testosterone Total      240 - 950 ng/dL 183 (L)       FSH/LH:    CBC:    PSA:      All pertinent notes, labs, and images personally reviewed by me.     A/P  Mr.Robert KALPANA Lagos is a 54 year old here for the evaluation of hypogonadism.    1. Low Testosterone:  Pulsatile secretion of GnRH from the hypothalamus is required for both the initiation and maintenance of the  reproductive axis.  GnRH stimulates the synthesis of LH and FSH.  FSH/LH  are responsible for testosterone production and spermatogenesis as well as systemic testosterone secretion and virilization.   Low testosterone X1.  Recheck testosterone labs (5/2024) were in normal range.  He has fatigue, low libido symptoms.  Never been on testosterone replacement.  No history of CAD, prostate problem or DVT.  + Family history of prostate cancer in his father  Plan:  Discussed diagnosis, pathophysiology, management and treatment options of condition with pt.  Discussed primary and secondary hypogonadism  5/2024 labs showing normal testosterone level.  Discussed that as testosterone levels are in normal range, testosterone hormone replacement is not indicated.  Plan to continue monitor.    2.  Subclinical hypothyroidism (+TPO):  Strong family history of hypothyroidism as noted above  Currently taking  levothyroxine to 25 mcg/day  Plan:  Discussed diagnosis, pathophysiology, management and treatment options of condition with pt.  Based on 4/2025 labs-- Increase levothyroxine to 50 mcg/day  Lab only in 2 months  Labs and follow up in 1 year.  Please make a lab appointment for blood work and follow up clinic appointment in 1 week after that to discuss results.          Primary hypogonadism (Klinefelter's syndrome) is characterized by a low serum testosterone level/oligo- or azoospermia with elevated serum LH and FSH concentrations. Secondary hypogonadism is diagnosed in the setting of a low testosterone level and sperm count with  low or inappropriately normal serum LH and FSH concentrations.    Discussed indications, risks and benefits of all medications prescribed, and answered questions to patient's satisfaction.  The longitudinal plan of care for the diagnosis(es)/condition(s) as documented were addressed during this visit. Due to the added complexity in care, I will continue to support Jb in the subsequent management and  with ongoing continuity of care.  All questions were answered.  The patient indicates understanding of the above issues and agrees with the plan set forth.      Follow-up:  As noted in AVS.    Vicki Omer MD  Endocrinology   [facility]    CC: Rodney Ribeiro       Again, thank you for allowing me to participate in the care of your patient.        Sincerely,        Vicki Omer MD    Electronically signed

## 2025-04-30 NOTE — PROGRESS NOTES
Name: Riley Lagos  Seen for follow up of Testosterone/hypogonadism and subclinical hypothyroidism  HPI:  Riley Lagos is a 55 year old male who presents for the evaluation of low testosterone.   has a past medical history of Allergic rhinitis, Kidney stone, Pneumonia (04/2020), and Sinus bradycardia (05/29/2021).    4/2024 he had annual checkup and had fatigue, low sex drive, wt gain.  Not able to loose weight.  Wife is RN.   4/2024 labs showing low testosterone X 1.    He had COVID in early 2020.    Low testosterone: 4/2024 labs done with primary care provider (in the setting of fatigue and tiredness) showed low total testosterone 193.  Recheck labs 5/2020.  Normal total testosterone 415.  Other pituitary hormones showed normal prolactin levels.    Subclinical hypothyroidism (TPO positive):  4/2024 labs consistent with subclinical hypothyroidism.  Recheck 5/2024 labs above consistent with subclinical hypothyroidism and.  Positive TPO antibodies.    Started levothyroxine 25 mcg/day X 5/2024    Energy improved since starting levothyroxine.    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 He has 4 episodes since 1/2024. Was seen by optho. His wife is neurology NP.    Shave-No change  Hair Growth/Changes-No change  Muscle strength-Yes: noticed more weakness  OTC Herbal-No  Children-has 2 children.   Erectile dysfunction-has + Ed X few years  Decreased libido-Yes: low libido X few years.  Radiation Exposure, Mumps Orchitis, Cryptorchidism:No  H/o Torsions or Pelvic Trauma: No  Previous use of testosterone: No  + wt gain: gained about 10 lbs gradually in last 1 year.    FH of thyroid: mother, daughters with hypothyroidism.  He has never been on testosterone replacement.  No history of of CAD or DVT.  H/o prostate cancer in father.  No FH of thyroid cancer  No history of radiation  No compressive s/s  No other major risk factors.    Patient feels well at this time and denies any tachycardia, palpitations, heat  intolerance, tremor, insomnia, diarrhea, or unexplained weight loss.  Patient also denies  cold intolerance, constipation.  Wt Readings from Last 2 Encounters:   04/14/25 118.4 kg (261 lb 1.6 oz)   05/30/24 113.4 kg (250 lb)     PMH/PSH:  Past Medical History:   Diagnosis Date    Allergic rhinitis     Kidney stone     Pneumonia 04/2020    Sinus bradycardia 05/29/2021     Past Surgical History:   Procedure Laterality Date    APPENDECTOMY OPEN      EYE SURGERY      lasix    KNEE SURGERY      left knee    LASER HOLMIUM LITHOTRIPSY URETER(S), INSERT STENT, COMBINED Left 6/19/2020    Procedure: Cystoscopy, left retrograde pyelogram, interpretation of fluoroscopic images, left ureteroscopy with holmium lithotripsy and stone basketing, placement of A 5 x 28 double-J left ureteral stent.;  Surgeon: Andrew Chavarria MD;  Location: [facility] OR    ORTHOPEDIC SURGERY      TESTICLE SURGERY      VASECTOMY       Family Hx:  Family History   Problem Relation Age of Onset    Prostate Cancer Father         age 65    Diabetes Mother     Heart Failure Paternal Grandmother     Colon Cancer No family hx of        Social Hx:  Social History     Socioeconomic History    Marital status:      Spouse name: Not on file    Number of children: Not on file    Years of education: Not on file    Highest education level: Not on file   Occupational History    Not on file   Tobacco Use    Smoking status: Never    Smokeless tobacco: Never   Vaping Use    Vaping status: Never Used   Substance and Sexual Activity    Alcohol use: Yes     Alcohol/week: 3.3 standard drinks of alcohol     Types: 4 Standard drinks or equivalent per week     Comment: 2-4 drinks per week    Drug use: No    Sexual activity: Yes     Partners: Female     Birth control/protection: Male Surgical   Other Topics Concern    Parent/sibling w/ CABG, MI or angioplasty before 65F 55M? No   Social History Narrative    Not on file     Social Drivers of Health     Financial Resource  Strain: Low Risk  (4/11/2025)    Financial Resource Strain     Within the past 12 months, have you or your family members you live with been unable to get utilities (heat, electricity) when it was really needed?: No   Food Insecurity: Low Risk  (4/11/2025)    Food Insecurity     Within the past 12 months, did you worry that your food would run out before you got money to buy more?: No     Within the past 12 months, did the food you bought just not last and you didn t have money to get more?: No   Transportation Needs: Low Risk  (4/11/2025)    Transportation Needs     Within the past 12 months, has lack of transportation kept you from medical appointments, getting your medicines, non-medical meetings or appointments, work, or from getting things that you need?: No   Physical Activity: Insufficiently Active (4/11/2025)    Exercise Vital Sign     Days of Exercise per Week: 5 days     Minutes of Exercise per Session: 20 min   Stress: No Stress Concern Present (4/11/2025)    [place] [place] [employer] - Occupational Stress Questionnaire     Feeling of Stress : Not at all   Social Connections: Unknown (4/11/2025)    Social Connection and Isolation Panel [NHANES]     Frequency of Communication with Friends and Family: Not on file     Frequency of Social Gatherings with Friends and Family: Once a week     Attends Taoism Services: Not on file     Active Member of Clubs or Organizations: Not on file     Attends Club or Organization Meetings: Not on file     Marital Status: Not on file   Interpersonal Safety: Low Risk  (4/14/2025)    Interpersonal Safety     Do you feel physically and emotionally safe where you currently live?: Yes     Within the past 12 months, have you been hit, slapped, kicked or otherwise physically hurt by someone?: No     Within the past 12 months, have you been humiliated or emotionally abused in other ways by your partner or ex-partner?: No   Housing Stability: Low Risk  (4/11/2025)     Housing Stability     Do you have housing? : Yes     Are you worried about losing your housing?: No          MEDICATIONS:  has a current medication list which includes the following prescription(s): fluticasone, levothyroxine, loratadine, and multivital.    ROS     ROS: 10 point ROS neg other than the symptoms noted above in the HPI.    Physical Exam   VS: There were no vitals taken for this visit.  GENERAL: healthy, alert and no distress  EYES: Eyes grossly normal to inspection, conjunctivae and sclerae normal  ENT: no nose swelling, nasal discharge.  Thyroid: no apparent thyroid nodules.  Thyroid appears normal in size and nontender.  CV: RRR, no rubs, gallops, no murmurs  RESP: CTAB, no wheezes, rales, or ronchi  ABDO: +BS  EXTREMITIES: no hand tremors.  NEURO: Cranial nerves grossly intact, mentation intact and speech normal  SKIN: No apparent skin lesions, rash or edema seen   PSYCH: mentation appears normal, affect normal/bright, judgement and insight intact, normal speech and appearance well-groomed    LABS:  TFTs:   Latest Ref Rng 4/24/2025  8:17 AM   ENDO THYROID LABS-[facility]     TSH 0.30 - 4.20 uIU/mL 8.66 (H)    FREE T4 0.90 - 1.70 ng/dL 0.97       Testosterone:  Component      Latest Ref Rng 4/10/2024  10:05 AM   Free Testosterone Calculated      ng/dL 3.46    Testosterone Total      240 - 950 ng/dL 183 (L)       FSH/LH:    CBC:    PSA:      All pertinent notes, labs, and images personally reviewed by me.     A/P  Mr.Robert KALPANA Lagos is a 54 year old here for the evaluation of hypogonadism.    1. Low Testosterone:  Pulsatile secretion of GnRH from the hypothalamus is required for both the initiation and maintenance of the reproductive axis.  GnRH stimulates the synthesis of LH and FSH.  FSH/LH  are responsible for testosterone production and spermatogenesis as well as systemic testosterone secretion and virilization.   Low testosterone X1.  Recheck testosterone labs (5/2024) were in normal range.  He has  fatigue, low libido symptoms.  Never been on testosterone replacement.  No history of CAD, prostate problem or DVT.  + Family history of prostate cancer in his father  Plan:  Discussed diagnosis, pathophysiology, management and treatment options of condition with pt.  Discussed primary and secondary hypogonadism  5/2024 labs showing normal testosterone level.  Discussed that as testosterone levels are in normal range, testosterone hormone replacement is not indicated.  Plan to continue monitor.    2.  Subclinical hypothyroidism (+TPO):  Strong family history of hypothyroidism as noted above  Currently taking  levothyroxine to 25 mcg/day  Plan:  Discussed diagnosis, pathophysiology, management and treatment options of condition with pt.  Based on 4/2025 labs-- Increase levothyroxine to 50 mcg/day  Lab only in 2 months  Labs and follow up in 1 year.  Please make a lab appointment for blood work and follow up clinic appointment in 1 week after that to discuss results.          Primary hypogonadism (Klinefelter's syndrome) is characterized by a low serum testosterone level/oligo- or azoospermia with elevated serum LH and FSH concentrations. Secondary hypogonadism is diagnosed in the setting of a low testosterone level and sperm count with  low or inappropriately normal serum LH and FSH concentrations.    Discussed indications, risks and benefits of all medications prescribed, and answered questions to patient's satisfaction.  The longitudinal plan of care for the diagnosis(es)/condition(s) as documented were addressed during this visit. Due to the added complexity in care, I will continue to support Jb in the subsequent management and with ongoing continuity of care.  All questions were answered.  The patient indicates understanding of the above issues and agrees with the plan set forth.      Follow-up:  As noted in AVS.    Vicki Omer MD  Endocrinology   [facility]    CC: Rodney Ribeiro

## 2025-05-18 NOTE — PROGRESS NOTES
AUDIOLOGY REPORT    SUBJECTIVE:  Riley Lagos is a 55 year old male who was seen on 5/29/25 in the Audiology Clinic at the [facility] for audiologic evaluation, referred by Rodney Ribeiro M.D.     The patient has gradually been noting increasing hearing difficulty.  There is a family history of hearing loss.  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 He denies tinnitus, dizziness, ear pain, drainage from ears, aural fullness, or history of ear surgery.     OBJECTIVE:  Falls Risk Screening  Falls Risk Completed by: Audiology   Have you fallen 2 or more times in the past year? No  Have you fallen and had an injury in the past year? No  Is the patient receiving Physical Therapy services? No  Fall Screen Comments:      Otoscopic exam indicates ears are clear of cerumen bilaterally.      Pure Tone Thresholds assessed using conventional audiometry with good reliability from 250-8000 Hz bilaterally using insert earphones and circumaural headphones.      RIGHT: Normal hearing     LEFT: Normal hearing     Tympanogram:    RIGHT: normal eardrum mobility    LEFT:   normal eardrum mobility    Reflexes (reported by stimulus ear):  RIGHT: Ipsilateral is present at normal levels  RIGHT: Contralateral is present at normal levels  LEFT:   Ipsilateral is present at normal levels  LEFT:   Contralateral is present at normal levels      Speech Reception Threshold:    RIGHT: 15 dB HL    LEFT:   15 dB HL  Word Recognition Score:     RIGHT: 100% at 55 dB HL using NU-6 recorded word list.    LEFT:   100% at 55 dB HL using NU-6 recorded word list.      ASSESSMENT:   Normal hearing bilaterally  Normal middle ear function bilaterally    Today s results were discussed with the patient in detail.     PLAN:     Recheck hearing if changes.    Provided handout on communication strategies.    Encouraged use of hearing protection.    Follow up  with PCP.     The patient expressed understanding and agreement with this plan.    Adelaida Boyle, CCC-A, [facility]  Licensed Audiologist  [place] #[id number]    Cc Rodney Ribeiro M.D.

## 2025-05-29 ENCOUNTER — OFFICE VISIT (OUTPATIENT)
Dept: AUDIOLOGY | Facility: CLINIC | Age: 55
End: 2025-05-29
Payer: COMMERCIAL

## 2025-05-29 DIAGNOSIS — H91.93 SUBJECTIVE HEARING CHANGE OF BOTH EARS: Primary | ICD-10-CM

## 2025-05-29 DIAGNOSIS — Z01.10 NORMAL HEARING EXAM: ICD-10-CM

## 2025-05-29 DIAGNOSIS — H91.90 HEARING LOSS, UNSPECIFIED HEARING LOSS TYPE, UNSPECIFIED LATERALITY: ICD-10-CM

## 2025-06-08 ASSESSMENT — SLEEP AND FATIGUE QUESTIONNAIRES
HOW LIKELY ARE YOU TO NOD OFF OR FALL ASLEEP IN A CAR, WHILE STOPPED FOR A FEW MINUTES IN TRAFFIC: WOULD NEVER DOZE
HOW LIKELY ARE YOU TO NOD OFF OR FALL ASLEEP WHILE SITTING AND READING: SLIGHT CHANCE OF DOZING
HOW LIKELY ARE YOU TO NOD OFF OR FALL ASLEEP WHILE SITTING AND TALKING TO SOMEONE: WOULD NEVER DOZE
HOW LIKELY ARE YOU TO NOD OFF OR FALL ASLEEP WHILE LYING DOWN TO REST IN THE AFTERNOON WHEN CIRCUMSTANCES PERMIT: WOULD NEVER DOZE
HOW LIKELY ARE YOU TO NOD OFF OR FALL ASLEEP WHILE WATCHING TV: WOULD NEVER DOZE
HOW LIKELY ARE YOU TO NOD OFF OR FALL ASLEEP WHILE SITTING QUIETLY AFTER LUNCH WITHOUT ALCOHOL: WOULD NEVER DOZE
HOW LIKELY ARE YOU TO NOD OFF OR FALL ASLEEP WHILE SITTING INACTIVE IN A PUBLIC PLACE: WOULD NEVER DOZE
HOW LIKELY ARE YOU TO NOD OFF OR FALL ASLEEP WHEN YOU ARE A PASSENGER IN A CAR FOR AN HOUR WITHOUT A BREAK: SLIGHT CHANCE OF DOZING

## 2025-06-09 ENCOUNTER — OFFICE VISIT (OUTPATIENT)
Dept: SURGERY | Facility: CLINIC | Age: 55
End: 2025-06-09
Payer: COMMERCIAL

## 2025-06-09 VITALS
BODY MASS INDEX: 31.76 KG/M2 | HEIGHT: 76 IN | OXYGEN SATURATION: 95 % | HEART RATE: 54 BPM | DIASTOLIC BLOOD PRESSURE: 83 MMHG | SYSTOLIC BLOOD PRESSURE: 131 MMHG | WEIGHT: 260.8 LBS

## 2025-06-09 DIAGNOSIS — E66.09 CLASS 1 OBESITY DUE TO EXCESS CALORIES WITHOUT SERIOUS COMORBIDITY WITH BODY MASS INDEX (BMI) OF 31.0 TO 31.9 IN ADULT: Primary | ICD-10-CM

## 2025-06-09 DIAGNOSIS — E66.811 CLASS 1 OBESITY DUE TO EXCESS CALORIES WITHOUT SERIOUS COMORBIDITY WITH BODY MASS INDEX (BMI) OF 31.0 TO 31.9 IN ADULT: Primary | ICD-10-CM

## 2025-06-09 DIAGNOSIS — E03.8 SUBCLINICAL HYPOTHYROIDISM: ICD-10-CM

## 2025-06-09 PROCEDURE — 3075F SYST BP GE 130 - 139MM HG: CPT | Performed by: PHYSICIAN ASSISTANT

## 2025-06-09 PROCEDURE — 99205 OFFICE O/P NEW HI 60 MIN: CPT | Performed by: PHYSICIAN ASSISTANT

## 2025-06-09 PROCEDURE — 3079F DIAST BP 80-89 MM HG: CPT | Performed by: PHYSICIAN ASSISTANT

## 2025-06-09 PROCEDURE — G2211 COMPLEX E/M VISIT ADD ON: HCPCS | Performed by: PHYSICIAN ASSISTANT

## 2025-06-09 RX ORDER — PHENTERMINE HYDROCHLORIDE 37.5 MG/1
TABLET ORAL
Qty: 90 TABLET | Refills: 1 | Status: SHIPPED | OUTPATIENT
Start: 2025-06-09

## 2025-06-09 NOTE — PATIENT INSTRUCTIONS
Nice to talk with you today. Below is the plan discussed.-  RENNY Menendez      Pt Instructions:  Start Phentermine  Take 1/2 tablet in the am.  If tolerating but hunger not controlled can increase to 1 tablet anytime after 2 wks.    Check blood pressure/pulse checked in 2 weeks and anytime you have a dose increase. Send result through [employer].     Labs ordered.  Please call [phone] to set up a lab appt.       Goals:  Have something for protein   for breakfast    Protein Supplements  Bar and Shakes:  Look for (per serving):  15-30 grams protein  Less than 10 grams total carbohydrate      Follow up:    Call [phone] to schedule next visit in Sept and Nov.     PHENTERMINE    We are starting Phentermine.  Our patients on Phentermine find that they:    >feel less hunger    >find it easier to push the plate away   >have an easier time eating less    For some of our patients, these feelings are very real and immediate. For other patients, the feelings are less obvious. They don't feel much of a change but find they've lost weight. Like all weight loss medications, Phentermine  works best when you help it work. This means:  1. Having less tempting high calorie (fattening) food around the house or office. (For people with strong cravings this is very important.)   2. Staying away from situations or people that may trigger your cravings .   3. Eating out only one time or less each week.  4. Eating your meals at a table with the TV or computer off.    Side-effects. Phentermine is generally well tolerated. The most common side effects are dry mouth and constipation. Because it is a stimulant, pts can have feelings of racing pulse or rapid heart beat. Some people can get an elevated blood pressure. Because of this we ask you to get your blood pressure and pulse checked within 1-2 weeks of starting the medication.     For any questions or concerns please send a [facility] message to our team or call our weight management  call center at [phone] during regular business hours. For questions during evenings or weekends your messages will be addressed during the next business day.  For emergencies please call 911 or seek immediate medical care.

## 2025-06-09 NOTE — ASSESSMENT & PLAN NOTE
Continue treatment protocol with Endocrinologist. On 50 mg levothyroxine.   TSH: 4/24/25 8.66    Anticipate improvement with weight loss once euthyroid and additional AOM

## 2025-06-09 NOTE — PROGRESS NOTES
"New Medical Weight Management Consult    PATIENT:  Riley Lagos   MRN:         [id number]   :         1970  RODNEY:         2025      Dear Rodney Ribeiro MD,    I had the pleasure of seeing your patient, Riley Lagos. Full intake/assessment was done to determine barriers to weight loss success and develop a treatment plan. Riley Lagos is a 55 year old male interested in treatment of medical problems associated with excess weight. He has a height of 6' 4\", a weight of 260 lbs 12.8 oz, and the calculated Body mass index is 31.75 kg/m .    Assessment & Plan   Problem List Items Addressed This Visit       Subclinical hypothyroidism    Continue treatment protocol with Endocrinologist. On 50 mg levothyroxine.   TSH: 25 8.66    Anticipate improvement with weight loss once euthyroid and additional AOM           Class 1 obesity due to excess calories without serious comorbidity with body mass index (BMI) of 31.0 to 31.9 in adult - Primary    2025 [facility] Initial . Patient doing well with activity nutrition sleep and stress. Phentermine start: Pt will check BP/P 2 wks after starting and send result through [employer].  RD referral, Labs     Goals:  Have something for protein for breakfast             Relevant Medications    phentermine (ADIPEX-P) 37.5 MG tablet    Other Relevant Orders    Hemoglobin A1c    Adult [facility] Referral        PROGRAM OVERVIEW  Reviewed options at [facility].   All questions were answered. Education provided on chronic disease management of obesity.    MEDICATIONS:  We discussed healthy habits to assist with weight loss. We reviewed medications associated with weight gain. We discussed the role of pharmacological agents in the treatment of obesity and the \"off-label\" use of medications in this practice. We reviewed medication that may assist with weight loss. Indications, contraindications, risks/benefits, and potential side effects were " "discussed. Explained medications must always be used together with lifestyle changes. Reviewed rationale for long term use of pharmacotherapy in chronic disease management for obesity.      AOM Considerations: 6/9/2025   Phentermine:  Candidate  Topiramate:  History of kidney stone x 4  GLP-1:  High co-pay   Naltrexone:  Candidate no cravings,   Wellbutrin:  Candidate awaiting hemoglobin A1c,   Metformin:  Candidate            PATIENT INSTRUCTIONS:  Start Phentermine  Take 1/2 tablet in the am.  If tolerating but hunger not controlled can increase to 1 tablet anytime after 2 wks.    Check blood pressure/pulse checked in 2 weeks and anytime you have a dose increase. Send result through [employer].     Labs ordered.  Please call [phone] to set up a lab appt.       Goals:  Have something for protein   for breakfast    Protein Supplements  Bar and Shakes:  Look for (per serving):  15-30 grams protein  Less than 10 grams total carbohydrate      Follow up:    Call [phone] to schedule next visit in Sept and Nov.     60 minutes spent on the date of the encounter doing chart review, history and exam, review test results, counseling, developing plan of care, documentation, and further activities as noted above.      He has the following co-morbidities:        6/8/2025     9:39 PM   --   I have the following health issues associated with obesity Hypothyroidism   I have the following symptoms associated with obesity None of the above           6/8/2025     9:39 PM   Referring Provider   Please name the provider who referred you to Medical Weight Management  If you do not know, please answer \"I Don't Know\" Rodney Gema           6/8/2025     9:39 PM   Weight History   How concerned are you about your weight? Very Concerned   I became overweight As an Adult   The following factors have contributed to my weight gain Eating Too Much   I have tried the following methods to lose weight Watching Portions or Calories    Exercise "   My highest weight since age 17 was 260   The most weight I have ever lost was (lbs) 20   I have the following family history of obesity/being overweight I am the only one in my immediate family who is overweight   How has your weight changed over the last year? Gained   How many pounds? 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  After college was less physically active.  Still works out 5 days a [employer].  Was diagnosed with hypothyroidism.  Weight has be increased the most in the last year.      [place]- no hx of.  Sleep study negative.           6/8/2025     9:39 PM   Diet Recall Review with Patient   If you do eat breakfast, what types of food do you eat?     [place]ake [place] up the morning 5:30 am  7:00 am overnight oats   If you do eat lunch, what types of food do you typically eat? 11:30 am [place] or salad s   If you do eat supper, what types of food do you typically eat? Variety, grilled chicken breasts with caesar salad   If you do snack, what types of food do you typically eat? Triscuits, cheese when making dinner   How many glasses of juice do you drink in a typical day? 0   How many of glasses of milk do you drink in a typical day? 0   How many 8oz glasses of sugar containing drinks such as Brett-Aid/sweet tea do you drink in a day? 0   How many cans/bottles of sugar pop/soda/tea/sports drinks do you drink in a day? 0   How many cans/bottles of diet pop/soda/tea or sports drink do you drink in a day? 0   How often do you have a drink of alcohol? 2-3 TImes a Week   If you do drink, how many drinks might you have in a day? 3-4   Friday and Saturdays- White wine, martini, old fashion    Pt Lactose intolerant        6/8/2025     9:39 PM   Eating Habits   Generally, my meals include foods like these bread, pasta, rice, potatoes, corn, crackers, sweet dessert, pop, or juice A Few Times a Week   Generally, my meals include foods like these fried meats, brats, burgers, french fries, pizza, cheese, chips, or ice cream A Few Times a  Week   Eat fast food (like McDonalds, Burger Kulwinder, Taco Bell) Never   Eat at a buffet or sit-down restaurant Once a Week   Eat most of my meals in front of the TV or computer A Few Times a Week   Often skip meals, eat at random times, have no regular eating times Never   Rarely sit down for a meal but snack or graze throughout Never   Eat extra snacks between meals Almost Everyday, when making supper   Eat most of my food at the end of the day Almost Everyday, supper is biggest doing with eating at 7 pm   Eat in the middle of the night or wake up at night to eat Never   Eat extra snacks to prevent or correct low blood sugar Never   Eat to prevent acid reflux or stomach pain Never   Worry about not having enough food to eat Never   I eat when I am depressed Never   I eat when I am stressed Never   I eat when I am bored Never   I eat when I am anxious Never   I eat when I am happy or as a reward Never   I feel hungry all the time even if I just have eaten Never   Feeling full is important to me A Few Times a Week   I finish all the food on my plate even if I am already full Almost Everyday   I can't resist eating delicious food or walk past the good food/smell Never   I eat/snack without noticing that I am eating Never   I eat when I am preparing the meal A Few Times a Week   I eat more than usual when I see others eating Never   I have trouble not eating sweets, ice cream, cookies, or chips if they are around the house Never   I think about food all day Never   What foods, if any, do you crave? Chips/Crackers           6/8/2025     9:39 PM   Amount of Food   I feel out of control when eating Never   I eat a large amount of food, like a loaf of bread, a box of cookies, a pint/quart of ice cream, all at once Never   I eat a large amount of food even when I am not hungry Never   I eat rapidly Monthly   I eat alone because I feel embarrassed and do not want others to see how much I have eaten Never   I eat until I am  uncomfortably full Never   I feel bad, disgusted, or guilty after I overeat Monthly           6/8/2025     9:39 PM   Activity/Exercise History   How much of a typical 12 hour day do you spend sitting? Half the Day   How much of a typical 12 hour day do you spend lying down? Less Than Half the Day   How much of a typical day do you spend walking/standing? Less Than Half the Day   How many hours (not including work) do you spend on the TV/Video Games/Computer/Tablet/Phone? 1 Hour or Less   How many times a week are you active for the purpose of exercise? 4-5 TImes a Week   What keeps you from being more active? Lack of Time   How many total minutes do you spend doing some activity for the purpose of exercising when you exercise? 15-30 Minutes   [place], rowing machine yoga- alternate    PAST MEDICAL HISTORY:  Past Medical History:   Diagnosis Date    Allergic rhinitis     Kidney stone     Pneumonia 04/2020    Sinus bradycardia 05/29/2021 6/8/2025     9:39 PM   Work/Social History Reviewed With Patient   My employment status is Full-Time   My job is Office   How much of your job is spent on the computer or phone? 50%- hybrid   How many hours do you spend commuting to work daily? 4.5   What is your marital status? /In a Relationship   If in a relationship, is your significant other overweight? Yes   If you have children, are they overweight? Yes, Youngest daughter in college living at home, oldest is out of the house.    Who do you live with? Spouse   Who does the food shopping? 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     Social History     Tobacco Use    Smoking status: Never    Smokeless tobacco: Never   Vaping Use    Vaping status: Never Used   Substance Use Topics    Alcohol use: Yes     Alcohol/week: 3.3 standard drinks of alcohol     Types: 4 Standard drinks or equivalent per week     Comment: 2-4 drinks per week    Drug use: No            6/8/2025      9:39 PM   Mental Health History Reviewed With Patient   Have you ever been physically or sexually abused? No   How often in the past 2 weeks have you felt little interest or pleasure in doing things? Not at all   Over the past 2 weeks how often have you felt down, depressed, or hopeless? Not at all           6/8/2025     9:39 PM   Questions Reviewed With Patient   How ready are you to make changes regarding your weight? Number 1 = Not ready at all to make changes up to 10 = very ready. 10   How confident are you that you can change? 1 = Not confident that you will be successful making changes up to 10 = very confident. 10           6/8/2025     9:39 PM   Sleep History Reviewed With Patient   How many hours do you sleep at night? 7       MEDICATIONS:   Current Outpatient Medications   Medication Sig Dispense Refill    fluticasone (FLONASE) 50 MCG/ACT nasal spray Spray 1 spray into both nostrils daily       levothyroxine (SYNTHROID/LEVOTHROID) 50 MCG tablet Take 1 tablet (50 mcg) by mouth every morning (before breakfast). 90 tablet 2    loratadine 10 MG capsule Take 10 mg by mouth daily.      Multiple Vitamins-Minerals (MULTIVITAL) TABS Take 1 tablet by mouth daily      phentermine (ADIPEX-P) 37.5 MG tablet Take 1/2 tablet in the morning for 7 days may increase to a full tablet if tolerating and needed. 90 tablet 1       ALLERGIES:   Allergies   Allergen Reactions    Cats      Red itchy eyes     Dogs      Red itchy eyes     Dust Mites      Runny nose and dizzy       ROS:    HEENT  H/O glaucoma:  no  Cardiovascular  CAD:   no  Palpitations:   no  HTN:    no  Gastrointestinal  GERD:   occasinally  Constipation:   no  Liver Dz:   no  H/O Pancreatitis:  no  H/O Gallbladder Dz: no  Psychiatric  Moods Stable:  yes  Anxiety:   no  Depression:  no  Bipolar:  no  H/O ETOH/Drug abuse: no  H/O eating disorder: no  Endocrine  PMH/FMH of MTC or MEN2:  no  Neurologic:  H/O seizures:   no  Headaches:  no  Memory Impairment:   no    H/O kidney stones:  Yes- 4 episodes in mid 1990's  Kidney disease:  no  :      LABS/RECORDS REVIEWED:  TSH   Date Value Ref Range Status   04/24/2025 8.66 (H) 0.30 - 4.20 uIU/mL Final     Sodium   Date Value Ref Range Status   04/14/2025 140 135 - 145 mmol/L Final   06/11/2021 141 133 - 144 mmol/L Final     Potassium   Date Value Ref Range Status   04/14/2025 4.3 3.4 - 5.3 mmol/L Final   06/11/2021 4.8 3.4 - 5.3 mmol/L Final     Chloride   Date Value Ref Range Status   04/14/2025 102 98 - 107 mmol/L Final   06/11/2021 108 94 - 109 mmol/L Final     Carbon Dioxide   Date Value Ref Range Status   06/11/2021 30 20 - 32 mmol/L Final     Carbon Dioxide (CO2)   Date Value Ref Range Status   04/14/2025 24 22 - 29 mmol/L Final     Anion Gap   Date Value Ref Range Status   04/14/2025 14 7 - 15 mmol/L Final   06/11/2021 3 3 - 14 mmol/L Final     Glucose   Date Value Ref Range Status   04/14/2025 99 70 - 99 mg/dL Final   06/11/2021 67 (L) 70 - 99 mg/dL Final     Comment:     Fasting specimen     Urea Nitrogen   Date Value Ref Range Status   04/14/2025 21.8 (H) 6.0 - 20.0 mg/dL Final   06/11/2021 20 7 - 30 mg/dL Final     Creatinine   Date Value Ref Range Status   04/14/2025 0.99 0.67 - 1.17 mg/dL Final   06/11/2021 0.90 0.66 - 1.25 mg/dL Final     GFR Estimate   Date Value Ref Range Status   04/14/2025 89 >60 mL/min/1.73m2 Final     Comment:     eGFR calculated using 2021 CKD-EPI equation.   06/11/2021 >90 >60 mL/min/[1.73_m2] Final     Comment:     Non  GFR Calc  Starting 12/18/2018, serum creatinine based estimated GFR (eGFR) will be   calculated using the Chronic Kidney Disease Epidemiology Collaboration   (CKD-EPI) equation.       Calcium   Date Value Ref Range Status   04/14/2025 9.2 8.8 - 10.4 mg/dL Final   06/11/2021 9.3 8.5 - 10.1 mg/dL Final     ALT   Date Value Ref Range Status   04/14/2025 31 0 - 70 U/L Final   06/11/2021 35 0 - 70 U/L Final     AST   Date Value Ref Range Status   04/14/2025  "31 0 - 45 U/L Final   06/11/2021 19 0 - 45 U/L Final     Cholesterol   Date Value Ref Range Status   04/14/2025 206 (H) <200 mg/dL Final   06/11/2021 186 <200 mg/dL Final     HDL Cholesterol   Date Value Ref Range Status   06/11/2021 68 >39 mg/dL Final     Direct Measure HDL   Date Value Ref Range Status   04/14/2025 61 >=40 mg/dL Final     LDL Cholesterol Calculated   Date Value Ref Range Status   04/14/2025 116 (H) <100 mg/dL Final   06/11/2021 104 (H) <100 mg/dL Final     Comment:     Above desirable:  100-129 mg/dl  Borderline High:  130-159 mg/dL  High:             160-189 mg/dL  Very high:       >189 mg/dl       Triglycerides   Date Value Ref Range Status   04/14/2025 144 <150 mg/dL Final   06/11/2021 71 <150 mg/dL Final     Comment:     Fasting specimen     Hemoglobin   Date Value Ref Range Status   05/17/2024 15.4 13.3 - 17.7 g/dL Final   04/02/2020 13.8 13.3 - 17.7 g/dL Final           BP Readings from Last 6 Encounters:   06/09/25 131/83   04/30/25 126/76   04/14/25 126/80   05/02/24 (!) 144/88   04/10/24 102/60   11/17/22 132/77       Pulse Readings from Last 6 Encounters:   06/09/25 54   04/30/25 57   04/14/25 51   05/02/24 59   04/10/24 53   11/17/22 50           PHYSICAL EXAM:  /83   Pulse 54   Ht 6' 4\" (1.93 m)   Wt 260 lb 12.8 oz (118.3 kg)   SpO2 95%   BMI 31.75 kg/m    GENERAL: Healthy, alert and no distress  CV: Regular rate and rhythm without murmurs, rubs, or gallops.  RESP: Lungs are clear to auscultation bilaterally with good breath sounds. No audible wheeze, cough, or visible cyanosis.  No visible retractions or increased work of breathing.    SKIN: Visible skin clear. No significant rash, abnormal pigmentation or lesions.  PSYCH: Mentation appears normal, affect normal/bright, judgement and insight intact, normal speech and appearance well-groomed.    COUNSELING:   Reviewed obesity as a chronic disease and comprehensive management stratagies.      We discussed Bariatric Basics " including:  -eating 3 meals daily  -eating protein first  -eating slowly, chewing food well  -avoiding/limiting calorie containing beverages  -limiting carbohydrates and changing to whole grains  -limiting restaurant or cafeteria eating to twice a week or less    We discussed the importance of restorative sleep and stress management in maintaining a healthy weight.  We discussed insulin resistance and glycemic index as it relates to appetite and weight control.   We discussed the importance of physical activity including cardiovascular and strength training in maintaining a healthier weight and explored viable options.  Patient education of above written in AVS.      Sincerely,    Gemma Kirkland PA-C

## 2025-06-09 NOTE — ASSESSMENT & PLAN NOTE
6/9/2025 MWL Initial . Patient doing well with activity nutrition sleep and stress. Phentermine start: Pt will check BP/P 2 wks after starting and send result through [facility].  RD referral, Labs     Goals:  Have something for protein for breakfast

## 2025-07-03 ENCOUNTER — ALLIED HEALTH/NURSE VISIT (OUTPATIENT)
Dept: PEDIATRICS | Facility: CLINIC | Age: 55
End: 2025-07-03
Payer: COMMERCIAL

## 2025-07-03 ENCOUNTER — LAB (OUTPATIENT)
Dept: LAB | Facility: CLINIC | Age: 55
End: 2025-07-03
Payer: COMMERCIAL

## 2025-07-03 VITALS — HEART RATE: 62 BPM | SYSTOLIC BLOOD PRESSURE: 135 MMHG | OXYGEN SATURATION: 98 % | DIASTOLIC BLOOD PRESSURE: 88 MMHG

## 2025-07-03 DIAGNOSIS — Z01.30 BLOOD PRESSURE CHECK: Primary | ICD-10-CM

## 2025-07-03 DIAGNOSIS — R76.8 ANTI-TPO ANTIBODIES PRESENT: ICD-10-CM

## 2025-07-03 DIAGNOSIS — E66.09 CLASS 1 OBESITY DUE TO EXCESS CALORIES WITHOUT SERIOUS COMORBIDITY WITH BODY MASS INDEX (BMI) OF 31.0 TO 31.9 IN ADULT: ICD-10-CM

## 2025-07-03 DIAGNOSIS — E66.811 CLASS 1 OBESITY DUE TO EXCESS CALORIES WITHOUT SERIOUS COMORBIDITY WITH BODY MASS INDEX (BMI) OF 31.0 TO 31.9 IN ADULT: ICD-10-CM

## 2025-07-03 DIAGNOSIS — E03.8 SUBCLINICAL HYPOTHYROIDISM: ICD-10-CM

## 2025-07-03 LAB
EST. AVERAGE GLUCOSE BLD GHB EST-MCNC: 108 MG/DL
HBA1C MFR BLD: 5.4 % (ref 0–5.6)
T4 FREE SERPL-MCNC: 1.21 NG/DL (ref 0.9–1.7)
TSH SERPL DL<=0.005 MIU/L-ACNC: 7.11 UIU/ML (ref 0.3–4.2)

## 2025-07-03 NOTE — PROGRESS NOTES
I met with Riley Lagos at the request of Dr. Ribeiro to recheck his blood pressure.  Blood pressure medications on the med list were reviewed with patient.  Patient started phentermine.  Patient has taken all medications as per usual regimen: Yes  Patient reports tolerating them without any issues or concerns: No    Vitals:    07/03/25 0800   BP: (!) 136/90   BP Location: Right arm   Cuff Size: Adult Large   Pulse: 62   SpO2: 98%       After 5 minutes, the patient's blood pressure was <140/90, the previous encounter was reviewed, recorded blood pressure below 140/90. Patient was discharged and the note will be sent to the provider for final review.    Serena Mcgill, CMA

## 2025-07-04 ENCOUNTER — RESULTS FOLLOW-UP (OUTPATIENT)
Dept: SURGERY | Facility: CLINIC | Age: 55
End: 2025-07-04

## 2025-07-07 ENCOUNTER — RESULTS FOLLOW-UP (OUTPATIENT)
Dept: ENDOCRINOLOGY | Facility: CLINIC | Age: 55
End: 2025-07-07
Payer: COMMERCIAL

## 2025-07-07 DIAGNOSIS — E03.8 SUBCLINICAL HYPOTHYROIDISM: Primary | ICD-10-CM

## 2025-07-07 RX ORDER — LEVOTHYROXINE SODIUM 75 UG/1
75 TABLET ORAL
Qty: 90 TABLET | Refills: 0 | Status: SHIPPED | OUTPATIENT
Start: 2025-07-07

## 2025-07-07 NOTE — TELEPHONE ENCOUNTER
Latest Ref Rng 7/3/2025  7:50 AM   ENDO THYROID LABS-[facility]     TSH 0.30 - 4.20 uIU/mL 7.11 (H)    FREE T4 0.90 - 1.70 ng/dL 1.21      On levothyroxine 50 mcg/day (X dose increase 4/2025)  Based on 7/3/2025 labs-- increase levothyroxine to 75 mcg/day  Labs in 2-3 months.    Please send new Rx and place same labs.  Please make a lab appointment for blood work and follow up clinic appointment in 1 week after that to discuss results.

## (undated) DEVICE — BASKET RETRIEVAL 1.9FRX120CM ESCAPE NTNL 4 WIRE 390-201

## (undated) DEVICE — CATH URETERAL FLEX TIP TIGERTAIL 06FRX70CM 139006

## (undated) DEVICE — LASER FIBER HOLMIUM 365UM HTB-365

## (undated) DEVICE — LINEN FULL SHEET 5511

## (undated) DEVICE — SOL WATER IRRIG 1000ML BOTTLE 2F7114

## (undated) DEVICE — PREP TECHNI-CARE CHLOROXYLENOL 3% 4OZ BOTTLE C222-4ZWO

## (undated) DEVICE — RAD RX ISOVUE 300 (50ML) 61% IOPAMIDOL CHARGE PER ML

## (undated) DEVICE — GLOVE PROTEXIS POWDER FREE SMT 7.5  2D72PT75X

## (undated) DEVICE — GUIDEWIRE URO STR STIFF .035"X150CM NITINOL 150NSS35

## (undated) DEVICE — BAG CLEAR TRASH 1.3M 39X33" P4040C

## (undated) DEVICE — SOL NACL 0.9% IRRIG 3000ML BAG 2B7477

## (undated) DEVICE — TUBING IRRIG TUR Y TYPE 96" LF 6543-01

## (undated) DEVICE — LINEN HALF SHEET 5512

## (undated) DEVICE — COVER FOOTSWITCH W/CINCH 20X24" 923267

## (undated) DEVICE — PACK CYSTO CUSTOM RIDGES

## (undated) RX ORDER — GLYCOPYRROLATE 0.2 MG/ML
INJECTION INTRAMUSCULAR; INTRAVENOUS
Status: DISPENSED
Start: 2020-06-19

## (undated) RX ORDER — LIDOCAINE HYDROCHLORIDE 10 MG/ML
INJECTION, SOLUTION EPIDURAL; INFILTRATION; INTRACAUDAL; PERINEURAL
Status: DISPENSED
Start: 2020-06-19

## (undated) RX ORDER — PROPOFOL 10 MG/ML
INJECTION, EMULSION INTRAVENOUS
Status: DISPENSED
Start: 2020-06-19

## (undated) RX ORDER — FENTANYL CITRATE 50 UG/ML
INJECTION, SOLUTION INTRAMUSCULAR; INTRAVENOUS
Status: DISPENSED
Start: 2020-06-19

## (undated) RX ORDER — ATROPA BELLADONNA AND OPIUM 16.2; 3 MG/1; MG/1
SUPPOSITORY RECTAL
Status: DISPENSED
Start: 2020-06-19

## (undated) RX ORDER — DEXAMETHASONE SODIUM PHOSPHATE 4 MG/ML
INJECTION, SOLUTION INTRA-ARTICULAR; INTRALESIONAL; INTRAMUSCULAR; INTRAVENOUS; SOFT TISSUE
Status: DISPENSED
Start: 2020-06-19

## (undated) RX ORDER — CEFAZOLIN SODIUM 2 G/100ML
INJECTION, SOLUTION INTRAVENOUS
Status: DISPENSED
Start: 2020-06-19

## (undated) RX ORDER — KETOROLAC TROMETHAMINE 30 MG/ML
INJECTION, SOLUTION INTRAMUSCULAR; INTRAVENOUS
Status: DISPENSED
Start: 2020-06-19

## (undated) RX ORDER — ONDANSETRON 2 MG/ML
INJECTION INTRAMUSCULAR; INTRAVENOUS
Status: DISPENSED
Start: 2020-06-19

## (undated) RX ORDER — PHENYLEPHRINE HCL IN 0.9% NACL 1 MG/10 ML
SYRINGE (ML) INTRAVENOUS
Status: DISPENSED
Start: 2020-06-19